# Patient Record
Sex: FEMALE | Race: WHITE | Employment: FULL TIME | URBAN - METROPOLITAN AREA
[De-identification: names, ages, dates, MRNs, and addresses within clinical notes are randomized per-mention and may not be internally consistent; named-entity substitution may affect disease eponyms.]

---

## 2017-01-17 ENCOUNTER — PATIENT MESSAGE (OUTPATIENT)
Dept: OTOLARYNGOLOGY | Facility: CLINIC | Age: 42
End: 2017-01-17

## 2017-01-17 RX ORDER — AZITHROMYCIN 250 MG/1
TABLET, FILM COATED ORAL
Qty: 1 PACKAGE | Refills: 0 | Status: SHIPPED | OUTPATIENT
Start: 2017-01-17 | End: 2017-02-22 | Stop reason: ALTCHOICE

## 2017-01-18 ENCOUNTER — TELEPHONE (OUTPATIENT)
Dept: ADMINISTRATIVE | Age: 42
End: 2017-01-18

## 2017-01-18 NOTE — TELEPHONE ENCOUNTER
Good morning,  [facility] certification recvd in [facility]. May I cover her intermittently for recurrent sinusitis? 1 to 3 days per month?   Please advise, thanks Tracie

## 2017-01-18 NOTE — TELEPHONE ENCOUNTER
From: Rosetta Segal  To: Riya Jacques MD  Sent: 1/17/2017 2:32 PM CST  Subject: Non-Urgent Medical Question    Hi Dr. Kinjal Sanches:    I think I have another sinus infection.  Are you able to prescribe me something or do I need to schedule an appointment t

## 2017-01-21 ENCOUNTER — OFFICE VISIT (OUTPATIENT)
Dept: OBGYN CLINIC | Facility: CLINIC | Age: 42
End: 2017-01-21

## 2017-01-21 VITALS
HEART RATE: 79 BPM | BODY MASS INDEX: 26.14 KG/M2 | DIASTOLIC BLOOD PRESSURE: 83 MMHG | SYSTOLIC BLOOD PRESSURE: 124 MMHG | HEIGHT: 64.5 IN | WEIGHT: 155 LBS

## 2017-01-21 DIAGNOSIS — Z01.419 ENCOUNTER FOR GYNECOLOGICAL EXAMINATION: Primary | ICD-10-CM

## 2017-01-21 PROCEDURE — 99396 PREV VISIT EST AGE 40-64: CPT | Performed by: OBSTETRICS & GYNECOLOGY

## 2017-01-21 RX ORDER — FLUCONAZOLE 150 MG/1
150 TABLET ORAL ONCE
Qty: 1 TABLET | Refills: 0 | Status: SHIPPED | OUTPATIENT
Start: 2017-01-21 | End: 2017-01-21

## 2017-01-21 RX ORDER — ACETAMINOPHEN AND CODEINE PHOSPHATE 120; 12 MG/5ML; MG/5ML
0.35 SOLUTION ORAL DAILY
Qty: 3 PACKAGE | Refills: 3 | Status: SHIPPED | OUTPATIENT
Start: 2017-01-21 | End: 2017-12-21

## 2017-01-21 NOTE — PROGRESS NOTES
Elizabeth Whatley is a 35year old female [facility] Patient's last menstrual period was 11/08/2016 (within weeks). here for annual exam.       Last seen 12/10/15. Last pap 4/2015 normal with neg HPV. Doing well with generic Seasonale.    Was diagnosed wi fluconazole (DIFLUCAN) 150 MG Oral Tab Take 1 tablet (150 mg total) by mouth once. Disp: 1 tablet Rfl: 0   azithromycin (ZITHROMAX Z-CORTNEY) 250 MG Oral Tab Take 1 by oral route every day for 5 days. 2 tablets today.  Disp: 1 Package Rfl: 0   MELOXICAM 7.5 M denies depression or anxiety. Endocrine:   denies excessive thirst or urination. Heme/Lymph:  easy bruising or bleeding.     PHYSICAL EXAM:   /83 mmHg  Pulse 79  Ht 5' 4.5\" (1.638 m)  Wt 155 lb (70.308 kg)  BMI 26.20 kg/m2  LMP 11/08/2016 (Within W Sig: Take 1 tablet (150 mg total) by mouth once.              Prudence Brown MD  1/21/2017  9:11 AM

## 2017-01-24 RX ORDER — MELOXICAM 7.5 MG/1
TABLET ORAL
Qty: 30 TABLET | Refills: 3 | Status: SHIPPED | OUTPATIENT
Start: 2017-01-24 | End: 2020-09-09

## 2017-02-22 ENCOUNTER — OFFICE VISIT (OUTPATIENT)
Dept: INTERNAL MEDICINE CLINIC | Facility: CLINIC | Age: 42
End: 2017-02-22

## 2017-02-22 VITALS
TEMPERATURE: 99 F | BODY MASS INDEX: 27.64 KG/M2 | SYSTOLIC BLOOD PRESSURE: 114 MMHG | HEART RATE: 74 BPM | DIASTOLIC BLOOD PRESSURE: 75 MMHG | RESPIRATION RATE: 18 BRPM | HEIGHT: 63 IN | WEIGHT: 156 LBS

## 2017-02-22 DIAGNOSIS — I10 ESSENTIAL HYPERTENSION: Primary | ICD-10-CM

## 2017-02-22 DIAGNOSIS — E78.5 HYPERLIPIDEMIA, UNSPECIFIED HYPERLIPIDEMIA TYPE: ICD-10-CM

## 2017-02-22 PROCEDURE — 99212 OFFICE O/P EST SF 10 MIN: CPT | Performed by: INTERNAL MEDICINE

## 2017-02-22 PROCEDURE — 99214 OFFICE O/P EST MOD 30 MIN: CPT | Performed by: INTERNAL MEDICINE

## 2017-02-22 RX ORDER — LOSARTAN POTASSIUM 50 MG/1
50 TABLET ORAL DAILY
Qty: 90 TABLET | Refills: 3 | Status: SHIPPED | OUTPATIENT
Start: 2017-02-22 | End: 2018-04-09

## 2017-02-22 NOTE — PROGRESS NOTES
HPI:    Patient ID: You Alvarado is a 35year old female.     HPI    HTN  Long standing history of hypertension  More than a year ariana  Status::::: controlled:::::::::::::::::::::::::::::y   sympotms  :        Headache no  dizziness        no fever, chills, activity change and fatigue. HENT: Negative for ear discharge, nosebleeds, postnasal drip, rhinorrhea, sinus pressure and sore throat. Eyes: Negative for pain, discharge and redness.    Respiratory: Negative for cough, chest tightness, s rub in gently and completely Disp:  Rfl:      Allergies:No Known Allergies    HISTORY:  Past Medical History   Diagnosis Date   • Psoriasis    • Conjunctival cyst 8/1/13     At 8 o'clock, OD   • Corneal ulcer- right eye 2/17/2016   • Hx of tonsillectomy 06 She is not diaphoretic. No erythema. Nursing note and vitals reviewed.            ASSESSMENT/PLAN:   Essential hypertension  (primary encounter diagnosis)  Hyperlipidemia, unspecified hyperlipidemia type  (I10) Essential hypertension  (primary encounter d

## 2017-03-13 ENCOUNTER — PATIENT MESSAGE (OUTPATIENT)
Dept: OBGYN CLINIC | Facility: CLINIC | Age: 42
End: 2017-03-13

## 2017-03-13 NOTE — TELEPHONE ENCOUNTER
From: Janet Delatorre  To: Puneet Leos MD  Sent: 3/13/2017 9:57 AM CDT  Subject: Prescription Question    Hi Dr. Jose Alberto Hodge:    Is it possible to go back to the other birth control pill I was taking?  With the mini pill I am getting my period every two weeks an

## 2017-03-18 ENCOUNTER — APPOINTMENT (OUTPATIENT)
Dept: LAB | Age: 42
End: 2017-03-18
Attending: INTERNAL MEDICINE
Payer: COMMERCIAL

## 2017-03-18 DIAGNOSIS — E78.5 HYPERLIPIDEMIA, UNSPECIFIED HYPERLIPIDEMIA TYPE: ICD-10-CM

## 2017-03-18 DIAGNOSIS — M25.50 POLYARTHRALGIA: ICD-10-CM

## 2017-03-18 DIAGNOSIS — I10 ESSENTIAL HYPERTENSION: ICD-10-CM

## 2017-03-18 DIAGNOSIS — R76.8 POSITIVE ANA (ANTINUCLEAR ANTIBODY): ICD-10-CM

## 2017-03-18 LAB
ALT SERPL-CCNC: 13 U/L (ref 14–54)
ANION GAP SERPL CALC-SCNC: 5 MMOL/L (ref 0–18)
AST SERPL-CCNC: 17 U/L (ref 15–41)
BUN SERPL-MCNC: 12 MG/DL (ref 8–20)
BUN/CREAT SERPL: 14.1 (ref 10–20)
CALCIUM SERPL-MCNC: 9.1 MG/DL (ref 8.5–10.5)
CHLORIDE SERPL-SCNC: 109 MMOL/L (ref 95–110)
CHOLEST SERPL-MCNC: 174 MG/DL (ref 110–200)
CO2 SERPL-SCNC: 25 MMOL/L (ref 22–32)
CREAT SERPL-MCNC: 0.85 MG/DL (ref 0.5–1.5)
CRP SERPL-MCNC: <0.5 MG/DL (ref 0–0.9)
ERYTHROCYTE [SEDIMENTATION RATE] IN BLOOD: 9 MM/HR (ref 0–20)
GLUCOSE SERPL-MCNC: 98 MG/DL (ref 70–99)
HDLC SERPL-MCNC: 52 MG/DL
LDLC SERPL CALC-MCNC: 114 MG/DL (ref 0–99)
NONHDLC SERPL-MCNC: 122 MG/DL
OSMOLALITY UR CALC.SUM OF ELEC: 288 MOSM/KG (ref 275–295)
POTASSIUM SERPL-SCNC: 4.7 MMOL/L (ref 3.3–5.1)
SODIUM SERPL-SCNC: 139 MMOL/L (ref 136–144)
TRIGL SERPL-MCNC: 40 MG/DL (ref 1–149)

## 2017-03-18 PROCEDURE — 85652 RBC SED RATE AUTOMATED: CPT

## 2017-03-18 PROCEDURE — 86235 NUCLEAR ANTIGEN ANTIBODY: CPT

## 2017-03-18 PROCEDURE — 84450 TRANSFERASE (AST) (SGOT): CPT

## 2017-03-18 PROCEDURE — 80061 LIPID PANEL: CPT

## 2017-03-18 PROCEDURE — 84460 ALANINE AMINO (ALT) (SGPT): CPT

## 2017-03-18 PROCEDURE — 80048 BASIC METABOLIC PNL TOTAL CA: CPT

## 2017-03-18 PROCEDURE — 86140 C-REACTIVE PROTEIN: CPT

## 2017-03-18 PROCEDURE — 36415 COLL VENOUS BLD VENIPUNCTURE: CPT

## 2017-03-22 LAB
ENA SS-A AB SER QL IA: NEGATIVE
ENA SS-B AB SER QL IA: NEGATIVE

## 2017-06-13 ENCOUNTER — OFFICE VISIT (OUTPATIENT)
Dept: OTOLARYNGOLOGY | Facility: CLINIC | Age: 42
End: 2017-06-13

## 2017-06-13 VITALS
WEIGHT: 150 LBS | BODY MASS INDEX: 26.58 KG/M2 | SYSTOLIC BLOOD PRESSURE: 112 MMHG | DIASTOLIC BLOOD PRESSURE: 80 MMHG | TEMPERATURE: 98 F | HEIGHT: 63 IN

## 2017-06-13 DIAGNOSIS — G47.33 OBSTRUCTIVE SLEEP APNEA SYNDROME: ICD-10-CM

## 2017-06-13 DIAGNOSIS — J30.9 ALLERGIC RHINITIS, UNSPECIFIED ALLERGIC RHINITIS TRIGGER, UNSPECIFIED RHINITIS SEASONALITY: Primary | ICD-10-CM

## 2017-06-13 PROCEDURE — 99212 OFFICE O/P EST SF 10 MIN: CPT | Performed by: OTOLARYNGOLOGY

## 2017-06-13 PROCEDURE — 99213 OFFICE O/P EST LOW 20 MIN: CPT | Performed by: OTOLARYNGOLOGY

## 2017-06-13 RX ORDER — LEVOCETIRIZINE DIHYDROCHLORIDE 5 MG/1
5 TABLET, FILM COATED ORAL EVERY EVENING
Qty: 30 TABLET | Refills: 5 | Status: SHIPPED | OUTPATIENT
Start: 2017-06-13 | End: 2018-02-01

## 2017-06-13 RX ORDER — TRIAMCINOLONE ACETONIDE 55 UG/1
2 SPRAY, METERED NASAL DAILY
Qty: 1 INHALER | Refills: 5 | Status: SHIPPED | OUTPATIENT
Start: 2017-06-13 | End: 2018-04-06

## 2017-06-13 RX ORDER — NORETHINDRONE 0.35 MG/1
TABLET ORAL
Refills: 3 | COMMUNITY
Start: 2017-04-13 | End: 2018-02-01

## 2017-06-14 ENCOUNTER — TELEPHONE (OUTPATIENT)
Dept: OTOLARYNGOLOGY | Facility: CLINIC | Age: 42
End: 2017-06-14

## 2017-06-14 NOTE — TELEPHONE ENCOUNTER
Called pt's [phone], spoke with Morris Dillon, a prior authorization is not required for sleep study, reference number [id number]. Spoke with pt and informed that she can schedule sleep study at this time.  It is the patient's responsibility to verify bene

## 2017-06-14 NOTE — PROGRESS NOTES
Melania Mcelroy is a 35year old female. Patient presents with:  Throat Problem: post nasal drip for 2 weeks, c/o dry throat    HPI:   PND and light cough with throat irritation. Snoring a lot and sleeps very poorly.  Tired all the time    Current Outpa 0.0 oz/week       0 Standard drinks or equivalent per week       Comment: rarely       REVIEW OF SYSTEMS:   GENERAL HEALTH: feels well otherwise  GENERAL : denies fever, chills, sweats, weight loss, weight gain  SKIN: denies any unusual skin lesions o

## 2017-07-17 ENCOUNTER — OFFICE VISIT (OUTPATIENT)
Dept: DERMATOLOGY CLINIC | Facility: CLINIC | Age: 42
End: 2017-07-17

## 2017-07-17 ENCOUNTER — APPOINTMENT (OUTPATIENT)
Dept: LAB | Facility: HOSPITAL | Age: 42
End: 2017-07-17
Attending: DERMATOLOGY
Payer: COMMERCIAL

## 2017-07-17 DIAGNOSIS — L40.9 PSORIASIS: ICD-10-CM

## 2017-07-17 DIAGNOSIS — D23.9 BENIGN NEOPLASM OF SKIN, UNSPECIFIED LOCATION: ICD-10-CM

## 2017-07-17 DIAGNOSIS — L40.8 OTHER PSORIASIS: ICD-10-CM

## 2017-07-17 DIAGNOSIS — D48.5 NEOPLASM OF UNCERTAIN BEHAVIOR OF SKIN: Primary | ICD-10-CM

## 2017-07-17 DIAGNOSIS — D22.9 MULTIPLE NEVI: ICD-10-CM

## 2017-07-17 PROCEDURE — 99213 OFFICE O/P EST LOW 20 MIN: CPT | Performed by: DERMATOLOGY

## 2017-07-17 PROCEDURE — 11100 BIOPSY OF SKIN LESION: CPT | Performed by: DERMATOLOGY

## 2017-07-17 PROCEDURE — 99212 OFFICE O/P EST SF 10 MIN: CPT | Performed by: DERMATOLOGY

## 2017-07-17 PROCEDURE — 88305 TISSUE EXAM BY PATHOLOGIST: CPT

## 2017-07-20 NOTE — PROGRESS NOTES
The pathology report from last visit showed   right upper abdomen  Compound nevus . Please log in test results, send biopsy results letter. Pt to rtc 1 year or prn.

## 2017-07-21 NOTE — PROGRESS NOTES
Bx letter sent via [address] [place] [address] [address]. Result logged & checked off in path book.

## 2017-07-31 ENCOUNTER — TELEPHONE (OUTPATIENT)
Dept: DERMATOLOGY CLINIC | Facility: CLINIC | Age: 42
End: 2017-07-31

## 2017-07-31 NOTE — PROGRESS NOTES
Burgess Henley is a 35year old female. HPI:     CC:  Patient presents with:  Psoriasis: LOV: 10/24/16 Pt presents for f/u on psoriasis. States feels it is getting worse. Allergies:  Review of patient's allergies indicates no known allergies. every day a thin film to the affected skin areas Disp: 80 g Rfl: 3   Fluticasone Propionate 0.05 % External Cream Apply 1 Application topically 2 (two) times daily.  Use as directed 2 times daily for psoriasis on face Disp: 30 g Rfl: 2   Azelastine HCl (AST visit.    HPI:    Patient presents with:  Psoriasis: LOV: 10/24/16 Pt presents for f/u on psoriasis. States feels it is getting worse. Patient presents with concerns above. Patient has been in their usual state of health.   History, medications, all further plans pending pathology      Benign-appearing nevus on back observe. Psoriasis. Plaque-type.  >10% body surface involvement. Including scalp. Elbows knees more persistent scattered lesions on trunk and extremities. Has been using topicals.

## 2017-08-01 NOTE — TELEPHONE ENCOUNTER
Gurpreet Parks form in [address] box--please fax -please attach additional information if needed

## 2017-08-02 NOTE — TELEPHONE ENCOUNTER
Pt is not eligible for [place] [employer] free offer program due to cannot verify insurance coverage.  PLaced fax in rn inbox

## 2017-08-04 NOTE — TELEPHONE ENCOUNTER
Otezla not covered.  Please call [phone] ref # [id number] mon-Friday 9am-530pm EST -- will fax over form

## 2017-08-07 ENCOUNTER — TELEPHONE (OUTPATIENT)
Dept: DERMATOLOGY CLINIC | Facility: CLINIC | Age: 42
End: 2017-08-07

## 2017-08-10 ENCOUNTER — TELEPHONE (OUTPATIENT)
Dept: DERMATOLOGY CLINIC | Facility: CLINIC | Age: 42
End: 2017-08-10

## 2017-08-10 NOTE — TELEPHONE ENCOUNTER
pls call [phone] [facility] has some questions regarding the otezla pls advise reference # [id number]

## 2017-08-10 NOTE — TELEPHONE ENCOUNTER
[facility] pharmacist called, states since PA has not gone through for HÜTTEN, asked if HÜTTEN Rx should be cancelled. Pharmacist informed to cancel Rx for HÜTTEN, has been sent to a mail order pharmacy.   Pharmacist notes that HÜTTEN Rx is cancelled at [facility]

## 2017-08-14 NOTE — TELEPHONE ENCOUNTER
Spoke with [facility] pharmacy this a.m. PA for HÜTTEN completed over the telephone. PA for HÜTTEN approved though 12/2017. [facility] will send pt starter pack and Rx as prescribed by [employer].

## 2017-08-15 ENCOUNTER — TELEPHONE (OUTPATIENT)
Dept: DERMATOLOGY CLINIC | Facility: CLINIC | Age: 42
End: 2017-08-15

## 2017-08-16 NOTE — TELEPHONE ENCOUNTER
[facility] contacted regarding step therapy informed to please disregard. [facility] states that PA for Otezla starter pack and maintenance dose was approved for $15 - Fax conformation received, approved from 7/15/2017 through 12/12/2017.  [employer]

## 2017-08-31 ENCOUNTER — TELEPHONE (OUTPATIENT)
Dept: DERMATOLOGY CLINIC | Facility: CLINIC | Age: 42
End: 2017-08-31

## 2017-09-01 NOTE — TELEPHONE ENCOUNTER
[facility] [employer] faxed Adverse reaction form. Pt was sent the  Otezla maintenance dose before the starter pack.  Pt states that she contacted [employer] and reported that she was having tingling sensation to both forearms and slight di

## 2017-09-01 NOTE — TELEPHONE ENCOUNTER
Information received from [address] [place] [address] regarding adverse reaction to [employer]. Patient c/o diarrhea, nausea, and  Joint pain.  Paperwork in [employer] box partially filled out

## 2017-09-01 NOTE — TELEPHONE ENCOUNTER
Noted.  Glad symptoms resolved. Will let our rep know about the confusion with the starter and maintenance.    Await response

## 2017-09-12 NOTE — TELEPHONE ENCOUNTER
Ange has received information from [employer] regarding adverse events.  Placed fax in rn inbox

## 2017-12-07 ENCOUNTER — TELEPHONE (OUTPATIENT)
Dept: OTOLARYNGOLOGY | Facility: CLINIC | Age: 42
End: 2017-12-07

## 2017-12-07 NOTE — TELEPHONE ENCOUNTER
Called pt's insurance [phone], spoke with Sandra Salazar, a prior authorization is not needed for sleep study, reference number [id number]. Left message for pt to call back to inform that she can schedule sleep study at this time.  It is the patient's responsibilit

## 2017-12-22 ENCOUNTER — PATIENT MESSAGE (OUTPATIENT)
Dept: OBGYN CLINIC | Facility: CLINIC | Age: 42
End: 2017-12-22

## 2017-12-22 DIAGNOSIS — Z12.39 SCREENING FOR MALIGNANT NEOPLASM OF BREAST: Primary | ICD-10-CM

## 2017-12-22 RX ORDER — NORETHINDRONE 0.35 MG/1
TABLET ORAL
Qty: 84 TABLET | Refills: 0 | Status: SHIPPED | OUTPATIENT
Start: 2017-12-22 | End: 2018-02-01

## 2017-12-22 NOTE — TELEPHONE ENCOUNTER
Pt looking for a mammogram order before her appt on 2/1/18  with RADHA. Last mammogram 12/17/16 and normal.  Sent to [facility] Ramon [facility] for recs.

## 2017-12-22 NOTE — TELEPHONE ENCOUNTER
Rx sent fr Elda Bloom thru [facility]. Sent for 84 tablets. Pt next appt with RADHA is 2/1/18. Last Annual with RADHA 1-21-17.

## 2018-01-13 ENCOUNTER — HOSPITAL ENCOUNTER (OUTPATIENT)
Dept: MAMMOGRAPHY | Age: 43
Discharge: HOME | End: 2018-01-13
Attending: OBSTETRICS & GYNECOLOGY
Payer: COMMERCIAL

## 2018-01-13 DIAGNOSIS — Z12.39 SCREENING FOR MALIGNANT NEOPLASM OF BREAST: ICD-10-CM

## 2018-01-13 PROCEDURE — 77067 SCR MAMMO BI INCL CAD: CPT | Performed by: OBSTETRICS & GYNECOLOGY

## 2018-02-01 ENCOUNTER — OFFICE VISIT (OUTPATIENT)
Dept: OBGYN CLINIC | Facility: CLINIC | Age: 43
End: 2018-02-01

## 2018-02-01 VITALS
HEART RATE: 64 BPM | BODY MASS INDEX: 26.58 KG/M2 | WEIGHT: 150 LBS | DIASTOLIC BLOOD PRESSURE: 73 MMHG | HEIGHT: 63 IN | SYSTOLIC BLOOD PRESSURE: 111 MMHG

## 2018-02-01 DIAGNOSIS — Z01.419 ENCOUNTER FOR GYNECOLOGICAL EXAMINATION: Primary | ICD-10-CM

## 2018-02-01 DIAGNOSIS — Z12.4 SCREENING FOR MALIGNANT NEOPLASM OF CERVIX: ICD-10-CM

## 2018-02-01 PROCEDURE — 99396 PREV VISIT EST AGE 40-64: CPT | Performed by: OBSTETRICS & GYNECOLOGY

## 2018-02-01 RX ORDER — APREMILAST 30 MG/1
TABLET, FILM COATED ORAL
COMMUNITY
Start: 2018-01-29 | End: 2018-07-23

## 2018-02-01 RX ORDER — NORETHINDRONE 0.35 MG/1
TABLET ORAL
Qty: 3 PACKAGE | Refills: 3 | Status: SHIPPED | OUTPATIENT
Start: 2018-02-01 | End: 2018-04-20

## 2018-02-01 NOTE — PROGRESS NOTES
Tamiko Wells is a 38year old female [facility] Patient's last menstrual period was 01/09/2018. here for annual exam.       Last seen 1/21/17. Last pap 4/2015 normal with neg HPV. Menses q month.   Since being on Nor QD, flow lasting 7 days and heavy route daily.  Disp: 1 Inhaler Rfl: 5   Clobetasol Propionate 0.05 % External Foam Use bid Disp: 100 g Rfl: 6   Fluocinolone Acetonide (DERMOTIC) 0.01 % Otic Oil Use qhs to ears for psoriasis Disp: 20 mL Rfl: 6   triamcinolone acetonide 0.1 % External Cream nipple discharge, nipple retraction or skin changes  Abdomen:  soft, nontender, nondistended, no masses  Psychiatric:  Oriented to time, place, person and situation.  Appropriate mood and affect    Pelvic Exam:  External Genitalia: normal appearance, hair d

## 2018-02-02 LAB — HPV I/H RISK 1 DNA SPEC QL NAA+PROBE: NEGATIVE

## 2018-02-06 ENCOUNTER — TELEPHONE (OUTPATIENT)
Dept: OBGYN CLINIC | Facility: CLINIC | Age: 43
End: 2018-02-06

## 2018-02-06 ENCOUNTER — NURSE ONLY (OUTPATIENT)
Dept: OBGYN CLINIC | Facility: CLINIC | Age: 43
End: 2018-02-06

## 2018-02-06 VITALS
HEART RATE: 66 BPM | BODY MASS INDEX: 27 KG/M2 | DIASTOLIC BLOOD PRESSURE: 84 MMHG | SYSTOLIC BLOOD PRESSURE: 130 MMHG | WEIGHT: 150 LBS

## 2018-02-06 DIAGNOSIS — Z30.42 ENCOUNTER FOR SURVEILLANCE OF INJECTABLE CONTRACEPTIVE: Primary | ICD-10-CM

## 2018-02-06 PROCEDURE — 96372 THER/PROPH/DIAG INJ SC/IM: CPT | Performed by: OBSTETRICS & GYNECOLOGY

## 2018-02-06 RX ORDER — MEDROXYPROGESTERONE ACETATE 150 MG/ML
150 INJECTION, SUSPENSION INTRAMUSCULAR
Status: COMPLETED | OUTPATIENT
Start: 2018-02-06 | End: 2018-07-18

## 2018-02-06 RX ADMIN — MEDROXYPROGESTERONE ACETATE 150 MG: 150 INJECTION, SUSPENSION INTRAMUSCULAR at 15:26:00

## 2018-02-06 NOTE — TELEPHONE ENCOUNTER
Message to Zhou Ramon [phone] to please advise message below--OK to schedule depo injection for this week since pt is currently on menses?

## 2018-02-06 NOTE — TELEPHONE ENCOUNTER
Pt states at her last appt she spoke with JOHANNE about switching BC to the depo. Pt also states yesterday was the first day of her menses and wondering what the next steps would be.  Please advise

## 2018-02-06 NOTE — PROGRESS NOTES
Patient here today for 1st Depo Provera injection per JLK. Patient's last Annual was February 1, 2018 Injection given on the Right, upper outer Gluteus side patient tolerated well.  Patient given written return instruction for April 24 - May 8 2018  for ne

## 2018-03-05 ENCOUNTER — OFFICE VISIT (OUTPATIENT)
Dept: SLEEP CENTER | Age: 43
End: 2018-03-05
Attending: OTOLARYNGOLOGY
Payer: COMMERCIAL

## 2018-03-05 DIAGNOSIS — Z76.89 SLEEP CONCERN: Primary | ICD-10-CM

## 2018-03-05 PROCEDURE — 95810 POLYSOM 6/> YRS 4/> PARAM: CPT

## 2018-03-07 NOTE — PROCEDURES
[address] [facility]  Accredited by the [employer] of [employer] (AASM)    PATIENT'S NAME: Lex Black   ATTENDING PHYSICIAN: Shubham Arora MD   REFERRING PHYSICIAN: Magalys Plasencia.  Bernabe Arora MD   PATIENT ACCOUNT #: [id number] LOCATION: Sle events per hour.   There were 24 periodic limb movements for a periodic limb movement index of 6.4 events per hour, of which 0.5 per hour were associated with arousal.  Lowest desaturation was to 94%, the average heart rate was 35 beats per minute, and ther

## 2018-03-08 ENCOUNTER — TELEPHONE (OUTPATIENT)
Dept: OTHER | Age: 43
End: 2018-03-08

## 2018-03-08 ENCOUNTER — TELEPHONE (OUTPATIENT)
Dept: OTOLARYNGOLOGY | Facility: CLINIC | Age: 43
End: 2018-03-08

## 2018-03-08 NOTE — TELEPHONE ENCOUNTER
----- Message from Uma Talavera RN sent at 3/8/2018  7:43 AM CST -----      ----- Message -----  From: Britta Nissen, MD  Sent: 3/7/2018  11:59 PM  To: Teresita Gomez Lpgautam/Dangelo    Send letter and copy of test result.   Sleep study is normal        Letter and Co

## 2018-03-12 NOTE — TELEPHONE ENCOUNTER
Notes Recorded by Christine Loza RN on 3/12/2018 at 3:36 PM CDT  Pt informed of results per .  Pt verbalized understanding.

## 2018-03-17 RX ORDER — ACETAMINOPHEN AND CODEINE PHOSPHATE 120; 12 MG/5ML; MG/5ML
SOLUTION ORAL
Qty: 84 TABLET | Refills: 0 | OUTPATIENT
Start: 2018-03-17

## 2018-04-10 RX ORDER — TRIAMCINOLONE ACETONIDE 55 UG/1
2 SPRAY, METERED NASAL DAILY
Qty: 1 INHALER | Refills: 5 | Status: SHIPPED
Start: 2018-04-10 | End: 2018-12-01

## 2018-04-13 RX ORDER — LOSARTAN POTASSIUM 50 MG/1
TABLET ORAL
Qty: 30 TABLET | Refills: 0 | Status: SHIPPED | OUTPATIENT
Start: 2018-04-13 | End: 2018-11-27

## 2018-04-15 ENCOUNTER — LAB ENCOUNTER (OUTPATIENT)
Dept: LAB | Facility: HOSPITAL | Age: 43
End: 2018-04-15
Attending: INTERNAL MEDICINE
Payer: COMMERCIAL

## 2018-04-15 DIAGNOSIS — I10 ESSENTIAL HYPERTENSION: ICD-10-CM

## 2018-04-15 DIAGNOSIS — Z00.00 ROUTINE GENERAL MEDICAL EXAMINATION AT A HEALTH CARE FACILITY: ICD-10-CM

## 2018-04-15 PROCEDURE — 87086 URINE CULTURE/COLONY COUNT: CPT

## 2018-04-15 PROCEDURE — 85027 COMPLETE CBC AUTOMATED: CPT

## 2018-04-15 PROCEDURE — 81015 MICROSCOPIC EXAM OF URINE: CPT

## 2018-04-15 PROCEDURE — 36415 COLL VENOUS BLD VENIPUNCTURE: CPT

## 2018-04-15 PROCEDURE — 87147 CULTURE TYPE IMMUNOLOGIC: CPT

## 2018-04-15 PROCEDURE — 80061 LIPID PANEL: CPT

## 2018-04-15 PROCEDURE — 84439 ASSAY OF FREE THYROXINE: CPT

## 2018-04-15 PROCEDURE — 84443 ASSAY THYROID STIM HORMONE: CPT

## 2018-04-15 PROCEDURE — 80053 COMPREHEN METABOLIC PANEL: CPT

## 2018-04-18 ENCOUNTER — TELEPHONE (OUTPATIENT)
Dept: INTERNAL MEDICINE CLINIC | Facility: CLINIC | Age: 43
End: 2018-04-18

## 2018-04-18 NOTE — TELEPHONE ENCOUNTER
Pt returned call, reviewed lab results with pt. Pt denies Elda Kumar, but having left lower abdominal pain since Jan. 2018. Pain is dull,intermittent and nonradiating with pain scale ranging 2 to 3. Stated \"I have high pain tolerance. \"  Pt mentioned pain to

## 2018-04-18 NOTE — TELEPHONE ENCOUNTER
How is she      Markedly abnormal uinalysis    Blood and WBC noted  Advise follow up with urology  Culture strep no need to treat    Any UTI symptoms?     Other albs are within normal  Component      Latest Ref Rng & Units 4/15/2018   Glucose      70 - 99 m

## 2018-04-20 ENCOUNTER — APPOINTMENT (OUTPATIENT)
Dept: LAB | Age: 43
End: 2018-04-20
Attending: INTERNAL MEDICINE
Payer: COMMERCIAL

## 2018-04-20 ENCOUNTER — OFFICE VISIT (OUTPATIENT)
Dept: INTERNAL MEDICINE CLINIC | Facility: CLINIC | Age: 43
End: 2018-04-20

## 2018-04-20 ENCOUNTER — HOSPITAL ENCOUNTER (OUTPATIENT)
Dept: ULTRASOUND IMAGING | Facility: HOSPITAL | Age: 43
Discharge: HOME | End: 2018-04-20
Attending: INTERNAL MEDICINE
Payer: COMMERCIAL

## 2018-04-20 VITALS
HEIGHT: 63 IN | WEIGHT: 148 LBS | DIASTOLIC BLOOD PRESSURE: 72 MMHG | TEMPERATURE: 99 F | HEART RATE: 87 BPM | BODY MASS INDEX: 26.22 KG/M2 | SYSTOLIC BLOOD PRESSURE: 104 MMHG

## 2018-04-20 DIAGNOSIS — R31.9 HEMATURIA, UNSPECIFIED TYPE: ICD-10-CM

## 2018-04-20 DIAGNOSIS — R10.32 LLQ PAIN: ICD-10-CM

## 2018-04-20 DIAGNOSIS — Z80.0 FAMILY HISTORY OF COLON CANCER REQUIRING SCREENING COLONOSCOPY: ICD-10-CM

## 2018-04-20 DIAGNOSIS — Z00.00 ROUTINE GENERAL MEDICAL EXAMINATION AT A HEALTH CARE FACILITY: Primary | ICD-10-CM

## 2018-04-20 PROCEDURE — 99212 OFFICE O/P EST SF 10 MIN: CPT | Performed by: INTERNAL MEDICINE

## 2018-04-20 PROCEDURE — 81001 URINALYSIS AUTO W/SCOPE: CPT

## 2018-04-20 PROCEDURE — 87147 CULTURE TYPE IMMUNOLOGIC: CPT

## 2018-04-20 PROCEDURE — 87086 URINE CULTURE/COLONY COUNT: CPT

## 2018-04-20 PROCEDURE — 99396 PREV VISIT EST AGE 40-64: CPT | Performed by: INTERNAL MEDICINE

## 2018-04-20 PROCEDURE — 76770 US EXAM ABDO BACK WALL COMP: CPT | Performed by: INTERNAL MEDICINE

## 2018-04-20 PROCEDURE — 99213 OFFICE O/P EST LOW 20 MIN: CPT | Performed by: INTERNAL MEDICINE

## 2018-04-20 RX ORDER — LOSARTAN POTASSIUM 50 MG/1
50 TABLET ORAL DAILY
Qty: 90 TABLET | Refills: 1 | Status: SHIPPED | OUTPATIENT
Start: 2018-04-20 | End: 2018-08-14

## 2018-04-20 NOTE — PROGRESS NOTES
HPI:    Patient ID: Zack Rojas is a 38year old female.     HPI    sched for physical   Next Wed  Would like phsyical exam and address acute problesm    Abnormal urinalysis with morscopic hematuria    Complaint of  Random pain once in a while left l light-headedness and headaches. Hematological: Negative for adenopathy. Does not bruise/bleed easily. Psychiatric/Behavioral: Negative for agitation and behavioral problems.            Current Outpatient Prescriptions:  Losartan Potassium 50 MG Oral Tab death   • Hypertension Mother    • Diabetes Maternal Grandmother    • Glaucoma Neg    • Macular degeneration Neg       Social History: Smoking status: Never Smoker                                                              Smokeless tobacco: Never Used 12 (L)   ALKALINE PHOSPHATASE      32 - 100 U/L 50   Total Bilirubin      0.3 - 1.2 mg/dL 0.6   TOTAL PROTEIN      5.9 - 8.4 g/dL 7.1   Albumin      3.5 - 4.8 g/dL 3.8   GLOBULIN, TOTAL      2.5 - 3.7 g/dL 3.3   A/G RATIO      1.0 - 2.0 1.2   ANION GAP HTN  /72 (BP Location: Right arm, Patient Position: Sitting, Cuff Size: adult)   Pulse 87   Temp 98.6 °F (37 °C) (Oral)   Ht 5' 3\" (1.6 m)   Wt 148 lb (67.1 kg)   LMP 04/06/2018 (Approximate)   BMI 26.22 kg/m²   Controlled   Refill med    [facility]

## 2018-04-21 NOTE — TELEPHONE ENCOUNTER
Office Visit  Open      4/20/2018  Valeri Chawla MD   Internal Medicine   Hematuria, unspecified type +3 more   Dx   Lab Results    Reason for Visit

## 2018-04-23 ENCOUNTER — TELEPHONE (OUTPATIENT)
Dept: OBGYN CLINIC | Facility: CLINIC | Age: 43
End: 2018-04-23

## 2018-04-23 NOTE — TELEPHONE ENCOUNTER
Last annual: 2/1/2018   Last depo injection: 2/6/18 with recs to return April 24 - May 8 2018  for next injection. Pt accepted appt for next injection on 4/28.

## 2018-04-28 ENCOUNTER — NURSE ONLY (OUTPATIENT)
Dept: OBGYN CLINIC | Facility: CLINIC | Age: 43
End: 2018-04-28

## 2018-04-28 VITALS
WEIGHT: 148 LBS | BODY MASS INDEX: 26 KG/M2 | HEART RATE: 76 BPM | SYSTOLIC BLOOD PRESSURE: 102 MMHG | DIASTOLIC BLOOD PRESSURE: 71 MMHG

## 2018-04-28 DIAGNOSIS — Z30.42 ENCOUNTER FOR SURVEILLANCE OF INJECTABLE CONTRACEPTIVE: Primary | ICD-10-CM

## 2018-04-28 PROCEDURE — 96372 THER/PROPH/DIAG INJ SC/IM: CPT | Performed by: OBSTETRICS & GYNECOLOGY

## 2018-04-28 RX ADMIN — MEDROXYPROGESTERONE ACETATE 150 MG: 150 INJECTION, SUSPENSION INTRAMUSCULAR at 08:46:00

## 2018-04-28 NOTE — PROGRESS NOTES
Patient here today for Depo Provera injection. Patient's last Annual was February 1, 2018 Injection given on the Left, upper outer Gluteus side patient tolerated well.  Patient given written return instruction for July 14- July 28, 2018  for next injection

## 2018-07-10 ENCOUNTER — TELEPHONE (OUTPATIENT)
Dept: PEDIATRICS CLINIC | Facility: CLINIC | Age: 43
End: 2018-07-10

## 2018-07-18 ENCOUNTER — NURSE ONLY (OUTPATIENT)
Dept: OBGYN CLINIC | Facility: CLINIC | Age: 43
End: 2018-07-18
Payer: COMMERCIAL

## 2018-07-18 VITALS
DIASTOLIC BLOOD PRESSURE: 79 MMHG | BODY MASS INDEX: 26 KG/M2 | SYSTOLIC BLOOD PRESSURE: 120 MMHG | WEIGHT: 148 LBS | HEART RATE: 59 BPM

## 2018-07-18 DIAGNOSIS — Z30.42 ENCOUNTER FOR SURVEILLANCE OF INJECTABLE CONTRACEPTIVE: Primary | ICD-10-CM

## 2018-07-18 PROCEDURE — 96372 THER/PROPH/DIAG INJ SC/IM: CPT | Performed by: OBSTETRICS & GYNECOLOGY

## 2018-07-18 RX ADMIN — MEDROXYPROGESTERONE ACETATE 150 MG: 150 INJECTION, SUSPENSION INTRAMUSCULAR at 16:03:00

## 2018-07-18 NOTE — PROGRESS NOTES
PT WAS IN OFFICE FOR DEPO INJECTION. PT'S LAST ANNUAL WAS 2-1-18. INJECTION WAS ON THE RIGHT UPPER OUTER GLUTEUS. WRITTEN INSTRUCTIONS WERE GIVEN FOR NEXT INJECTION OCT 3-17, 2018. PT VERBALIZED UNDERSTANDING.

## 2018-07-24 RX ORDER — APREMILAST 30 MG/1
TABLET, FILM COATED ORAL
Qty: 60 TABLET | Refills: 2 | Status: SHIPPED | OUTPATIENT
Start: 2018-07-24 | End: 2018-10-19

## 2018-08-14 ENCOUNTER — TELEPHONE (OUTPATIENT)
Dept: GASTROENTEROLOGY | Facility: CLINIC | Age: 43
End: 2018-08-14

## 2018-08-14 ENCOUNTER — OFFICE VISIT (OUTPATIENT)
Dept: GASTROENTEROLOGY | Facility: CLINIC | Age: 43
End: 2018-08-14
Payer: COMMERCIAL

## 2018-08-14 ENCOUNTER — OFFICE VISIT (OUTPATIENT)
Dept: ALLERGY | Facility: CLINIC | Age: 43
End: 2018-08-14
Payer: COMMERCIAL

## 2018-08-14 ENCOUNTER — NURSE ONLY (OUTPATIENT)
Dept: ALLERGY | Facility: CLINIC | Age: 43
End: 2018-08-14
Payer: COMMERCIAL

## 2018-08-14 VITALS
TEMPERATURE: 99 F | WEIGHT: 149.38 LBS | OXYGEN SATURATION: 98 % | HEIGHT: 63.5 IN | BODY MASS INDEX: 26.14 KG/M2 | DIASTOLIC BLOOD PRESSURE: 85 MMHG | RESPIRATION RATE: 16 BRPM | SYSTOLIC BLOOD PRESSURE: 128 MMHG | HEART RATE: 71 BPM

## 2018-08-14 VITALS
SYSTOLIC BLOOD PRESSURE: 110 MMHG | BODY MASS INDEX: 26.4 KG/M2 | HEART RATE: 81 BPM | HEIGHT: 63 IN | WEIGHT: 149 LBS | DIASTOLIC BLOOD PRESSURE: 73 MMHG

## 2018-08-14 DIAGNOSIS — Z80.0 FAMILY HISTORY OF COLON CANCER: Primary | ICD-10-CM

## 2018-08-14 DIAGNOSIS — J30.9 ALLERGIC RHINITIS, UNSPECIFIED SEASONALITY, UNSPECIFIED TRIGGER: ICD-10-CM

## 2018-08-14 DIAGNOSIS — Z01.818 PREOPERATIVE CLEARANCE: ICD-10-CM

## 2018-08-14 DIAGNOSIS — J30.89 ENVIRONMENTAL AND SEASONAL ALLERGIES: ICD-10-CM

## 2018-08-14 DIAGNOSIS — K62.5 ANAL BLEEDING: ICD-10-CM

## 2018-08-14 DIAGNOSIS — J32.9 CHRONIC SINUSITIS, UNSPECIFIED LOCATION: Primary | ICD-10-CM

## 2018-08-14 PROCEDURE — 99243 OFF/OP CNSLTJ NEW/EST LOW 30: CPT | Performed by: INTERNAL MEDICINE

## 2018-08-14 PROCEDURE — 99212 OFFICE O/P EST SF 10 MIN: CPT | Performed by: ALLERGY & IMMUNOLOGY

## 2018-08-14 PROCEDURE — 99244 OFF/OP CNSLTJ NEW/EST MOD 40: CPT | Performed by: ALLERGY & IMMUNOLOGY

## 2018-08-14 PROCEDURE — 95024 IQ TESTS W/ALLERGENIC XTRCS: CPT | Performed by: ALLERGY & IMMUNOLOGY

## 2018-08-14 PROCEDURE — 95004 PERQ TESTS W/ALRGNC XTRCS: CPT | Performed by: ALLERGY & IMMUNOLOGY

## 2018-08-14 RX ORDER — MONTELUKAST SODIUM 10 MG/1
10 TABLET ORAL NIGHTLY
Qty: 30 TABLET | Refills: 0 | Status: SHIPPED | OUTPATIENT
Start: 2018-08-14 | End: 2018-09-13

## 2018-08-14 NOTE — H&P
History of present illness: This is a 43-year-old female patient of Dr. Shen Son referred for possible colonoscopy.   The patient has a family history of colon cancer in her father who was diagnosed with stage IV colon cancer in his mid 61s and later  limited to the risks of bleeding, infection, pain, death, as well as the risks of anesthesia and perforation all leading to prolonged hospitalization, surgical intervention, or even death.  I also specifically mentioned the miss rate of colonoscopy of 5-10%

## 2018-08-14 NOTE — PATIENT INSTRUCTIONS
1. Schedule colonoscopy with split dose miralax preparation and MAC at [facility] or IV sedation at [address] [place] [address]

## 2018-08-14 NOTE — PROGRESS NOTES
Melania Mcelroy is a 38year old female. HPI:   Patient presents with: Allergies: New pt. Pt presents with concern of seasonal/environmental allergies, c/o nasal congestion and ear congestion. Pt c/o sinus pressure and pain when symptoms arrise.   P Smoking status: Never Smoker                                                              Smokeless tobacco: Never Used                      Alcohol use:  No               Comment: rarely       Medications (Active prior to today's visit):    Current Outpati Negative for pruritus and rash  Musculoskeletal:  Negative for joint symptoms  Neurological:  Negative for dizziness, seizures  Psychiatric:  Negative for inappropriate interaction and psychiatric symptoms  Respiratory:  Negative for cough, dyspnea and whe daily basis 2 sprays per nostril once a day rather than as needed  Consider adding Singulair, montelukast if refractory to above recommendations  Sinus rinses         Orders This Visit:  No orders of the defined types were placed in this encounter.       Me

## 2018-08-14 NOTE — TELEPHONE ENCOUNTER
Scheduled for:  Colon  Provider Name: PHU  Date:  9-11-18  Location:  [facility]  Sedation:  MAC  Time:  2:30pm, arrival 1:30pm  Prep: Miralax/Gatorade  Meds/Allergies Reconciled?:  yes  Diagnosis with codes:  [address] [facility] Z80.0  Was patient informed to call insurance w

## 2018-08-14 NOTE — PROGRESS NOTES
HPI:    Patient ID: Jesus Ro is a 38year old female. HPI    Review of Systems   Constitutional: Negative for activity change, appetite change, chills, diaphoresis, fatigue, fever and unexpected weight change.    HENT: Negative for congestion, Rfl: 6   triamcinolone acetonide 0.1 % External Cream Apply topically 2 (two) times daily.  apply by TOPICAL route 2 times every day a thin film to the affected skin areas Disp: 80 g Rfl: 3   Azelastine HCl (ASTELIN) 0.1 % Nasal Solution  Disp:  Rfl: 6   Em No erythema. No pallor. Psychiatric: She has a normal mood and affect. Her behavior is normal. Judgment and thought content normal.   Nursing note and vitals reviewed.              ASSESSMENT/PLAN:   Family history of colon cancer  (primary encounter diag

## 2018-08-14 NOTE — PATIENT INSTRUCTIONS
Recs: Handouts on allergic rhinitis and nonallergic rhinitis provided and reviewed  Handouts on chronic sinusitis provided and reviewed  Continue with Claritin  Advised to use Nasacort on a daily basis 2 sprays per nostril once a day rather than as neede

## 2018-09-11 ENCOUNTER — ANESTHESIA EVENT (OUTPATIENT)
Dept: ENDOSCOPY | Age: 43
End: 2018-09-11
Payer: COMMERCIAL

## 2018-09-11 ENCOUNTER — HOSPITAL ENCOUNTER (OUTPATIENT)
Age: 43
Setting detail: HOSPITAL OUTPATIENT SURGERY
Discharge: HOME | End: 2018-09-11
Attending: INTERNAL MEDICINE | Admitting: INTERNAL MEDICINE
Payer: COMMERCIAL

## 2018-09-11 ENCOUNTER — ANESTHESIA (OUTPATIENT)
Dept: ENDOSCOPY | Age: 43
End: 2018-09-11
Payer: COMMERCIAL

## 2018-09-11 VITALS
WEIGHT: 148 LBS | HEIGHT: 63 IN | DIASTOLIC BLOOD PRESSURE: 86 MMHG | BODY MASS INDEX: 26.22 KG/M2 | SYSTOLIC BLOOD PRESSURE: 127 MMHG | TEMPERATURE: 98 F | RESPIRATION RATE: 16 BRPM | OXYGEN SATURATION: 100 % | HEART RATE: 57 BPM

## 2018-09-11 DIAGNOSIS — Z80.0 FAMILY HISTORY OF COLON CANCER: ICD-10-CM

## 2018-09-11 PROBLEM — K64.8 INTERNAL HEMORRHOIDS WITHOUT COMPLICATION: Status: ACTIVE | Noted: 2018-09-11

## 2018-09-11 PROCEDURE — 45378 DIAGNOSTIC COLONOSCOPY: CPT | Performed by: INTERNAL MEDICINE

## 2018-09-11 RX ORDER — SODIUM CHLORIDE, SODIUM LACTATE, POTASSIUM CHLORIDE, CALCIUM CHLORIDE 600; 310; 30; 20 MG/100ML; MG/100ML; MG/100ML; MG/100ML
INJECTION, SOLUTION INTRAVENOUS CONTINUOUS
Status: DISCONTINUED | OUTPATIENT
Start: 2018-09-11 | End: 2018-09-11

## 2018-09-11 RX ORDER — MIDAZOLAM HYDROCHLORIDE 1 MG/ML
INJECTION INTRAMUSCULAR; INTRAVENOUS AS NEEDED
Status: DISCONTINUED | OUTPATIENT
Start: 2018-09-11 | End: 2018-09-11 | Stop reason: SURG

## 2018-09-11 RX ADMIN — SODIUM CHLORIDE, SODIUM LACTATE, POTASSIUM CHLORIDE, CALCIUM CHLORIDE: 600; 310; 30; 20 INJECTION, SOLUTION INTRAVENOUS at 14:42:00

## 2018-09-11 RX ADMIN — MIDAZOLAM HYDROCHLORIDE 2 MG: 1 INJECTION INTRAMUSCULAR; INTRAVENOUS at 14:57:00

## 2018-09-11 NOTE — H&P
History & Physical Examination    Patient Name: Lord Zurita  MRN: [id number]  CSN: [id number]  YOB: 1975    Diagnosis: Family history of colon cancer      Medications Prior to Admission:  Montelukast Sodium (SINGULAIR) 10 MG Oral Tab surgery/metal implant  6/23/2016: REMOVAL OF TONSILS,12+ Y/O  6/23/2016: REPAIR OF NASAL SEPTUM  Family History   Problem Relation Age of Onset   • Cancer Father    • Colon Cancer Father         Cause of death   • Stroke Mother         Cause of death   • H

## 2018-09-11 NOTE — ANESTHESIA POSTPROCEDURE EVALUATION
Patient: Dora Lemon    Procedure Summary     Date:  09/11/18 Room / Location:  [facility] ENDOSCOPY 01 / [facility] ENDO    Anesthesia Start:  8596 Anesthesia Stop:      Procedure:  COLONOSCOPY (N/A ) Diagnosis:       Family history of colon cancer      (noo

## 2018-09-11 NOTE — BRIEF OP NOTE
Pre-Operative Diagnosis: Family history of colon cancer      Post-Operative Diagnosis: Normal colonoscopy, internal hemorrhoids     Procedure Performed:   Procedure(s):  COLONOSCOPY    Surgeon(s) and Role:     * Chuyita Renteria MD - Primary

## 2018-09-11 NOTE — ANESTHESIA PREPROCEDURE EVALUATION
Anesthesia PreOp Note    HPI:     Mamadou Agustin is a 38year old female who presents for preoperative consultation requested by: Tod Mccarthy MD    Date of Surgery: 9/11/2018    Procedure(s):  COLONOSCOPY  Indication: Family history of times every day a thin film to the affected skin areas Disp: 80 g Rfl: 3 Taking   Azelastine HCl (ASTELIN) 0.1 % Nasal Solution  Disp:  Rfl: 6 Taking   Emollient (ELETONE) Apply Externally Cream Use bid Disp: 200 g Rfl: 6 Taking   calcipotriene (DOVONEX) 0 Not Asked        Hobby Hazards: Not Asked        Sleep Concern: Not Asked        Stress Concern: Not Asked        Weight Concern: Not Asked        Special Diet: Not Asked        Back Care: Not Asked        Exercise: Not Asked        Bike Helmet: Not Asked including possible dental damage if relevant, major complications, and any alternative forms of anesthetic management. All of the patient's questions were answered to the best of my ability. The patient desires the anesthetic management as planned.   ADRIANA

## 2018-09-12 NOTE — OPERATIVE REPORT
[facility]    PATIENT'S NAME: Sarah Dumont   ATTENDING PHYSICIAN: Fletcher Narvaez MD   OPERATING PHYSICIAN: Fletcher Narvaez MD   PATIENT ACCOUNT#:   [id number]    LOCATION:  [address],[facility] 1 ENDO POOL ROOMS 5 [facility] [address] [facility]  [id number]  EMS/

## 2018-09-13 RX ORDER — MONTELUKAST SODIUM 10 MG/1
TABLET ORAL
Qty: 30 TABLET | Refills: 0 | Status: SHIPPED | OUTPATIENT
Start: 2018-09-13 | End: 2018-10-12

## 2018-09-13 NOTE — TELEPHONE ENCOUNTER
Refill requested for    Name from pharmacy: MONTELUKAST 10MG TABLETS         Will file in chart as: MONTELUKAST SODIUM 10 MG Oral Tab    Sig: TAKE 1 TABLET(10 MG) BY MOUTH EVERY NIGHT    Disp:  30 tablet    Refills:  0    Start: 9/13/2018     Class: Normal

## 2018-09-13 NOTE — TELEPHONE ENCOUNTER
Singular refill ×1 month.   Please call to schedule a follow-up appointment within the next month for further refills

## 2018-09-16 ENCOUNTER — TELEPHONE (OUTPATIENT)
Dept: GASTROENTEROLOGY | Facility: CLINIC | Age: 43
End: 2018-09-16

## 2018-10-04 ENCOUNTER — PATIENT MESSAGE (OUTPATIENT)
Dept: INTERNAL MEDICINE CLINIC | Facility: CLINIC | Age: 43
End: 2018-10-04

## 2018-10-05 NOTE — TELEPHONE ENCOUNTER
From: Melissa Lazo  To: Britta Nissen, MD  Sent: 10/4/2018 3:41 PM CDT  Subject: Non-Urgent Medical Question    Is it possible to get a letter to be exempt from the fitness part this assessment they give at work?  I have done it the past two (2) leigh

## 2018-10-10 ENCOUNTER — NURSE ONLY (OUTPATIENT)
Dept: OBGYN CLINIC | Facility: CLINIC | Age: 43
End: 2018-10-10
Payer: COMMERCIAL

## 2018-10-10 ENCOUNTER — OFFICE VISIT (OUTPATIENT)
Dept: DERMATOLOGY CLINIC | Facility: CLINIC | Age: 43
End: 2018-10-10
Payer: COMMERCIAL

## 2018-10-10 VITALS
WEIGHT: 149 LBS | HEART RATE: 61 BPM | BODY MASS INDEX: 26 KG/M2 | DIASTOLIC BLOOD PRESSURE: 84 MMHG | SYSTOLIC BLOOD PRESSURE: 122 MMHG

## 2018-10-10 VITALS — HEIGHT: 63 IN | WEIGHT: 149 LBS | BODY MASS INDEX: 26.4 KG/M2

## 2018-10-10 DIAGNOSIS — Z51.81 MEDICATION MONITORING ENCOUNTER: ICD-10-CM

## 2018-10-10 DIAGNOSIS — D23.30 BENIGN NEOPLASM OF SKIN OF FACE: ICD-10-CM

## 2018-10-10 DIAGNOSIS — D23.4 BENIGN NEOPLASM OF SCALP AND SKIN OF NECK: ICD-10-CM

## 2018-10-10 DIAGNOSIS — D23.5 BENIGN NEOPLASM OF SKIN OF TRUNK, EXCEPT SCROTUM: ICD-10-CM

## 2018-10-10 DIAGNOSIS — L40.9 PSORIASIS: Primary | ICD-10-CM

## 2018-10-10 DIAGNOSIS — Z30.42 DEPO-PROVERA CONTRACEPTIVE STATUS: Primary | ICD-10-CM

## 2018-10-10 DIAGNOSIS — L40.8 OTHER PSORIASIS: ICD-10-CM

## 2018-10-10 DIAGNOSIS — D23.60 BENIGN NEOPLASM OF SKIN OF UPPER LIMB, INCLUDING SHOULDER, UNSPECIFIED LATERALITY: ICD-10-CM

## 2018-10-10 PROCEDURE — 99212 OFFICE O/P EST SF 10 MIN: CPT | Performed by: DERMATOLOGY

## 2018-10-10 PROCEDURE — 99214 OFFICE O/P EST MOD 30 MIN: CPT | Performed by: DERMATOLOGY

## 2018-10-11 PROCEDURE — 96372 THER/PROPH/DIAG INJ SC/IM: CPT | Performed by: OBSTETRICS & GYNECOLOGY

## 2018-10-11 RX ORDER — MEDROXYPROGESTERONE ACETATE 150 MG/ML
150 INJECTION, SUSPENSION INTRAMUSCULAR
Status: SHIPPED | OUTPATIENT
Start: 2018-10-11 | End: 2020-01-04

## 2018-10-11 RX ADMIN — MEDROXYPROGESTERONE ACETATE 150 MG: 150 INJECTION, SUSPENSION INTRAMUSCULAR at 14:53:00

## 2018-10-13 NOTE — TELEPHONE ENCOUNTER
Dr. Sharda Barillas, please advise. To: HORACE RN TRIAGE      From: Zack Rojas      Created: 10/13/2018 6:35 AM        *-*-*This message has not been handled. *-*-*    Hi Dr. Sharda Barillas:     It is only when I take the fitness test at work once a year and I was

## 2018-10-15 RX ORDER — MONTELUKAST SODIUM 10 MG/1
TABLET ORAL
Qty: 30 TABLET | Refills: 0 | Status: SHIPPED | OUTPATIENT
Start: 2018-10-15 | End: 2018-11-22

## 2018-10-15 NOTE — TELEPHONE ENCOUNTER
Left message informing patient she is due for a follow up visit. No additional refills until seen in office.

## 2018-10-15 NOTE — TELEPHONE ENCOUNTER
Pt last seen in Allergy 8/14/2018 for  . . .    Chronic sinusitis, unspecified location  (primary encounter diagnosis)  Allergic rhinitis, unspecified seasonality, unspecified trigger     Refill request received for . . .     MONTELUKAST SODIUM 10 MG Oral T

## 2018-10-15 NOTE — TELEPHONE ENCOUNTER
Singular refill times 1 month. Please call patient to schedule a follow-up appointment to assess response to treatment.   Patient last seen in August 2018 as a new patient

## 2018-10-18 ENCOUNTER — PATIENT MESSAGE (OUTPATIENT)
Dept: INTERNAL MEDICINE CLINIC | Facility: CLINIC | Age: 43
End: 2018-10-18

## 2018-10-18 NOTE — TELEPHONE ENCOUNTER
Dimple Thompson to write a letter excusing her form   exercise portion of her fitness eval  According to patient  She has difficulty of breathing during the exam

## 2018-10-19 RX ORDER — APREMILAST 30 MG/1
TABLET, FILM COATED ORAL
Qty: 60 TABLET | Refills: 12 | Status: SHIPPED | OUTPATIENT
Start: 2018-10-19 | End: 2019-09-18

## 2018-10-19 NOTE — TELEPHONE ENCOUNTER
From: Marcelle Campuzano  To: Dylan Clement MD  Sent: 10/18/2018 7:34 PM CDT  Subject: Non-Urgent Medical Question    I’m sorry, but I do not see the letter under the letters tab.     Thanks,  [employer]

## 2018-10-21 NOTE — PROGRESS NOTES
Austin Larson is a 37year old female. HPI:     CC:  Patient presents with:  Psoriasis: follow up ,medication refill         Allergies:  Patient has no known allergies.     HISTORY:    Past Medical History:   Diagnosis Date   • Conjunctival cyst 8/1/1 Emollient (ELETONE) Apply Externally Cream Use bid Disp: 200 g Rfl: 6   calcipotriene (DOVONEX) 0.005 % Apply Externally Cream Apply  topically.  apply by TOPICAL route 2 times every day a thin film to the affected skin areasand rub in gently and complete Smoking status: Never Smoker      Smokeless tobacco: Never Used    Substance and Sexual Activity      Alcohol use: No        Alcohol/week: 0.0 oz        Comment: rarely      Drug use: No        Comment: none      Sexual activity: Yes        Partners: Male complaints. No new or changeing lesions other than noted above. No fevers, chills, night sweats, unusual sun sensitivity. No other skin complaints. History, medications, allergies reviewed as noted.        Physical Examination:     Well-developed w involvement. Including scalp. Elbows knees more persistent scattered lesions on trunk and extremities. Stable doing well nearly clear on Otezla    Arthritis okay nails improved will treat with medications and grid.   Overall skincare, liberal use of emol

## 2018-10-23 NOTE — TELEPHONE ENCOUNTER
LOV:11-30  Last Filled:12-27, #30 with 0 refill  Labs:10-26, ALT 9 and AST 13  No future appointments.     Please Advise 61

## 2018-11-23 RX ORDER — MONTELUKAST SODIUM 10 MG/1
TABLET ORAL
Qty: 30 TABLET | Refills: 0 | Status: SHIPPED | OUTPATIENT
Start: 2018-11-23 | End: 2018-12-01

## 2018-11-23 NOTE — TELEPHONE ENCOUNTER
Patient is requesting a refill of Singulair 10 MG- 1 tablet by mouth daily. Patient was last seen for an office visit for allergies 8-14-18. Refill reuqest was denied on 10-22-18 due to patient needed an appointment.   Appointment scheduled for Dec.1,18 a

## 2018-11-27 RX ORDER — LOSARTAN POTASSIUM 50 MG/1
TABLET ORAL
Qty: 90 TABLET | Refills: 3 | Status: SHIPPED | OUTPATIENT
Start: 2018-11-27 | End: 2019-11-19

## 2018-12-01 ENCOUNTER — OFFICE VISIT (OUTPATIENT)
Dept: ALLERGY | Facility: CLINIC | Age: 43
End: 2018-12-01
Payer: COMMERCIAL

## 2018-12-01 VITALS
DIASTOLIC BLOOD PRESSURE: 72 MMHG | OXYGEN SATURATION: 97 % | RESPIRATION RATE: 17 BRPM | SYSTOLIC BLOOD PRESSURE: 142 MMHG | HEART RATE: 70 BPM | TEMPERATURE: 97 F

## 2018-12-01 DIAGNOSIS — J32.9 CHRONIC SINUSITIS, UNSPECIFIED LOCATION: Primary | ICD-10-CM

## 2018-12-01 DIAGNOSIS — J30.9 ALLERGIC RHINITIS, UNSPECIFIED SEASONALITY, UNSPECIFIED TRIGGER: ICD-10-CM

## 2018-12-01 PROCEDURE — 99214 OFFICE O/P EST MOD 30 MIN: CPT | Performed by: ALLERGY & IMMUNOLOGY

## 2018-12-01 PROCEDURE — 99212 OFFICE O/P EST SF 10 MIN: CPT | Performed by: ALLERGY & IMMUNOLOGY

## 2018-12-01 RX ORDER — MOMETASONE 50 UG/1
2 SPRAY, METERED NASAL DAILY
Qty: 1 INHALER | Refills: 5 | Status: SHIPPED | OUTPATIENT
Start: 2018-12-01 | End: 2020-04-17

## 2018-12-01 RX ORDER — MONTELUKAST SODIUM 10 MG/1
10 TABLET ORAL NIGHTLY
Qty: 90 TABLET | Refills: 1 | Status: SHIPPED | OUTPATIENT
Start: 2018-12-01 | End: 2019-06-25

## 2018-12-01 RX ORDER — AZELASTINE 1 MG/ML
2 SPRAY, METERED NASAL 2 TIMES DAILY
Qty: 1 BOTTLE | Refills: 5 | Status: SHIPPED | OUTPATIENT
Start: 2018-12-01 | End: 2020-04-17

## 2018-12-01 NOTE — PATIENT INSTRUCTIONS
Continue with current regimen including Singulair, montelukast 10 mg once a day  Astelin 1-2 sprays per nostril twice a day as an intranasal antihistamine  Add Nasonex/mometasone 2 sprays per nostril once a day as a nasal steroid if refractory  Sinus rinse

## 2018-12-01 NOTE — PROGRESS NOTES
Liza Tiwari is a 37year old female. HPI:   Patient presents with: Allergies: PT reports her symptoms were better, but over the last three weeks stuffy nose, and congestion is back. Some PND, inside nose is dry and bloody.  No humidifier usage at History   Problem Relation Age of Onset   • Cancer Father    • Colon Cancer Father         Cause of death   • Stroke Mother         Cause of death   • Hypertension Mother    • Diabetes Maternal Grandmother    • Glaucoma Neg    • Macular degeneration Neg Negative night sweats,weight loss, irritability and lethargy  ENMT:  Negative for ear drainage, hearing loss and nasal drainage  Eyes:  Negative for eye discharge and vision loss  Gastrointestinal:  Negative for abdominal pain, diarrhea and vomiting  Integ total) by mouth nightly. • Azelastine HCl 0.1 % Nasal Solution 1 Bottle 5     Si sprays by Nasal route 2 (two) times daily. • Mometasone Furoate (NASONEX) 50 MCG/ACT Nasal Suspension 1 Inhaler 5     Si sprays by Nasal route daily.        Imaging

## 2018-12-05 ENCOUNTER — LAB ENCOUNTER (OUTPATIENT)
Dept: LAB | Age: 43
End: 2018-12-05
Attending: INTERNAL MEDICINE
Payer: COMMERCIAL

## 2018-12-05 DIAGNOSIS — R82.90 ABNORMAL URINALYSIS: ICD-10-CM

## 2018-12-05 PROCEDURE — 87086 URINE CULTURE/COLONY COUNT: CPT

## 2018-12-05 PROCEDURE — 81001 URINALYSIS AUTO W/SCOPE: CPT

## 2018-12-05 PROCEDURE — 87147 CULTURE TYPE IMMUNOLOGIC: CPT

## 2018-12-12 ENCOUNTER — OFFICE VISIT (OUTPATIENT)
Dept: OTOLARYNGOLOGY | Facility: CLINIC | Age: 43
End: 2018-12-12
Payer: COMMERCIAL

## 2018-12-12 VITALS — WEIGHT: 145 LBS | HEIGHT: 63 IN | BODY MASS INDEX: 25.69 KG/M2

## 2018-12-12 DIAGNOSIS — J01.91 ACUTE RECURRENT SINUSITIS, UNSPECIFIED LOCATION: Primary | ICD-10-CM

## 2018-12-12 PROCEDURE — 99212 OFFICE O/P EST SF 10 MIN: CPT | Performed by: OTOLARYNGOLOGY

## 2018-12-12 PROCEDURE — 99213 OFFICE O/P EST LOW 20 MIN: CPT | Performed by: OTOLARYNGOLOGY

## 2018-12-12 RX ORDER — AMOXICILLIN AND CLAVULANATE POTASSIUM 875; 125 MG/1; MG/1
1 TABLET, FILM COATED ORAL 2 TIMES DAILY
Qty: 14 TABLET | Refills: 1 | Status: SHIPPED | OUTPATIENT
Start: 2018-12-12 | End: 2018-12-19

## 2018-12-12 NOTE — PROGRESS NOTES
Kirsty Diallo is a 37year old female.   Patient presents with:  Nose Problem: post nasal drip, sinus pressure, body aches, teeth pain for 3 days  Throat Problem: throat pain,  was postive for strep throat       HISTORY OF PRESENT ILLNESS  12/12 Corneal ulcer- right eye 2/17/2016   • High blood pressure    • Hx of tonsillectomy 06/23/16   • Hypertension    • Internal hemorrhoids without complication 5/33/5489   • Psoriasis        Past Surgical History:   Procedure Laterality Date   • COLONOSCOPY N Normal. TM - Right: Normal, Left: Normal. Hearing is susie   Skin Normal Inspection - Normal.        Lymph Detail Normal Submental. Submandibular. Anterior cervical. Posterior cervical. Supraclavicular.               Nose/Mouth/Throat abNormal Nares - Right contribute to these episodes I reccomend starting the allergy medication prior to the season. Patient was instructed to call if symptoms do not resolve.         Reuben Davis MD

## 2018-12-17 ENCOUNTER — TELEPHONE (OUTPATIENT)
Dept: OBGYN CLINIC | Facility: CLINIC | Age: 43
End: 2018-12-17

## 2018-12-17 NOTE — TELEPHONE ENCOUNTER
Pt calling to schedule Depo-Provera injection. Pts last depo injection was 10/10/18. Pt depo-provera window is 12/26/18-1/9/19. Assisted pt with scheduling appt for depo-provera on 12/26/18 at 10am. Pt verbalized understanding.

## 2018-12-18 ENCOUNTER — PATIENT MESSAGE (OUTPATIENT)
Dept: OTOLARYNGOLOGY | Facility: CLINIC | Age: 43
End: 2018-12-18

## 2018-12-18 NOTE — TELEPHONE ENCOUNTER
From: Austin Larson  To: Chriss Thakkar MD  Sent: 12/18/2018 9:32 AM CST  Subject: Prescription Question    Dear Dr. Brendan Snyder:    Is it possible to get a different type of antibiotic as Amox-Clav is making me sick to my stomach.  I think I might have had

## 2018-12-20 RX ORDER — DOXYCYCLINE HYCLATE 100 MG
100 TABLET ORAL 2 TIMES DAILY
Qty: 20 TABLET | Refills: 0 | Status: SHIPPED | OUTPATIENT
Start: 2018-12-20 | End: 2018-12-30

## 2018-12-24 RX ORDER — MONTELUKAST SODIUM 10 MG/1
TABLET ORAL
Qty: 30 TABLET | Refills: 0 | OUTPATIENT
Start: 2018-12-24

## 2018-12-26 ENCOUNTER — NURSE ONLY (OUTPATIENT)
Dept: OBGYN CLINIC | Facility: CLINIC | Age: 43
End: 2018-12-26
Payer: COMMERCIAL

## 2018-12-26 VITALS
BODY MASS INDEX: 26 KG/M2 | WEIGHT: 148 LBS | DIASTOLIC BLOOD PRESSURE: 75 MMHG | HEART RATE: 66 BPM | SYSTOLIC BLOOD PRESSURE: 115 MMHG

## 2018-12-26 DIAGNOSIS — Z30.42 DEPO-PROVERA CONTRACEPTIVE STATUS: Primary | ICD-10-CM

## 2018-12-26 PROCEDURE — 96372 THER/PROPH/DIAG INJ SC/IM: CPT | Performed by: OBSTETRICS & GYNECOLOGY

## 2018-12-26 RX ADMIN — MEDROXYPROGESTERONE ACETATE 150 MG: 150 INJECTION, SUSPENSION INTRAMUSCULAR at 09:57:00

## 2018-12-26 NOTE — PROGRESS NOTES
Pt here today for Depo Provera injection, no complaints, pt tolerated the injection well, next one will be due between 03/13/19 and 03/27/19,pt will schedule Depo Provera and Annual Exam at the same time.

## 2019-01-22 ENCOUNTER — OFFICE VISIT (OUTPATIENT)
Dept: INTERNAL MEDICINE CLINIC | Facility: CLINIC | Age: 44
End: 2019-01-22
Payer: COMMERCIAL

## 2019-01-22 ENCOUNTER — LAB ENCOUNTER (OUTPATIENT)
Dept: LAB | Facility: HOSPITAL | Age: 44
End: 2019-01-22
Attending: INTERNAL MEDICINE
Payer: COMMERCIAL

## 2019-01-22 VITALS
HEIGHT: 63 IN | HEART RATE: 98 BPM | DIASTOLIC BLOOD PRESSURE: 74 MMHG | BODY MASS INDEX: 25.8 KG/M2 | SYSTOLIC BLOOD PRESSURE: 107 MMHG | TEMPERATURE: 98 F | WEIGHT: 145.63 LBS

## 2019-01-22 DIAGNOSIS — R19.7 DIARRHEA, UNSPECIFIED TYPE: Primary | ICD-10-CM

## 2019-01-22 DIAGNOSIS — R19.7 DIARRHEA, UNSPECIFIED TYPE: ICD-10-CM

## 2019-01-22 PROBLEM — K64.8 INTERNAL HEMORRHOIDS WITHOUT COMPLICATION: Status: RESOLVED | Noted: 2018-09-11 | Resolved: 2019-01-22

## 2019-01-22 LAB
ALBUMIN SERPL BCP-MCNC: 3.9 G/DL (ref 3.5–4.8)
ALP SERPL-CCNC: 54 U/L (ref 32–100)
ALT SERPL-CCNC: 9 U/L (ref 14–54)
ANION GAP SERPL CALC-SCNC: 12 MMOL/L (ref 0–18)
AST SERPL-CCNC: 18 U/L (ref 15–41)
BASOPHILS # BLD: 0 K/UL (ref 0–0.2)
BASOPHILS NFR BLD: 0 %
BILIRUB DIRECT SERPL-MCNC: 0.1 MG/DL (ref 0–0.2)
BILIRUB SERPL-MCNC: 0.7 MG/DL (ref 0.3–1.2)
BILIRUB UR QL: NEGATIVE
BUN SERPL-MCNC: 14 MG/DL (ref 8–20)
BUN/CREAT SERPL: 15.6 (ref 10–20)
CALCIUM SERPL-MCNC: 9 MG/DL (ref 8.5–10.5)
CHLORIDE SERPL-SCNC: 102 MMOL/L (ref 95–110)
CO2 SERPL-SCNC: 20 MMOL/L (ref 22–32)
COLOR UR: YELLOW
CREAT SERPL-MCNC: 0.9 MG/DL (ref 0.5–1.5)
EOSINOPHIL # BLD: 0 K/UL (ref 0–0.7)
EOSINOPHIL NFR BLD: 0 %
ERYTHROCYTE [DISTWIDTH] IN BLOOD BY AUTOMATED COUNT: 14.6 % (ref 11–15)
GLUCOSE SERPL-MCNC: 96 MG/DL (ref 70–99)
GLUCOSE UR-MCNC: NEGATIVE MG/DL
HCT VFR BLD AUTO: 39.6 % (ref 35–48)
HGB BLD-MCNC: 13.2 G/DL (ref 12–16)
LYMPHOCYTES # BLD: 1.8 K/UL (ref 1–4)
LYMPHOCYTES NFR BLD: 21 %
MCH RBC QN AUTO: 29.2 PG (ref 27–32)
MCHC RBC AUTO-ENTMCNC: 33.2 G/DL (ref 32–37)
MCV RBC AUTO: 87.9 FL (ref 80–100)
MONOCYTES # BLD: 0.7 K/UL (ref 0–1)
MONOCYTES NFR BLD: 8 %
NEUTROPHILS # BLD AUTO: 6.2 K/UL (ref 1.8–7.7)
NEUTROPHILS NFR BLD: 70 %
NITRITE UR QL STRIP.AUTO: NEGATIVE
OSMOLALITY UR CALC.SUM OF ELEC: 278 MOSM/KG (ref 275–295)
PH UR: 5 [PH] (ref 5–8)
PLATELET # BLD AUTO: 340 K/UL (ref 140–400)
PMV BLD AUTO: 7.8 FL (ref 7.4–10.3)
POTASSIUM SERPL-SCNC: 3.8 MMOL/L (ref 3.3–5.1)
PROT SERPL-MCNC: 7.6 G/DL (ref 5.9–8.4)
PROT UR-MCNC: 30 MG/DL
RBC # BLD AUTO: 4.5 M/UL (ref 3.7–5.4)
RBC #/AREA URNS AUTO: 16 /HPF
SODIUM SERPL-SCNC: 134 MMOL/L (ref 136–144)
SP GR UR STRIP: 1.03 (ref 1–1.03)
UROBILINOGEN UR STRIP-ACNC: <2
VIT C UR-MCNC: NEGATIVE MG/DL
WBC # BLD AUTO: 8.8 K/UL (ref 4–11)
WBC #/AREA URNS AUTO: 143 /HPF

## 2019-01-22 PROCEDURE — 99212 OFFICE O/P EST SF 10 MIN: CPT | Performed by: INTERNAL MEDICINE

## 2019-01-22 PROCEDURE — 36415 COLL VENOUS BLD VENIPUNCTURE: CPT

## 2019-01-22 PROCEDURE — 80076 HEPATIC FUNCTION PANEL: CPT

## 2019-01-22 PROCEDURE — 81001 URINALYSIS AUTO W/SCOPE: CPT

## 2019-01-22 PROCEDURE — 87077 CULTURE AEROBIC IDENTIFY: CPT

## 2019-01-22 PROCEDURE — 85025 COMPLETE CBC W/AUTO DIFF WBC: CPT

## 2019-01-22 PROCEDURE — 87086 URINE CULTURE/COLONY COUNT: CPT

## 2019-01-22 PROCEDURE — 99214 OFFICE O/P EST MOD 30 MIN: CPT | Performed by: INTERNAL MEDICINE

## 2019-01-22 PROCEDURE — 80048 BASIC METABOLIC PNL TOTAL CA: CPT

## 2019-01-23 NOTE — PROGRESS NOTES
HPI:    Patient ID: Kenan Mata is a 37year old female.     HPI  Nausea vomiting diarrhea started yesterday several watery stools during the day sometimes it looks like tomato soup it looks like the tomato soup she had eaten she has no fevers had so Oral Tab Take 1 tablet (10 mg total) by mouth nightly. Disp: 90 tablet Rfl: 1   Azelastine HCl 0.1 % Nasal Solution 2 sprays by Nasal route 2 (two) times daily.  Disp: 1 Bottle Rfl: 5   Mometasone Furoate (NASONEX) 50 MCG/ACT Nasal Suspension 2 sprays by Na Cause of death   • Stroke Mother         Cause of death   • Hypertension Mother    • Diabetes Maternal Grandmother    • Glaucoma Neg    • Macular degeneration Neg       Social History: Social History    Tobacco Use      Smoking status: Never Smoker      Sm studies advised oral hydration 64 ounces per day or until her urine is crystal clear if unable to oral hydrate patient is advised to go to the emergency room for evaluation soft bland diet rest given a note to be off work return this Friday follow-up if sh

## 2019-02-09 ENCOUNTER — OFFICE VISIT (OUTPATIENT)
Dept: OTOLARYNGOLOGY | Facility: CLINIC | Age: 44
End: 2019-02-09
Payer: COMMERCIAL

## 2019-02-09 VITALS
TEMPERATURE: 98 F | DIASTOLIC BLOOD PRESSURE: 87 MMHG | SYSTOLIC BLOOD PRESSURE: 122 MMHG | RESPIRATION RATE: 18 BRPM | HEIGHT: 63 IN | HEART RATE: 61 BPM | BODY MASS INDEX: 25.69 KG/M2 | WEIGHT: 145 LBS

## 2019-02-09 DIAGNOSIS — J01.91 ACUTE RECURRENT SINUSITIS, UNSPECIFIED LOCATION: Primary | ICD-10-CM

## 2019-02-09 PROCEDURE — 99213 OFFICE O/P EST LOW 20 MIN: CPT | Performed by: OTOLARYNGOLOGY

## 2019-02-09 PROCEDURE — 99212 OFFICE O/P EST SF 10 MIN: CPT | Performed by: OTOLARYNGOLOGY

## 2019-02-09 RX ORDER — DOXYCYCLINE 100 MG/1
100 TABLET ORAL DAILY
Qty: 10 TABLET | Refills: 0 | Status: SHIPPED | OUTPATIENT
Start: 2019-02-09 | End: 2019-02-19

## 2019-02-09 NOTE — PROGRESS NOTES
You Alvarado is a 37year old female. Patient presents with:  Sinus Problem: c/o sinus pressure, post nasal drip, fatigue, mouth pain when yawning x 2 weeks. denies fevers/chills.     HPI:   She is been experiencing facial pressure and congestion with 9/11/2018   • Psoriasis       Social History:  Social History    Tobacco Use      Smoking status: Never Smoker      Smokeless tobacco: Never Used    Alcohol use: No      Alcohol/week: 0.0 oz      Comment: rarely    Drug use: No      Comment: none       REV patient indicates understanding of these issues and agrees to the plan. Shubham Brito MD  2/9/2019  2:40 PM

## 2019-02-11 ENCOUNTER — PATIENT MESSAGE (OUTPATIENT)
Dept: OTOLARYNGOLOGY | Facility: CLINIC | Age: 44
End: 2019-02-11

## 2019-02-11 NOTE — TELEPHONE ENCOUNTER
From: [place] Bulls  To: Noa Cho MD  Sent: 2/11/2019 9:50 AM CST  Subject: Visit Armin Brito:    I forgot to ask you on Saturday to fill out my FMLA form for my employer.  I have only been out of the office on December 12

## 2019-02-13 ENCOUNTER — TELEPHONE (OUTPATIENT)
Dept: OTOLARYNGOLOGY | Facility: CLINIC | Age: 44
End: 2019-02-13

## 2019-03-15 ENCOUNTER — OFFICE VISIT (OUTPATIENT)
Dept: OBGYN CLINIC | Facility: CLINIC | Age: 44
End: 2019-03-15
Payer: COMMERCIAL

## 2019-03-15 VITALS
SYSTOLIC BLOOD PRESSURE: 119 MMHG | HEART RATE: 71 BPM | WEIGHT: 146 LBS | BODY MASS INDEX: 25.55 KG/M2 | DIASTOLIC BLOOD PRESSURE: 79 MMHG | HEIGHT: 63.5 IN

## 2019-03-15 DIAGNOSIS — Z12.31 ENCOUNTER FOR SCREENING MAMMOGRAM FOR BREAST CANCER: ICD-10-CM

## 2019-03-15 DIAGNOSIS — Z01.419 ENCOUNTER FOR GYNECOLOGICAL EXAMINATION: Primary | ICD-10-CM

## 2019-03-15 PROCEDURE — 99396 PREV VISIT EST AGE 40-64: CPT | Performed by: OBSTETRICS & GYNECOLOGY

## 2019-03-15 PROCEDURE — 96372 THER/PROPH/DIAG INJ SC/IM: CPT | Performed by: OBSTETRICS & GYNECOLOGY

## 2019-03-15 RX ORDER — MEDROXYPROGESTERONE ACETATE 150 MG/ML
150 INJECTION, SUSPENSION INTRAMUSCULAR
Status: COMPLETED | OUTPATIENT
Start: 2019-03-15 | End: 2019-11-16

## 2019-03-15 RX ADMIN — MEDROXYPROGESTERONE ACETATE 150 MG: 150 INJECTION, SUSPENSION INTRAMUSCULAR at 09:11:00

## 2019-03-15 NOTE — PROGRESS NOTES
Depo provera injection given to pt's right upper outer gluteus. Pt tolerated well. Pt instructed to return for her next injection between 5/31/19 and 6/14/19.

## 2019-03-15 NOTE — PROGRESS NOTES
Maco Hennessy is a 37year old female  No LMP recorded. Patient has had an injection. here for annual exam.       Last seen 18. Last pap  normal with neg HPV. Had to stop ocp due to HTN. Doing well with Depo provera.   No period si Mometasone Furoate (NASONEX) 50 MCG/ACT Nasal Suspension 2 sprays by Nasal route daily.  Disp: 1 Inhaler Rfl: 5   LOSARTAN POTASSIUM 50 MG Oral Tab TAKE 1 TABLET(50 MG) BY MOUTH DAILY Disp: 90 tablet Rfl: 3   OTEZLA 30 MG Oral Tab TAKE 1 TABLET TWICE A DA 25.46 kg/m².     Constitutional: well developed, well nourished  Neck/Thyroid: thyroid symmetric, no nodules  Heart:  Regular rate and rhythm  Lungs:  Clear to asculation  Breast: normal without palpable masses, tenderness, asymmetry, nipple discharge, nipp

## 2019-03-30 ENCOUNTER — HOSPITAL ENCOUNTER (OUTPATIENT)
Dept: MAMMOGRAPHY | Age: 44
Discharge: HOME | End: 2019-03-30
Attending: OBSTETRICS & GYNECOLOGY
Payer: COMMERCIAL

## 2019-03-30 DIAGNOSIS — Z12.31 ENCOUNTER FOR SCREENING MAMMOGRAM FOR BREAST CANCER: ICD-10-CM

## 2019-03-30 PROCEDURE — 77067 SCR MAMMO BI INCL CAD: CPT | Performed by: OBSTETRICS & GYNECOLOGY

## 2019-03-30 PROCEDURE — 77063 BREAST TOMOSYNTHESIS BI: CPT | Performed by: OBSTETRICS & GYNECOLOGY

## 2019-05-14 NOTE — TELEPHONE ENCOUNTER
LM for pt for details and to see if form is still needed 5-6-19    LM for pt to detail and see if form is needed.  5-14-19

## 2019-06-01 ENCOUNTER — NURSE ONLY (OUTPATIENT)
Dept: OBGYN CLINIC | Facility: CLINIC | Age: 44
End: 2019-06-01
Payer: COMMERCIAL

## 2019-06-01 VITALS
HEART RATE: 59 BPM | BODY MASS INDEX: 26 KG/M2 | WEIGHT: 149 LBS | SYSTOLIC BLOOD PRESSURE: 115 MMHG | DIASTOLIC BLOOD PRESSURE: 78 MMHG

## 2019-06-01 DIAGNOSIS — Z30.42 ENCOUNTER FOR SURVEILLANCE OF INJECTABLE CONTRACEPTIVE: Primary | ICD-10-CM

## 2019-06-01 PROCEDURE — 96372 THER/PROPH/DIAG INJ SC/IM: CPT | Performed by: OBSTETRICS & GYNECOLOGY

## 2019-06-01 RX ADMIN — MEDROXYPROGESTERONE ACETATE 150 MG: 150 INJECTION, SUSPENSION INTRAMUSCULAR at 08:49:00

## 2019-06-01 NOTE — PROGRESS NOTES
Pt was in office today for her depo injection. Pt's last annual was 3-15-19 JLK. Injection was given on the LT upper outer gluteus, pt tolerated well. Written instructions were given Aug 17- Aug 31, 2019 for next injection. Pt verbalized understanding.

## 2019-06-25 RX ORDER — MONTELUKAST SODIUM 10 MG/1
TABLET ORAL
Qty: 90 TABLET | Refills: 0 | Status: SHIPPED | OUTPATIENT
Start: 2019-06-25 | End: 2019-09-23

## 2019-06-25 NOTE — TELEPHONE ENCOUNTER
Pt last seen in Allergy 12/1/2018 for . . .    Chronic sinusitis, unspecified location  (primary encounter diagnosis)  Allergic rhinitis, unspecified seasonality, unspecified trigger     Refill request received for .  . .    Montelukast Sodium 10 MG Oral Ta

## 2019-07-17 ENCOUNTER — OFFICE VISIT (OUTPATIENT)
Dept: ALLERGY | Facility: CLINIC | Age: 44
End: 2019-07-17
Payer: COMMERCIAL

## 2019-07-17 VITALS
DIASTOLIC BLOOD PRESSURE: 84 MMHG | HEART RATE: 77 BPM | SYSTOLIC BLOOD PRESSURE: 121 MMHG | HEIGHT: 63 IN | TEMPERATURE: 99 F | BODY MASS INDEX: 25.69 KG/M2 | OXYGEN SATURATION: 99 % | RESPIRATION RATE: 16 BRPM | WEIGHT: 145 LBS

## 2019-07-17 DIAGNOSIS — J32.9 CHRONIC SINUSITIS, UNSPECIFIED LOCATION: Primary | ICD-10-CM

## 2019-07-17 DIAGNOSIS — J30.9 ALLERGIC RHINITIS, UNSPECIFIED SEASONALITY, UNSPECIFIED TRIGGER: ICD-10-CM

## 2019-07-17 PROCEDURE — 99214 OFFICE O/P EST MOD 30 MIN: CPT | Performed by: ALLERGY & IMMUNOLOGY

## 2019-07-17 RX ORDER — LEVOCETIRIZINE DIHYDROCHLORIDE 5 MG/1
5 TABLET, FILM COATED ORAL NIGHTLY
Qty: 30 TABLET | Refills: 0 | Status: SHIPPED | OUTPATIENT
Start: 2019-07-17 | End: 2019-08-13

## 2019-07-17 RX ORDER — METHYLPREDNISOLONE 4 MG/1
TABLET ORAL
Qty: 21 TABLET | Refills: 0 | Status: SHIPPED | OUTPATIENT
Start: 2019-07-17 | End: 2019-12-11 | Stop reason: ALTCHOICE

## 2019-07-17 NOTE — PATIENT INSTRUCTIONS
Recs: Continue with Singulair, Claritin, Sudafed, Azalastine and Nasonex  Start Xyzal, levocetirizine 5 mg once night at bedtime as an antihistamine  Add Medrol Dosepak in 3 to 5 days if not improving  Reviewed allergy avoidance measures as well as potenti

## 2019-07-17 NOTE — PROGRESS NOTES
You Alvarado is a 37year old female. HPI:   Patient presents with: Allergies: LOV 12/2018. Chronic sinusitis and AR. Currently taking Singulair, Claritin, Sudafed, Azalastine and Nasonex.   Patient does have some concerns regarding Singulair, d SEPTUM  6/23/2016      Family History   Problem Relation Age of Onset   • Cancer Father    • Colon Cancer Father         Cause of death   • Stroke Mother         Cause of death   • Hypertension Mother    • Diabetes Maternal Grandmother    • Glaucoma Neg hpi  Cardiovascular:  Negative for irregular heartbeat/palpitations, chest pain, edema  Constitutional:  Negative night sweats,weight loss, irritability and lethargy  ENMT:  Negative for ear drainage, hearing loss and nasal drainage  Eyes:  Negative for ey the defined types were placed in this encounter.       Meds This Visit:  Requested Prescriptions      No prescriptions requested or ordered in this encounter       Imaging & Referrals:  None     7/17/2019  Kevin Hernandez MD    If medication samples were

## 2019-08-13 RX ORDER — LEVOCETIRIZINE DIHYDROCHLORIDE 5 MG/1
TABLET, FILM COATED ORAL
Qty: 90 TABLET | Refills: 1 | Status: SHIPPED | OUTPATIENT
Start: 2019-08-13 | End: 2020-02-19

## 2019-08-17 ENCOUNTER — NURSE ONLY (OUTPATIENT)
Dept: OBGYN CLINIC | Facility: CLINIC | Age: 44
End: 2019-08-17
Payer: COMMERCIAL

## 2019-08-17 VITALS
DIASTOLIC BLOOD PRESSURE: 83 MMHG | SYSTOLIC BLOOD PRESSURE: 126 MMHG | HEART RATE: 61 BPM | BODY MASS INDEX: 26 KG/M2 | WEIGHT: 149.38 LBS

## 2019-08-17 DIAGNOSIS — Z30.42 ENCOUNTER FOR SURVEILLANCE OF INJECTABLE CONTRACEPTIVE: Primary | ICD-10-CM

## 2019-08-17 PROCEDURE — 96372 THER/PROPH/DIAG INJ SC/IM: CPT | Performed by: OBSTETRICS & GYNECOLOGY

## 2019-08-17 RX ADMIN — MEDROXYPROGESTERONE ACETATE 150 MG: 150 INJECTION, SUSPENSION INTRAMUSCULAR at 08:39:00

## 2019-08-17 NOTE — PROGRESS NOTES
Pt was in office today for her depo injection. Pt's last annual was 3-15-19 JLK. Injection was given on the RT upper outer gluteus, pt tolerated well. Written instructions were given Nov 2 - Nov 16 for next injection. Pt verbalized understanding.

## 2019-09-18 NOTE — TELEPHONE ENCOUNTER
LOV 10/10/2018. Pt does not have any f/u appointments currently scheduled. Last refilled on 10/19/2018 with 12 refills. Please advise on refill request. Thank you kindly.

## 2019-09-19 RX ORDER — APREMILAST 30 MG/1
TABLET, FILM COATED ORAL
Qty: 60 TABLET | Refills: 2 | Status: SHIPPED | OUTPATIENT
Start: 2019-09-19 | End: 2019-12-19

## 2019-09-19 NOTE — TELEPHONE ENCOUNTER
[id number] Juliet naidu Please have patient schedule appointment for further refills.  Due for f/u ~end of year

## 2019-09-23 RX ORDER — MONTELUKAST SODIUM 10 MG/1
TABLET ORAL
Qty: 90 TABLET | Refills: 1 | Status: SHIPPED | OUTPATIENT
Start: 2019-09-23 | End: 2020-03-17

## 2019-09-23 NOTE — TELEPHONE ENCOUNTER
Pt last seen in Allergy 7/17/2019 for . . .    Chronic sinusitis, unspecified location  (primary encounter diagnosis)  Allergic rhinitis, unspecified seasonality, unspecified trigger     Refill request received for  . . .     MONTELUKAST SODIUM 10 MG Oral T

## 2019-11-16 ENCOUNTER — NURSE ONLY (OUTPATIENT)
Dept: OBGYN CLINIC | Facility: CLINIC | Age: 44
End: 2019-11-16
Payer: COMMERCIAL

## 2019-11-16 DIAGNOSIS — Z30.42 ENCOUNTER FOR SURVEILLANCE OF INJECTABLE CONTRACEPTIVE: ICD-10-CM

## 2019-11-16 PROCEDURE — 96372 THER/PROPH/DIAG INJ SC/IM: CPT | Performed by: OBSTETRICS & GYNECOLOGY

## 2019-11-16 RX ADMIN — MEDROXYPROGESTERONE ACETATE 150 MG: 150 INJECTION, SUSPENSION INTRAMUSCULAR at 08:46:00

## 2019-11-16 NOTE — PROGRESS NOTES
Patient here today for Depo Provera injection. Patient's last Annual was 3/15/19 with JLK Injection given on the Left, upper outer Gluteus side patient tolerated well. Patient given written return instruction for Feb 1 - 15, 2020  for next injection.  Tressa

## 2019-11-19 RX ORDER — LOSARTAN POTASSIUM 50 MG/1
TABLET ORAL
Qty: 90 TABLET | Refills: 0 | Status: SHIPPED | OUTPATIENT
Start: 2019-11-19 | End: 2020-02-19

## 2019-11-22 ENCOUNTER — TELEPHONE (OUTPATIENT)
Dept: INTERNAL MEDICINE CLINIC | Facility: CLINIC | Age: 44
End: 2019-11-22

## 2019-11-22 NOTE — TELEPHONE ENCOUNTER
Patient scheduled through [facility]    Appointment For: Liza Tiwari ([id number])   Visit Type: [address] [place], [place] [address]y 6 ([id number])      12/23/2019   4:00 PM  20 mins. Von Akhtar MD       [facility]      Patient Comments:   High blood pressure and g

## 2019-11-22 NOTE — TELEPHONE ENCOUNTER
Spoke with patient. She states reason for appointment is a refill on her medication to control her blood pressure. Appointment kept on schedule.

## 2019-12-11 ENCOUNTER — LAB ENCOUNTER (OUTPATIENT)
Dept: LAB | Age: 44
End: 2019-12-11
Attending: INTERNAL MEDICINE
Payer: COMMERCIAL

## 2019-12-11 ENCOUNTER — OFFICE VISIT (OUTPATIENT)
Dept: INTERNAL MEDICINE CLINIC | Facility: CLINIC | Age: 44
End: 2019-12-11
Payer: COMMERCIAL

## 2019-12-11 VITALS
WEIGHT: 152 LBS | TEMPERATURE: 99 F | HEART RATE: 80 BPM | BODY MASS INDEX: 26.93 KG/M2 | DIASTOLIC BLOOD PRESSURE: 74 MMHG | SYSTOLIC BLOOD PRESSURE: 110 MMHG | HEIGHT: 63 IN

## 2019-12-11 DIAGNOSIS — Z00.00 ROUTINE GENERAL MEDICAL EXAMINATION AT A HEALTH CARE FACILITY: Primary | ICD-10-CM

## 2019-12-11 DIAGNOSIS — Z00.00 ROUTINE GENERAL MEDICAL EXAMINATION AT A HEALTH CARE FACILITY: ICD-10-CM

## 2019-12-11 PROCEDURE — 84443 ASSAY THYROID STIM HORMONE: CPT

## 2019-12-11 PROCEDURE — 99396 PREV VISIT EST AGE 40-64: CPT | Performed by: INTERNAL MEDICINE

## 2019-12-11 PROCEDURE — 80061 LIPID PANEL: CPT

## 2019-12-11 PROCEDURE — 87086 URINE CULTURE/COLONY COUNT: CPT

## 2019-12-11 PROCEDURE — 80053 COMPREHEN METABOLIC PANEL: CPT

## 2019-12-11 PROCEDURE — 36415 COLL VENOUS BLD VENIPUNCTURE: CPT

## 2019-12-11 PROCEDURE — 85027 COMPLETE CBC AUTOMATED: CPT

## 2019-12-11 PROCEDURE — 84439 ASSAY OF FREE THYROXINE: CPT

## 2019-12-11 PROCEDURE — 83036 HEMOGLOBIN GLYCOSYLATED A1C: CPT

## 2019-12-11 PROCEDURE — 81015 MICROSCOPIC EXAM OF URINE: CPT

## 2019-12-12 NOTE — PROGRESS NOTES
HPI:    Patient ID: Lord Zurita is a 53year old female.     HPI  Physical examination  Generally healthy  /74 (BP Location: Right arm, Patient Position: Sitting, Cuff Size: adult)   Pulse 80   Temp 98.6 °F (37 °C) (Oral)   Ht 5' 3\" (1.6 m) DIHYDROCHLORIDE 5 MG Oral Tab TAKE 1 TABLET(5 MG) BY MOUTH EVERY NIGHT 90 tablet 1   • Azelastine HCl 0.1 % Nasal Solution 2 sprays by Nasal route 2 (two) times daily.  1 Bottle 5   • Mometasone Furoate (NASONEX) 50 MCG/ACT Nasal Suspension 2 sprays by Chandu Lawson Never Smoker      Smokeless tobacco: Never Used      Tobacco comment: Grew up with a smoker, no present household smokers.      Alcohol use: No      Alcohol/week: 0.0 standard drinks      Comment: rarely    Drug use: No      Comment: none       PHYSICAL EXA advised      Orders Placed This Encounter      Assay, Thyroid Stim Hormone      Free T4, (Free Thyroxine)      Lipid Panel      CBC, Platelet;  No Differential      Comp Metabolic Panel (14)      Hemoglobin A1C      Urine Microscopic w Reflex CULTURE      M

## 2019-12-20 NOTE — TELEPHONE ENCOUNTER
May be best to rf x1 due to holiday. Will discuss Further plans at f/u.   Please fax form and send to scan

## 2019-12-20 NOTE — TELEPHONE ENCOUNTER
Request was just for a refill, not a PA, their electronic system was down. [address] please advise if refill approved and will fax form when signed. Last office visit 10/10/18.   Follow up scheduled 12/26

## 2019-12-24 RX ORDER — APREMILAST 30 MG/1
TABLET, FILM COATED ORAL
Qty: 60 TABLET | Refills: 0 | Status: SHIPPED | OUTPATIENT
Start: 2019-12-24 | End: 2020-03-10

## 2019-12-24 NOTE — TELEPHONE ENCOUNTER
Refill request for otezla, last RX approved by [facility] 12/19/19 did not reach pharmacy - RX sent x1 now

## 2019-12-26 ENCOUNTER — OFFICE VISIT (OUTPATIENT)
Dept: DERMATOLOGY CLINIC | Facility: CLINIC | Age: 44
End: 2019-12-26
Payer: COMMERCIAL

## 2019-12-26 DIAGNOSIS — D23.4 BENIGN NEOPLASM OF SCALP AND SKIN OF NECK: ICD-10-CM

## 2019-12-26 DIAGNOSIS — Z51.81 MEDICATION MONITORING ENCOUNTER: ICD-10-CM

## 2019-12-26 DIAGNOSIS — D48.5 NEOPLASM OF UNCERTAIN BEHAVIOR OF SKIN: Primary | ICD-10-CM

## 2019-12-26 DIAGNOSIS — L40.8 OTHER PSORIASIS: ICD-10-CM

## 2019-12-26 DIAGNOSIS — D23.30 BENIGN NEOPLASM OF SKIN OF FACE: ICD-10-CM

## 2019-12-26 DIAGNOSIS — L40.9 PSORIASIS: ICD-10-CM

## 2019-12-26 DIAGNOSIS — D23.5 BENIGN NEOPLASM OF SKIN OF TRUNK, EXCEPT SCROTUM: ICD-10-CM

## 2019-12-26 DIAGNOSIS — D23.60 BENIGN NEOPLASM OF SKIN OF UPPER LIMB, INCLUDING SHOULDER, UNSPECIFIED LATERALITY: ICD-10-CM

## 2019-12-26 PROCEDURE — 11102 TANGNTL BX SKIN SINGLE LES: CPT | Performed by: DERMATOLOGY

## 2019-12-26 PROCEDURE — 99213 OFFICE O/P EST LOW 20 MIN: CPT | Performed by: DERMATOLOGY

## 2019-12-26 PROCEDURE — 88305 TISSUE EXAM BY PATHOLOGIST: CPT | Performed by: DERMATOLOGY

## 2019-12-26 NOTE — PROGRESS NOTES
Path          Operative Report                     Shave/  Tangential biopsy     Clinical diagnosis:    Size of lesion:    Location:Diagnosis/Clinical History: pt with changing lesion on left neck  Spec 1 Description >>>>>: left posterior neck  Spec 1 Comm

## 2019-12-31 NOTE — PROGRESS NOTES
The pathology report from last visit showed  left posterior neck-Compound nevus  . Please log in test results, send biopsy results letter. Pt to rtc 1 year or prn.

## 2020-02-03 ENCOUNTER — OFFICE VISIT (OUTPATIENT)
Dept: INTERNAL MEDICINE CLINIC | Facility: CLINIC | Age: 45
End: 2020-02-03
Payer: COMMERCIAL

## 2020-02-03 VITALS
SYSTOLIC BLOOD PRESSURE: 114 MMHG | HEIGHT: 63 IN | WEIGHT: 153 LBS | HEART RATE: 100 BPM | OXYGEN SATURATION: 98 % | DIASTOLIC BLOOD PRESSURE: 76 MMHG | BODY MASS INDEX: 27.11 KG/M2 | TEMPERATURE: 101 F

## 2020-02-03 DIAGNOSIS — J11.1 FLU SYNDROME: Primary | ICD-10-CM

## 2020-02-03 LAB
FLUAV + FLUBV RNA SPEC NAA+PROBE: NEGATIVE
FLUAV + FLUBV RNA SPEC NAA+PROBE: NEGATIVE
FLUAV + FLUBV RNA SPEC NAA+PROBE: POSITIVE

## 2020-02-03 PROCEDURE — 99213 OFFICE O/P EST LOW 20 MIN: CPT | Performed by: INTERNAL MEDICINE

## 2020-02-03 RX ORDER — OSELTAMIVIR PHOSPHATE 75 MG/1
75 CAPSULE ORAL 2 TIMES DAILY
Qty: 10 CAPSULE | Refills: 0 | Status: SHIPPED | OUTPATIENT
Start: 2020-02-03 | End: 2020-05-02

## 2020-02-03 NOTE — PROGRESS NOTES
HPI:    Patient ID: Kenan Mata is a 53year old female.     HPI    Flu syndrome acute 2 days ago  Body aches fever chills   Cough clear phlegm    /76 (BP Location: Right arm, Patient Position: Sitting, Cuff Size: adult)   Pulse 100   Temp (!) Mometasone Furoate (NASONEX) 50 MCG/ACT Nasal Suspension 2 sprays by Nasal route daily.  1 Inhaler 5   • MELOXICAM 7.5 MG Oral Tab TAKE 1 TABLET(7.5 MG) BY MOUTH DAILY 30 tablet 3   • Clobetasol Propionate 0.05 % External Foam Use bid 100 g 6   • Fluocinolo Comment: rarely    Drug use: No      Comment: none       PHYSICAL EXAM:    Physical Exam   Constitutional: She appears well-developed. No distress. HENT:   Head: Normocephalic and atraumatic.    Right Ear: External ear normal.   Left Ear: External ear nor

## 2020-02-04 NOTE — PROGRESS NOTES
Spoke with patient (identified name and ), results reviewed and agrees with plan. Patient started tamiflu last night, advised to call back if symptoms become worse, such as SOB, dizziness, wheezing, chest pains.  Rest, push fluids, take otc pain relieve

## 2020-02-08 ENCOUNTER — NURSE ONLY (OUTPATIENT)
Dept: OBGYN CLINIC | Facility: CLINIC | Age: 45
End: 2020-02-08
Payer: COMMERCIAL

## 2020-02-08 VITALS
BODY MASS INDEX: 27 KG/M2 | WEIGHT: 150.81 LBS | SYSTOLIC BLOOD PRESSURE: 116 MMHG | DIASTOLIC BLOOD PRESSURE: 81 MMHG | HEART RATE: 69 BPM

## 2020-02-08 DIAGNOSIS — Z30.42 ENCOUNTER FOR DEPO-PROVERA CONTRACEPTION: Primary | ICD-10-CM

## 2020-02-08 DIAGNOSIS — Z30.42 ENCOUNTER FOR SURVEILLANCE OF INJECTABLE CONTRACEPTIVE: ICD-10-CM

## 2020-02-08 PROCEDURE — 96372 THER/PROPH/DIAG INJ SC/IM: CPT | Performed by: OBSTETRICS & GYNECOLOGY

## 2020-02-08 RX ORDER — MEDROXYPROGESTERONE ACETATE 150 MG/ML
150 INJECTION, SUSPENSION INTRAMUSCULAR ONCE
Status: COMPLETED | OUTPATIENT
Start: 2020-02-08 | End: 2020-02-08

## 2020-02-08 RX ADMIN — MEDROXYPROGESTERONE ACETATE 150 MG: 150 INJECTION, SUSPENSION INTRAMUSCULAR at 08:37:00

## 2020-02-19 RX ORDER — LOSARTAN POTASSIUM 50 MG/1
TABLET ORAL
Qty: 90 TABLET | Refills: 1 | Status: SHIPPED | OUTPATIENT
Start: 2020-02-19 | End: 2020-09-16

## 2020-02-19 RX ORDER — LEVOCETIRIZINE DIHYDROCHLORIDE 5 MG/1
TABLET, FILM COATED ORAL
Qty: 90 TABLET | Refills: 0 | Status: SHIPPED | OUTPATIENT
Start: 2020-02-19 | End: 2020-05-19

## 2020-02-19 NOTE — TELEPHONE ENCOUNTER
Refill requested for    Name from pharmacy: LEVOCETIRIZINE 5MG TABLETS         Will file in chart as: LEVOCETIRIZINE DIHYDROCHLORIDE 5 MG Oral Tab         Sig: TAKE 1 TABLET(5 MG) BY MOUTH EVERY NIGHT    Disp:  90 tablet    Refills:  1 (Pharmacy requested:

## 2020-02-19 NOTE — TELEPHONE ENCOUNTER
Hypertensive Medications. REFILLED PER PROTOCOL.     Protocol Criteria:  · Appointment scheduled in the past 6 months or in the next 3 months  · BMP or CMP in the past 12 months  · Creatinine result < 2  Recent Outpatient Visits            1 week ago Encoun

## 2020-03-10 RX ORDER — APREMILAST 30 MG/1
TABLET, FILM COATED ORAL
Qty: 60 TABLET | Refills: 9 | Status: SHIPPED | OUTPATIENT
Start: 2020-03-10 | End: 2021-01-15

## 2020-03-17 RX ORDER — MONTELUKAST SODIUM 10 MG/1
TABLET ORAL
Qty: 90 TABLET | Refills: 0 | Status: SHIPPED | OUTPATIENT
Start: 2020-03-17 | End: 2020-06-10

## 2020-03-17 NOTE — TELEPHONE ENCOUNTER
[employer] message sent to patient to notify her of RX refill and need for follow up appointment. RN provided call back number to schedule.

## 2020-03-17 NOTE — TELEPHONE ENCOUNTER
Pt last seen in Allergy 7/17/2019 for . . .    Chronic sinusitis, unspecified location  (primary encounter diagnosis)  Allergic rhinitis, unspecified seasonality, unspecified trigger     Refill request received for . . .     MONTELUKAST SODIUM 10 MG Oral Ta

## 2020-04-15 ENCOUNTER — TELEPHONE (OUTPATIENT)
Dept: INTERNAL MEDICINE CLINIC | Facility: CLINIC | Age: 45
End: 2020-04-15

## 2020-04-15 ENCOUNTER — NURSE TRIAGE (OUTPATIENT)
Dept: OTHER | Age: 45
End: 2020-04-15

## 2020-04-15 DIAGNOSIS — S01.25XA DOG BITE OF ALA NASI, INITIAL ENCOUNTER: Primary | ICD-10-CM
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
## 2020-04-15 RX ORDER — AMOXICILLIN AND CLAVULANATE POTASSIUM 875; 125 MG/1; MG/1
1 TABLET, FILM COATED ORAL 2 TIMES DAILY
Qty: 20 TABLET | Refills: 0 | Status: SHIPPED | OUTPATIENT
Start: 2020-04-15 | End: 2020-04-25

## 2020-04-15 NOTE — TELEPHONE ENCOUNTER
Action Requested: Summary for Provider     []  Critical Lab, Recommendations Needed  [x] Need Additional Advice  []   FYI    []   Need Orders  [] Need Medications Sent to Pharmacy  []  Other     SUMMARY: patient sent a my chart message (see below).  Her sis Blu Mora:     I got bit by a small dog on the hand and it benji blood. It is a small puncture wound that has stopped bleeding.   The dog up to date on shots, but do I need to do anything else?     Thanks,  stylemarks

## 2020-04-15 NOTE — TELEPHONE ENCOUNTER
----- Message from [place] Baldev Whitley sent at 4/15/2020  3:20 PM CDT -----  Regarding: Non-Urgent Medical Question  Contact: [phone]  Hi Dr. Tobi Long:    I got bit by a small dog on the hand and it benji blood.   It is a small puncture wound that has st

## 2020-04-16 NOTE — TELEPHONE ENCOUNTER
Virtual Telephone Check-In    Tanda Signs verbally consents to a Virtual/Telephone Check-In visit on 04/15/20. Patient understands and accepts financial responsibility for any deductible, co-insurance and/or co-pays associated with this service.

## 2020-04-20 RX ORDER — MOMETASONE 50 UG/1
2 SPRAY, METERED NASAL DAILY
Qty: 1 INHALER | Refills: 0 | Status: SHIPPED | OUTPATIENT
Start: 2020-04-20 | End: 2020-04-28

## 2020-04-20 RX ORDER — AZELASTINE 1 MG/ML
2 SPRAY, METERED NASAL 2 TIMES DAILY
Qty: 1 BOTTLE | Refills: 0 | Status: SHIPPED | OUTPATIENT
Start: 2020-04-20 | End: 2020-06-10

## 2020-04-20 NOTE — TELEPHONE ENCOUNTER
Refill requested for   Azelastine HCl 0.1 % Nasal Solution 1 Bottle 5 2018    Si sprays by Nasal route 2 (two) times daily.      Route:   Nasal       Mometasone Furoate (NASONEX) 50 MCG/ACT Nasal Suspension 1 Inhaler 5 2018    Si spray

## 2020-04-20 NOTE — TELEPHONE ENCOUNTER
Meds refilled x1 month.   Please call to schedule follow-up appointment within the next 1 to 2 weeks

## 2020-04-20 NOTE — TELEPHONE ENCOUNTER
RN called patient to notify her of RX refills and need for follow up appointment. Patient reports that she is interested in virtual visit but will call back once she knows her work schedule better.

## 2020-04-22 ENCOUNTER — TELEPHONE (OUTPATIENT)
Dept: OBGYN CLINIC | Facility: CLINIC | Age: 45
End: 2020-04-22

## 2020-04-22 NOTE — TELEPHONE ENCOUNTER
Called pt to r/s her Depo next week. Pt states she is on Depo for [facility] but it has controlled her horrible periods and without it she's had clots and very heavy bleeding.  She is willing to come off it but had HTN in the past and does not think OCPs are an opt

## 2020-04-28 ENCOUNTER — TELEPHONE (OUTPATIENT)
Dept: ALLERGY | Facility: CLINIC | Age: 45
End: 2020-04-28

## 2020-04-28 NOTE — TELEPHONE ENCOUNTER
Message reviewed and noted. Prescription for Qnasl 2 sprays per nostril once a day sent to patient's pharmacy x1 month. Patient last seen in early  July 2019 and due for follow-up within 1-2 months .   Per message Yuliet Rosales is considered step 1 therapy

## 2020-04-28 NOTE — TELEPHONE ENCOUNTER
Fax received from [facility] [address] \"You recently prescribed Mometasone Furoate for Vicki Cabrera that wasn't covered by her health insurance. This medication is part of a program called step therapy.  Step therapy requires a trial of step one medica

## 2020-04-28 NOTE — TELEPHONE ENCOUNTER
Spoke with patient, notified of change in nasal spray medication. She picked up the Flonase, was unable to get the Nasonex. Clarified with Dr. Rebeca Montelongo, and is to use the QNASL in place of the Azelastine/Nasonex combination.  She was able to  the Azel

## 2020-05-02 ENCOUNTER — NURSE ONLY (OUTPATIENT)
Dept: OBGYN CLINIC | Facility: CLINIC | Age: 45
End: 2020-05-02
Payer: COMMERCIAL

## 2020-05-02 VITALS
BODY MASS INDEX: 27 KG/M2 | SYSTOLIC BLOOD PRESSURE: 122 MMHG | HEART RATE: 63 BPM | WEIGHT: 153.81 LBS | DIASTOLIC BLOOD PRESSURE: 82 MMHG

## 2020-05-02 DIAGNOSIS — Z30.42 ENCOUNTER FOR SURVEILLANCE OF INJECTABLE CONTRACEPTIVE: Primary | ICD-10-CM

## 2020-05-02 PROCEDURE — 96372 THER/PROPH/DIAG INJ SC/IM: CPT | Performed by: OBSTETRICS & GYNECOLOGY

## 2020-05-02 RX ORDER — MEDROXYPROGESTERONE ACETATE 150 MG/ML
150 INJECTION, SUSPENSION INTRAMUSCULAR
Status: DISCONTINUED | OUTPATIENT
Start: 2020-05-02 | End: 2020-09-09 | Stop reason: ALTCHOICE

## 2020-05-02 RX ADMIN — MEDROXYPROGESTERONE ACETATE 150 MG: 150 INJECTION, SUSPENSION INTRAMUSCULAR at 08:34:00

## 2020-05-02 NOTE — PROGRESS NOTES
Patient here today for Depo Provera injection. Patient's last Annual was 3/15/19 with Zhou Ramon [id number]. Pt is aware she needs to schedule Annual but due to COVID-19, unable to schedule. Injection given on the Left, upper outer Gluteus side patient tolerated well.  Tressa

## 2020-05-19 RX ORDER — LEVOCETIRIZINE DIHYDROCHLORIDE 5 MG/1
TABLET, FILM COATED ORAL
Qty: 90 TABLET | Refills: 0 | Status: SHIPPED | OUTPATIENT
Start: 2020-05-19 | End: 2020-06-10

## 2020-06-01 ENCOUNTER — TELEPHONE (OUTPATIENT)
Dept: DERMATOLOGY CLINIC | Facility: CLINIC | Age: 45
End: 2020-06-01

## 2020-06-01 NOTE — TELEPHONE ENCOUNTER
Fax received requesting refill through cover my meds. Use key - [id number].     Current Outpatient Medications   Medication Sig Dispense Refill   • OTEZLA 30 MG Oral Tab TAKE 1 TABLET TWICE A DAY 60 tablet 9

## 2020-06-10 ENCOUNTER — OFFICE VISIT (OUTPATIENT)
Dept: ALLERGY | Facility: CLINIC | Age: 45
End: 2020-06-10
Payer: COMMERCIAL

## 2020-06-10 VITALS
OXYGEN SATURATION: 99 % | RESPIRATION RATE: 16 BRPM | TEMPERATURE: 98 F | DIASTOLIC BLOOD PRESSURE: 79 MMHG | HEART RATE: 73 BPM | SYSTOLIC BLOOD PRESSURE: 110 MMHG

## 2020-06-10 DIAGNOSIS — J30.9 ALLERGIC RHINITIS, UNSPECIFIED SEASONALITY, UNSPECIFIED TRIGGER: ICD-10-CM

## 2020-06-10 DIAGNOSIS — J32.9 CHRONIC SINUSITIS, UNSPECIFIED LOCATION: Primary | ICD-10-CM

## 2020-06-10 DIAGNOSIS — J30.89 ENVIRONMENTAL AND SEASONAL ALLERGIES: ICD-10-CM

## 2020-06-10 PROCEDURE — 99214 OFFICE O/P EST MOD 30 MIN: CPT | Performed by: ALLERGY & IMMUNOLOGY

## 2020-06-10 RX ORDER — MONTELUKAST SODIUM 10 MG/1
10 TABLET ORAL NIGHTLY
Qty: 90 TABLET | Refills: 1 | Status: SHIPPED | OUTPATIENT
Start: 2020-06-10 | End: 2020-09-16

## 2020-06-10 RX ORDER — AZELASTINE 1 MG/ML
2 SPRAY, METERED NASAL 2 TIMES DAILY
Qty: 3 BOTTLE | Refills: 1 | Status: SHIPPED | OUTPATIENT
Start: 2020-06-10 | End: 2020-09-16

## 2020-06-10 NOTE — PATIENT INSTRUCTIONS
1. Ar  Some recent nasal congestion runny nose and postnasal drip over the preceding 2 weeks.   No fevers or mucopurulence  Patient defers considering immunotherapy at this time  Continue with Qnasl 2 sprays per nostril once a day, Claritin 1-2 times per [employer]

## 2020-06-10 NOTE — PROGRESS NOTES
Vilma Mac is a 53year old female. HPI:   Patient presents with:  Sinus Problem: six weeks ago pt had a bad flare. Pt reports her allergies are bothering her. PND, throat irritation, itchy eyes, occasional sneezing, some ear congestion.      Eva White TURBINATE,SUBMUCOUS  6/23/2016   • MOUTH SURGERY PROC UNLISTED  12/5/2011    Jaw surgery/metal implant   • REMOVAL OF TONSILS,12+ Y/O  6/23/2016   • REPAIR OF NASAL SEPTUM  6/23/2016      Family History   Problem Relation Age of Onset   • Cancer Father Apply  topically.  apply by TOPICAL route 2 times every day a thin film to the affected skin areasand rub in gently and completely         Allergies:  No Known Allergies      ROS:   Allergic/Immuno:  See hpi  Cardiovascular:  Negative for irregular heartbea 1 to 2 sprays per nostril twice a day either as needed or on a regular basis if nasal congestion is prominent  Start trial of sinus rinses  Reviewed allergy avoidance measures    2. CS  Denies recurrent antibiotics or prednisone in the interim.   Continue w

## 2020-06-15 ENCOUNTER — TELEPHONE (OUTPATIENT)
Dept: ALLERGY | Facility: CLINIC | Age: 45
End: 2020-06-15

## 2020-06-15 RX ORDER — MONTELUKAST SODIUM 10 MG/1
TABLET ORAL
Qty: 90 TABLET | Refills: 1 | OUTPATIENT
Start: 2020-06-15

## 2020-06-15 NOTE — TELEPHONE ENCOUNTER
Qnasl rx faxed to [facility] at [phone] with verification received of successful transmission. Qnasl 2 sprays each nostril once daily  Dispense of #3 inhalers with 1 refill.

## 2020-06-15 NOTE — TELEPHONE ENCOUNTER
Dr. Sparkle Lilly, please Deny Rx as rx was sent to [facility] on 6/10/2020    Pt last seen in Allergy 6/10/2020 for .  . .    Chronic sinusitis, unspecified location  (primary encounter diagnosis)  Allergic rhinitis, unspecified seasonality, unspecified irina

## 2020-06-15 NOTE — TELEPHONE ENCOUNTER
•  Beclomethasone Dipropionate (QNASL) 80 MCG/ACT Nasal Aero Soln, 2 Squirts by Nasal route daily. , Disp: 1 Inhaler, Rfl: 0

## 2020-06-18 ENCOUNTER — MED REC SCAN ONLY (OUTPATIENT)
Dept: DERMATOLOGY CLINIC | Facility: CLINIC | Age: 45
End: 2020-06-18

## 2020-07-25 ENCOUNTER — OFFICE VISIT (OUTPATIENT)
Dept: OBGYN CLINIC | Facility: CLINIC | Age: 45
End: 2020-07-25
Payer: COMMERCIAL

## 2020-07-25 VITALS
HEART RATE: 58 BPM | WEIGHT: 151 LBS | BODY MASS INDEX: 27 KG/M2 | SYSTOLIC BLOOD PRESSURE: 133 MMHG | DIASTOLIC BLOOD PRESSURE: 84 MMHG

## 2020-07-25 DIAGNOSIS — Z12.31 ENCOUNTER FOR SCREENING MAMMOGRAM FOR BREAST CANCER: ICD-10-CM

## 2020-07-25 DIAGNOSIS — Z30.42 ENCOUNTER FOR MANAGEMENT AND INJECTION OF DEPO-PROVERA: ICD-10-CM

## 2020-07-25 DIAGNOSIS — Z01.419 ENCOUNTER FOR GYNECOLOGICAL EXAMINATION: Primary | ICD-10-CM

## 2020-07-25 PROCEDURE — 3075F SYST BP GE 130 - 139MM HG: CPT | Performed by: OBSTETRICS & GYNECOLOGY

## 2020-07-25 PROCEDURE — 96372 THER/PROPH/DIAG INJ SC/IM: CPT | Performed by: OBSTETRICS & GYNECOLOGY

## 2020-07-25 PROCEDURE — 99396 PREV VISIT EST AGE 40-64: CPT | Performed by: OBSTETRICS & GYNECOLOGY

## 2020-07-25 PROCEDURE — 3079F DIAST BP 80-89 MM HG: CPT | Performed by: OBSTETRICS & GYNECOLOGY

## 2020-07-25 RX ORDER — MEDROXYPROGESTERONE ACETATE 150 MG/ML
150 INJECTION, SUSPENSION INTRAMUSCULAR
Status: SHIPPED | OUTPATIENT
Start: 2020-07-25 | End: 2021-07-20

## 2020-07-25 RX ADMIN — MEDROXYPROGESTERONE ACETATE 150 MG: 150 INJECTION, SUSPENSION INTRAMUSCULAR at 10:57:00

## 2020-07-25 NOTE — PROGRESS NOTES
Patient here today for annual with Zahra Lee. Depo Provera injection. Patient's last Annual was 3/15/19 with JLK Injection given on the Right, upper outer Gluteus side patient tolerated well.  Patient given written return instruction for October 10-24, 2020  for

## 2020-07-25 NOTE — PROGRESS NOTES
Austin Larson is a 53year old female  No LMP recorded. Patient has had an injection. here for annual exam.       Last seen 3/15/19. Last pap 2018 normal with neg HPV. Had to stop ocp due to HTN. Doing well with Depo provera.   No period by Nasal route daily. 3 Inhaler 3   • Montelukast Sodium 10 MG Oral Tab Take 1 tablet (10 mg total) by mouth nightly. 90 tablet 1   • Azelastine HCl 0.1 % Nasal Solution 2 sprays by Nasal route 2 (two) times daily.  3 Bottle 1   • OTEZLA 30 MG Oral Tab TAKE Body mass index is 26.75 kg/m².     Constitutional: well developed, well nourished  Neck/Thyroid: thyroid symmetric, no nodules  Heart:  Regular rate and rhythm  Lungs:  Clear to asculation  Breast: normal without palpable masses, tenderness, asymmetry, nip

## 2020-08-08 ENCOUNTER — HOSPITAL ENCOUNTER (OUTPATIENT)
Dept: MAMMOGRAPHY | Age: 45
Discharge: HOME | End: 2020-08-08
Attending: OBSTETRICS & GYNECOLOGY
Payer: COMMERCIAL

## 2020-08-08 DIAGNOSIS — Z12.31 ENCOUNTER FOR SCREENING MAMMOGRAM FOR BREAST CANCER: ICD-10-CM

## 2020-08-08 PROCEDURE — 77063 BREAST TOMOSYNTHESIS BI: CPT | Performed by: OBSTETRICS & GYNECOLOGY

## 2020-08-08 PROCEDURE — 77067 SCR MAMMO BI INCL CAD: CPT | Performed by: OBSTETRICS & GYNECOLOGY

## 2020-08-12 ENCOUNTER — TELEPHONE (OUTPATIENT)
Dept: OBGYN CLINIC | Facility: CLINIC | Age: 45
End: 2020-08-12

## 2020-08-12 ENCOUNTER — HOSPITAL ENCOUNTER (OUTPATIENT)
Dept: ULTRASOUND IMAGING | Facility: HOSPITAL | Age: 45
Discharge: HOME | End: 2020-08-12
Attending: OBSTETRICS & GYNECOLOGY
Payer: COMMERCIAL

## 2020-08-12 ENCOUNTER — HOSPITAL ENCOUNTER (OUTPATIENT)
Dept: MAMMOGRAPHY | Facility: HOSPITAL | Age: 45
Discharge: HOME | End: 2020-08-12
Attending: OBSTETRICS & GYNECOLOGY
Payer: COMMERCIAL

## 2020-08-12 DIAGNOSIS — R92.8 ABNORMAL MAMMOGRAM: ICD-10-CM

## 2020-08-12 PROCEDURE — 77061 BREAST TOMOSYNTHESIS UNI: CPT | Performed by: OBSTETRICS & GYNECOLOGY

## 2020-08-12 PROCEDURE — 76642 ULTRASOUND BREAST LIMITED: CPT | Performed by: OBSTETRICS & GYNECOLOGY

## 2020-08-12 PROCEDURE — 77065 DX MAMMO INCL CAD UNI: CPT | Performed by: OBSTETRICS & GYNECOLOGY

## 2020-08-12 NOTE — IMAGING NOTE
This nurse introduced self and role of breast coordinator. Discussed recommended breast biopsy with patient. Pt was recommended by dr Zhane Cochran  to have a left  breast ultrasound guided biopsy. She is  has no children . She works as a .  Opal Paz nodes, demonstrating fatty hilum and normal cortical thickness. CONCLUSION:  Ultrasound-guided biopsy is recommended for the hypoechoic mass at 12:00 o'clock, 5 centimeters from the nipple, measuring 0.4 x 0.6 x 0.6 centimeters.      BI-RADS CATEG medications, as well as over-the-counter vitamin or herbal supplements, as follows:   -All herbal supplements, Vitamin E, Fish Oil  green tea ,all nsaids like   ibuprofen, Motrin, Advil, Aleve, or other anti-inflammatory medication)   and aspirin 81 mg to soreness  may occur after biopsy. Discussed use of a supportive bra and ice packs after procedure, to decrease soreness. Tylenol only for discomfort unless they have an allergy to tylenol .   Discussed with patient no swimming, bathing,  hot tubs or subme

## 2020-08-12 NOTE — TELEPHONE ENCOUNTER
[facility]OULD [facility]END ORDERS FOR US TO COMPLETE FOR A BREAST BIOPSY. THIS IS A SUSPICIOUS FINDING. ORDER RECEIVED, COMPLETED AND FAXED BACK.

## 2020-08-14 ENCOUNTER — HOSPITAL ENCOUNTER (OUTPATIENT)
Dept: MAMMOGRAPHY | Facility: HOSPITAL | Age: 45
Discharge: HOME | End: 2020-08-14
Attending: OBSTETRICS & GYNECOLOGY
Payer: COMMERCIAL

## 2020-08-14 ENCOUNTER — HOSPITAL ENCOUNTER (OUTPATIENT)
Dept: ULTRASOUND IMAGING | Facility: HOSPITAL | Age: 45
Discharge: HOME | End: 2020-08-14
Attending: OBSTETRICS & GYNECOLOGY
Payer: COMMERCIAL

## 2020-08-14 VITALS — DIASTOLIC BLOOD PRESSURE: 71 MMHG | SYSTOLIC BLOOD PRESSURE: 122 MMHG | HEART RATE: 75 BPM

## 2020-08-14 DIAGNOSIS — C50.912 DUCTAL CARCINOMA OF LEFT BREAST (HCC): Primary | ICD-10-CM

## 2020-08-14 DIAGNOSIS — N63.20 BREAST MASS, LEFT: ICD-10-CM

## 2020-08-14 PROCEDURE — 88305 TISSUE EXAM BY PATHOLOGIST: CPT | Performed by: OBSTETRICS & GYNECOLOGY

## 2020-08-14 PROCEDURE — 88360 TUMOR IMMUNOHISTOCHEM/MANUAL: CPT | Performed by: OBSTETRICS & GYNECOLOGY

## 2020-08-14 PROCEDURE — 77065 DX MAMMO INCL CAD UNI: CPT | Performed by: OBSTETRICS & GYNECOLOGY

## 2020-08-14 PROCEDURE — 88377 M/PHMTRC ALYS ISHQUANT/SEMIQ: CPT | Performed by: PATHOLOGY

## 2020-08-14 PROCEDURE — 19083 BX BREAST 1ST LESION US IMAG: CPT | Performed by: OBSTETRICS & GYNECOLOGY

## 2020-08-14 PROCEDURE — 36415 COLL VENOUS BLD VENIPUNCTURE: CPT | Performed by: PATHOLOGY

## 2020-08-14 PROCEDURE — 88363 XM ARCHIVE TISSUE MOLEC ANAL: CPT | Performed by: OBSTETRICS & GYNECOLOGY

## 2020-08-14 NOTE — PROCEDURES
[facility]  Procedure Note    Lidia Dustyin Patient Status:  Outpatient    10/14/1975 MRN [id number]   Location [address] Attending Nohemy Ayala MD   [facility] Day # 0 PCP Valentino Duckworth MD     Proc

## 2020-08-17 ENCOUNTER — TELEPHONE (OUTPATIENT)
Dept: HEMATOLOGY/ONCOLOGY | Facility: HOSPITAL | Age: 45
End: 2020-08-17

## 2020-08-17 NOTE — TELEPHONE ENCOUNTER
Patient is s/p left breast biopsy, performed 8/14/20, at [facility]. She has been referred to the Breast Biopsy Result Clinic for results reporting with Radiology and RN Navigator. Phoned patient and phone conference scheduled for 1pm today.   Instructed meron

## 2020-08-17 NOTE — TELEPHONE ENCOUNTER
Phoned patient for scheduled phone consultation with Dr. Ric Christian. Patient notified of the malignant pathology results from her left breast biopsy. Questions answered by Dr. Ric Christian. Introduced myself to patient as Breast RN Navigator.   Shared

## 2020-08-20 ENCOUNTER — OFFICE VISIT (OUTPATIENT)
Dept: SURGERY | Facility: CLINIC | Age: 45
End: 2020-08-20
Payer: COMMERCIAL

## 2020-08-20 ENCOUNTER — LAB ENCOUNTER (OUTPATIENT)
Dept: LAB | Facility: HOSPITAL | Age: 45
End: 2020-08-20
Attending: SURGERY
Payer: COMMERCIAL

## 2020-08-20 ENCOUNTER — TELEPHONE (OUTPATIENT)
Dept: SURGERY | Facility: CLINIC | Age: 45
End: 2020-08-20

## 2020-08-20 VITALS — BODY MASS INDEX: 26.75 KG/M2 | HEIGHT: 63 IN | WEIGHT: 151 LBS

## 2020-08-20 DIAGNOSIS — C50.912 MALIGNANT NEOPLASM OF LEFT FEMALE BREAST, UNSPECIFIED ESTROGEN RECEPTOR STATUS, UNSPECIFIED SITE OF BREAST (HCC): ICD-10-CM

## 2020-08-20 DIAGNOSIS — C50.912 MALIGNANT NEOPLASM OF LEFT FEMALE BREAST, UNSPECIFIED ESTROGEN RECEPTOR STATUS, UNSPECIFIED SITE OF BREAST (HCC): Primary | ICD-10-CM

## 2020-08-20 LAB
ALBUMIN SERPL-MCNC: 3.9 G/DL (ref 3.4–5)
ALBUMIN/GLOB SERPL: 1 {RATIO} (ref 1–2)
ALP LIVER SERPL-CCNC: 55 U/L (ref 39–100)
ALT SERPL-CCNC: 12 U/L (ref 13–56)
ANION GAP SERPL CALC-SCNC: 6 MMOL/L (ref 0–18)
AST SERPL-CCNC: 9 U/L (ref 15–37)
BASOPHILS # BLD AUTO: 0.04 X10(3) UL (ref 0–0.2)
BASOPHILS NFR BLD AUTO: 0.6 %
BILIRUB SERPL-MCNC: 0.5 MG/DL (ref 0.1–2)
BUN BLD-MCNC: 7 MG/DL (ref 7–18)
BUN/CREAT SERPL: 8.3 (ref 10–20)
CALCIUM BLD-MCNC: 9.5 MG/DL (ref 8.5–10.1)
CHLORIDE SERPL-SCNC: 110 MMOL/L (ref 98–112)
CO2 SERPL-SCNC: 25 MMOL/L (ref 21–32)
CREAT BLD-MCNC: 0.84 MG/DL (ref 0.55–1.02)
DEPRECATED RDW RBC AUTO: 45.9 FL (ref 35.1–46.3)
EOSINOPHIL # BLD AUTO: 0.09 X10(3) UL (ref 0–0.7)
EOSINOPHIL NFR BLD AUTO: 1.3 %
ERYTHROCYTE [DISTWIDTH] IN BLOOD BY AUTOMATED COUNT: 14 % (ref 11–15)
GLOBULIN PLAS-MCNC: 3.9 G/DL (ref 2.8–4.4)
GLUCOSE BLD-MCNC: 79 MG/DL (ref 70–99)
HCT VFR BLD AUTO: 39.5 % (ref 35–48)
HGB BLD-MCNC: 12.9 G/DL (ref 12–16)
IMM GRANULOCYTES # BLD AUTO: 0.03 X10(3) UL (ref 0–1)
IMM GRANULOCYTES NFR BLD: 0.4 %
LYMPHOCYTES # BLD AUTO: 2.42 X10(3) UL (ref 1–4)
LYMPHOCYTES NFR BLD AUTO: 34.8 %
M PROTEIN MFR SERPL ELPH: 7.8 G/DL (ref 6.4–8.2)
MCH RBC QN AUTO: 29.3 PG (ref 26–34)
MCHC RBC AUTO-ENTMCNC: 32.7 G/DL (ref 31–37)
MCV RBC AUTO: 89.8 FL (ref 80–100)
MONOCYTES # BLD AUTO: 0.53 X10(3) UL (ref 0.1–1)
MONOCYTES NFR BLD AUTO: 7.6 %
NEUTROPHILS # BLD AUTO: 3.84 X10 (3) UL (ref 1.5–7.7)
NEUTROPHILS # BLD AUTO: 3.84 X10(3) UL (ref 1.5–7.7)
NEUTROPHILS NFR BLD AUTO: 55.3 %
OSMOLALITY SERPL CALC.SUM OF ELEC: 289 MOSM/KG (ref 275–295)
PATIENT FASTING Y/N/NP: NO
PLATELET # BLD AUTO: 314 10(3)UL (ref 150–450)
POTASSIUM SERPL-SCNC: 4.1 MMOL/L (ref 3.5–5.1)
RBC # BLD AUTO: 4.4 X10(6)UL (ref 3.8–5.3)
SODIUM SERPL-SCNC: 141 MMOL/L (ref 136–145)
WBC # BLD AUTO: 7 X10(3) UL (ref 4–11)

## 2020-08-20 PROCEDURE — 80053 COMPREHEN METABOLIC PANEL: CPT

## 2020-08-20 PROCEDURE — 3008F BODY MASS INDEX DOCD: CPT | Performed by: SURGERY

## 2020-08-20 PROCEDURE — 85025 COMPLETE CBC W/AUTO DIFF WBC: CPT

## 2020-08-20 PROCEDURE — 82652 VIT D 1 25-DIHYDROXY: CPT

## 2020-08-20 PROCEDURE — 36415 COLL VENOUS BLD VENIPUNCTURE: CPT

## 2020-08-20 PROCEDURE — 99205 OFFICE O/P NEW HI 60 MIN: CPT | Performed by: SURGERY

## 2020-08-20 NOTE — H&P
Chief complaint: Patient presents with:  Breast Cancer: Referred by breast navigator, Anabella Echevarria for Left breast cancer. HPI: Elroy Ennis presents for consultation related to newly dxd breast ca.      No mass palp, finding on routine mammogram only  0.6 cm findi triamcinolone acetonide 0.1 % External Cream Apply topically 2 (two) times daily.  apply by TOPICAL route 2 times every day a thin film to the affected skin areas 80 g 3   • Emollient (ELETONE) Apply Externally Cream Use bid 200 g 6   • calcipotriene (Maurene Coyer complaints  NEURO: no persistent, recurrent  headaches  PSYCHE:no depression or anxiety  HEMATOLOGIC: no hx of blood dyscrasia  ENDOCRINE: no new endocrine problems  ALL/ASTHMA: no new hx of severe allergy or asthma  BACK: normal, no spinal deformity, no C the risks and benefits of surgery and discussed alternatives as well as the expected recovery. Today I have ordered preoperative testing.     Will order MRI, blood work incl Vit D level  RTC after MRI     Gina Jacob MD  8/20/2020  2:52 PM

## 2020-08-20 NOTE — TELEPHONE ENCOUNTER
Pt dropped off FMLA papers to be completed and signed.  YENI was filled ou $25 was paid  Sent to Forms and placd in YENI @ [facility]

## 2020-08-21 ENCOUNTER — TELEPHONE (OUTPATIENT)
Dept: HEMATOLOGY/ONCOLOGY | Facility: HOSPITAL | Age: 45
End: 2020-08-21

## 2020-08-21 ENCOUNTER — TELEPHONE (OUTPATIENT)
Dept: SURGERY | Facility: CLINIC | Age: 45
End: 2020-08-21

## 2020-08-21 NOTE — TELEPHONE ENCOUNTER
Phoned patient for continued care coordination. Patient had consultation with Dr. Otilia Barfield and has been referred for breast MRI. Educated patient as to this exam and what to expect.   Exam scheduled for Tuesday 8/25/20 at 6pm.  All appointment information pr

## 2020-08-21 NOTE — TELEPHONE ENCOUNTER
Per [facility] [id number] approved from 8/21/2020-2/17/2020, ref # [id number], approval # [id number]

## 2020-08-23 LAB — 1,25-DIHYDROXYVITAMIN D: 37.8 PG/ML

## 2020-08-24 NOTE — TELEPHONE ENCOUNTER
Patient will need intermittent and continuous FMLA. Date she already missed due to appts. and testing are listed on HIPAA (8/12/20, 8/14/20, 8/20/20) She is waiting to get MRI, then will find out surgery date. Hold off on form until after her appt.  on [facility]

## 2020-08-25 ENCOUNTER — HOSPITAL ENCOUNTER (OUTPATIENT)
Dept: MRI IMAGING | Facility: HOSPITAL | Age: 45
Discharge: HOME | End: 2020-08-25
Attending: SURGERY
Payer: COMMERCIAL

## 2020-08-25 DIAGNOSIS — C50.912 MALIGNANT NEOPLASM OF LEFT FEMALE BREAST, UNSPECIFIED ESTROGEN RECEPTOR STATUS, UNSPECIFIED SITE OF BREAST (HCC): ICD-10-CM

## 2020-08-25 PROCEDURE — 77049 MRI BREAST C-+ W/CAD BI: CPT | Performed by: SURGERY

## 2020-08-25 PROCEDURE — A9575 INJ GADOTERATE MEGLUMI 0.1ML: HCPCS | Performed by: SURGERY

## 2020-08-28 ENCOUNTER — OFFICE VISIT (OUTPATIENT)
Dept: SURGERY | Facility: CLINIC | Age: 45
End: 2020-08-28
Payer: COMMERCIAL

## 2020-08-28 VITALS — BODY MASS INDEX: 26.75 KG/M2 | WEIGHT: 151 LBS | HEIGHT: 63 IN

## 2020-08-28 DIAGNOSIS — C50.912 MALIGNANT NEOPLASM OF LEFT FEMALE BREAST, UNSPECIFIED ESTROGEN RECEPTOR STATUS, UNSPECIFIED SITE OF BREAST (HCC): Primary | ICD-10-CM

## 2020-08-28 PROCEDURE — 99214 OFFICE O/P EST MOD 30 MIN: CPT | Performed by: SURGERY

## 2020-08-28 PROCEDURE — 3008F BODY MASS INDEX DOCD: CPT | Performed by: SURGERY

## 2020-08-28 NOTE — H&P
Chief complaint: Patient presents with:  Breast Cancer: Follow up MRI for L BR CA      HPI: Eliezer Jensen presents for consultation related to newly dxd breast ca. Today we reviewed her MRI and mammograms, reports and images.     MRI found no other suspicious a on 8/20/2020) 30 tablet 3   • Clobetasol Propionate 0.05 % External Foam Use bid 100 g 6   • Fluocinolone Acetonide (DERMOTIC) 0.01 % Otic Oil Use qhs to ears for psoriasis 20 mL 6   • triamcinolone acetonide 0.1 % External Cream Apply topically 2 (two) ti urinating  MUSCULOSKELETAL: no new musculoskeletal complaints  NEURO: no persistent, recurrent  headaches  PSYCHE:no depression or anxiety  HEMATOLOGIC: no hx of blood dyscrasia  ENDOCRINE: no new endocrine problems  ALL/ASTHMA: no new hx of severe allergy sentinel node, adjuvant radiation options, chemotherapy possibilities, genetic evaluation, endocrine treatment. We discussed the risks and benefits of surgery and discussed alternatives as well as the expected recovery.   Today I have ordered preoperative

## 2020-08-28 NOTE — H&P (VIEW-ONLY)
Chief complaint: Patient presents with:  Breast Cancer: Follow up MRI for L BR CA      HPI: Rebecca Bradford presents for consultation related to newly dxd breast ca. Today we reviewed her MRI and mammograms, reports and images.     MRI found no other suspicious a on 8/20/2020) 30 tablet 3   • Clobetasol Propionate 0.05 % External Foam Use bid 100 g 6   • Fluocinolone Acetonide (DERMOTIC) 0.01 % Otic Oil Use qhs to ears for psoriasis 20 mL 6   • triamcinolone acetonide 0.1 % External Cream Apply topically 2 (two) ti urinating  MUSCULOSKELETAL: no new musculoskeletal complaints  NEURO: no persistent, recurrent  headaches  PSYCHE:no depression or anxiety  HEMATOLOGIC: no hx of blood dyscrasia  ENDOCRINE: no new endocrine problems  ALL/ASTHMA: no new hx of severe allergy sentinel node, adjuvant radiation options, chemotherapy possibilities, genetic evaluation, endocrine treatment. We discussed the risks and benefits of surgery and discussed alternatives as well as the expected recovery.   Today I have ordered preoperative

## 2020-09-01 NOTE — TELEPHONE ENCOUNTER
Dr. Rigoberto Leventhal,     Please sign off on form: [facility] 9/11/2020-1-2wks post op  -Highlight the patient and hit \"Chart\" button.   -In Chart Review, w/in the Encounter tab - click 1 time on the Telephone call encounter for 8/20/2020 Scroll down the telephone encount

## 2020-09-02 DIAGNOSIS — C50.912 INFILTRATING DUCTAL CARCINOMA OF LEFT BREAST (HCC): Primary | ICD-10-CM

## 2020-09-08 ENCOUNTER — APPOINTMENT (OUTPATIENT)
Dept: LAB | Facility: HOSPITAL | Age: 45
End: 2020-09-08
Attending: SURGERY
Payer: COMMERCIAL

## 2020-09-08 DIAGNOSIS — Z01.818 PREOP TESTING: ICD-10-CM

## 2020-09-10 LAB — SARS-COV-2 RNA RESP QL NAA+PROBE: NOT DETECTED

## 2020-09-11 ENCOUNTER — APPOINTMENT (OUTPATIENT)
Dept: MAMMOGRAPHY | Facility: HOSPITAL | Age: 45
End: 2020-09-11
Attending: SURGERY
Payer: COMMERCIAL

## 2020-09-11 ENCOUNTER — HOSPITAL ENCOUNTER (OUTPATIENT)
Dept: NUCLEAR MEDICINE | Facility: HOSPITAL | Age: 45
End: 2020-09-11
Attending: SURGERY
Payer: COMMERCIAL

## 2020-09-12 ENCOUNTER — APPOINTMENT (OUTPATIENT)
Dept: LAB | Facility: HOSPITAL | Age: 45
End: 2020-09-12
Attending: SURGERY
Payer: COMMERCIAL

## 2020-09-12 DIAGNOSIS — Z01.818 PREOP TESTING: ICD-10-CM

## 2020-09-12 LAB — SARS-COV-2 RNA RESP QL NAA+PROBE: NOT DETECTED

## 2020-09-14 ENCOUNTER — ANESTHESIA EVENT (OUTPATIENT)
Dept: SURGERY | Facility: HOSPITAL | Age: 45
End: 2020-09-14
Payer: COMMERCIAL

## 2020-09-14 ENCOUNTER — HOSPITAL ENCOUNTER (OUTPATIENT)
Dept: ULTRASOUND IMAGING | Facility: HOSPITAL | Age: 45
Discharge: HOME | End: 2020-09-14
Attending: SURGERY
Payer: COMMERCIAL

## 2020-09-14 ENCOUNTER — HOSPITAL ENCOUNTER (OUTPATIENT)
Dept: MAMMOGRAPHY | Facility: HOSPITAL | Age: 45
End: 2020-09-14
Attending: SURGERY
Payer: COMMERCIAL

## 2020-09-14 ENCOUNTER — ANESTHESIA (OUTPATIENT)
Dept: SURGERY | Facility: HOSPITAL | Age: 45
End: 2020-09-14
Payer: COMMERCIAL

## 2020-09-14 ENCOUNTER — HOSPITAL ENCOUNTER (OUTPATIENT)
Dept: NUCLEAR MEDICINE | Facility: HOSPITAL | Age: 45
Discharge: HOME | End: 2020-09-14
Attending: SURGERY
Payer: COMMERCIAL

## 2020-09-14 ENCOUNTER — HOSPITAL ENCOUNTER (OUTPATIENT)
Facility: HOSPITAL | Age: 45
Setting detail: HOSPITAL OUTPATIENT SURGERY
Discharge: HOME | End: 2020-09-14
Attending: SURGERY | Admitting: SURGERY
Payer: COMMERCIAL

## 2020-09-14 VITALS
HEART RATE: 80 BPM | SYSTOLIC BLOOD PRESSURE: 135 MMHG | RESPIRATION RATE: 18 BRPM | TEMPERATURE: 98 F | OXYGEN SATURATION: 97 % | WEIGHT: 151 LBS | DIASTOLIC BLOOD PRESSURE: 86 MMHG | BODY MASS INDEX: 27 KG/M2

## 2020-09-14 VITALS — HEART RATE: 56 BPM | OXYGEN SATURATION: 99 % | DIASTOLIC BLOOD PRESSURE: 90 MMHG | SYSTOLIC BLOOD PRESSURE: 150 MMHG

## 2020-09-14 DIAGNOSIS — C50.919 BREAST CA (HCC): ICD-10-CM

## 2020-09-14 DIAGNOSIS — C50.919 CANCER OF BREAST (HCC): ICD-10-CM

## 2020-09-14 DIAGNOSIS — Z01.818 PREOP TESTING: Primary | ICD-10-CM

## 2020-09-14 DIAGNOSIS — C50.912 MALIGNANT NEOPLASM OF LEFT FEMALE BREAST, UNSPECIFIED ESTROGEN RECEPTOR STATUS, UNSPECIFIED SITE OF BREAST (HCC): ICD-10-CM

## 2020-09-14 LAB — B-HCG UR QL: NEGATIVE

## 2020-09-14 PROCEDURE — 07B60ZX EXCISION OF LEFT AXILLARY LYMPHATIC, OPEN APPROACH, DIAGNOSTIC: ICD-10-PCS | Performed by: SURGERY

## 2020-09-14 PROCEDURE — 76098 X-RAY EXAM SURGICAL SPECIMEN: CPT | Performed by: SURGERY

## 2020-09-14 PROCEDURE — 38525 BIOPSY/REMOVAL LYMPH NODES: CPT | Performed by: SURGERY

## 2020-09-14 PROCEDURE — 0HBU0ZZ EXCISION OF LEFT BREAST, OPEN APPROACH: ICD-10-PCS | Performed by: SURGERY

## 2020-09-14 PROCEDURE — 38792 RA TRACER ID OF SENTINL NODE: CPT | Performed by: SURGERY

## 2020-09-14 PROCEDURE — C71L1ZZ PLANAR NUCLEAR MEDICINE IMAGING OF UPPER CHEST LYMPHATICS USING TECHNETIUM 99M (TC-99M): ICD-10-PCS | Performed by: SURGERY

## 2020-09-14 PROCEDURE — 77065 DX MAMMO INCL CAD UNI: CPT | Performed by: SURGERY

## 2020-09-14 PROCEDURE — 19301 PARTIAL MASTECTOMY: CPT | Performed by: SURGERY

## 2020-09-14 PROCEDURE — 19285 PERQ DEV BREAST 1ST US IMAG: CPT | Performed by: SURGERY

## 2020-09-14 PROCEDURE — 3E0W3KZ INTRODUCTION OF OTHER DIAGNOSTIC SUBSTANCE INTO LYMPHATICS, PERCUTANEOUS APPROACH: ICD-10-PCS | Performed by: SURGERY

## 2020-09-14 PROCEDURE — 78195 LYMPH SYSTEM IMAGING: CPT | Performed by: SURGERY

## 2020-09-14 RX ORDER — PROCHLORPERAZINE EDISYLATE 5 MG/ML
5 INJECTION INTRAMUSCULAR; INTRAVENOUS ONCE AS NEEDED
Status: DISCONTINUED | OUTPATIENT
Start: 2020-09-14 | End: 2020-09-14

## 2020-09-14 RX ORDER — MORPHINE SULFATE 4 MG/ML
4 INJECTION, SOLUTION INTRAMUSCULAR; INTRAVENOUS EVERY 2 HOUR PRN
Status: CANCELLED | OUTPATIENT
Start: 2020-09-14

## 2020-09-14 RX ORDER — ACETAMINOPHEN 500 MG
1000 TABLET ORAL ONCE
Status: COMPLETED | OUTPATIENT
Start: 2020-09-14 | End: 2020-09-14

## 2020-09-14 RX ORDER — GLYCOPYRROLATE 0.2 MG/ML
INJECTION, SOLUTION INTRAMUSCULAR; INTRAVENOUS AS NEEDED
Status: DISCONTINUED | OUTPATIENT
Start: 2020-09-14 | End: 2020-09-14 | Stop reason: SURG

## 2020-09-14 RX ORDER — HYDROMORPHONE HYDROCHLORIDE 1 MG/ML
0.2 INJECTION, SOLUTION INTRAMUSCULAR; INTRAVENOUS; SUBCUTANEOUS EVERY 5 MIN PRN
Status: DISCONTINUED | OUTPATIENT
Start: 2020-09-14 | End: 2020-09-14

## 2020-09-14 RX ORDER — ACETAMINOPHEN 325 MG/1
650 TABLET ORAL EVERY 4 HOURS PRN
Status: CANCELLED | OUTPATIENT
Start: 2020-09-14

## 2020-09-14 RX ORDER — METOCLOPRAMIDE 10 MG/1
10 TABLET ORAL ONCE
Status: DISCONTINUED | OUTPATIENT
Start: 2020-09-14 | End: 2020-09-14 | Stop reason: HOSPADM

## 2020-09-14 RX ORDER — HYDROCODONE BITARTRATE AND ACETAMINOPHEN 5; 325 MG/1; MG/1
2 TABLET ORAL AS NEEDED
Status: DISCONTINUED | OUTPATIENT
Start: 2020-09-14 | End: 2020-09-14

## 2020-09-14 RX ORDER — HALOPERIDOL 5 MG/ML
0.25 INJECTION INTRAMUSCULAR ONCE AS NEEDED
Status: DISCONTINUED | OUTPATIENT
Start: 2020-09-14 | End: 2020-09-14

## 2020-09-14 RX ORDER — MORPHINE SULFATE 4 MG/ML
2 INJECTION, SOLUTION INTRAMUSCULAR; INTRAVENOUS EVERY 2 HOUR PRN
Status: CANCELLED | OUTPATIENT
Start: 2020-09-14

## 2020-09-14 RX ORDER — HYDROCODONE BITARTRATE AND ACETAMINOPHEN 5; 325 MG/1; MG/1
2 TABLET ORAL EVERY 4 HOURS PRN
Status: CANCELLED | OUTPATIENT
Start: 2020-09-14

## 2020-09-14 RX ORDER — SODIUM CHLORIDE, SODIUM LACTATE, POTASSIUM CHLORIDE, CALCIUM CHLORIDE 600; 310; 30; 20 MG/100ML; MG/100ML; MG/100ML; MG/100ML
INJECTION, SOLUTION INTRAVENOUS CONTINUOUS
Status: DISCONTINUED | OUTPATIENT
Start: 2020-09-14 | End: 2020-09-14

## 2020-09-14 RX ORDER — HYDROCODONE BITARTRATE AND ACETAMINOPHEN 5; 325 MG/1; MG/1
1 TABLET ORAL EVERY 4 HOURS PRN
Status: CANCELLED | OUTPATIENT
Start: 2020-09-14

## 2020-09-14 RX ORDER — PHENYLEPHRINE HCL 10 MG/ML
VIAL (ML) INJECTION AS NEEDED
Status: DISCONTINUED | OUTPATIENT
Start: 2020-09-14 | End: 2020-09-14 | Stop reason: SURG

## 2020-09-14 RX ORDER — ONDANSETRON 2 MG/ML
INJECTION INTRAMUSCULAR; INTRAVENOUS AS NEEDED
Status: DISCONTINUED | OUTPATIENT
Start: 2020-09-14 | End: 2020-09-14 | Stop reason: SURG

## 2020-09-14 RX ORDER — LIDOCAINE HYDROCHLORIDE 10 MG/ML
INJECTION, SOLUTION EPIDURAL; INFILTRATION; INTRACAUDAL; PERINEURAL AS NEEDED
Status: DISCONTINUED | OUTPATIENT
Start: 2020-09-14 | End: 2020-09-14 | Stop reason: SURG

## 2020-09-14 RX ORDER — DEXAMETHASONE SODIUM PHOSPHATE 4 MG/ML
VIAL (ML) INJECTION AS NEEDED
Status: DISCONTINUED | OUTPATIENT
Start: 2020-09-14 | End: 2020-09-14 | Stop reason: SURG

## 2020-09-14 RX ORDER — MORPHINE SULFATE 10 MG/ML
6 INJECTION, SOLUTION INTRAMUSCULAR; INTRAVENOUS EVERY 10 MIN PRN
Status: DISCONTINUED | OUTPATIENT
Start: 2020-09-14 | End: 2020-09-14

## 2020-09-14 RX ORDER — FAMOTIDINE 20 MG/1
20 TABLET ORAL ONCE
Status: DISCONTINUED | OUTPATIENT
Start: 2020-09-14 | End: 2020-09-14 | Stop reason: HOSPADM

## 2020-09-14 RX ORDER — HYDROCODONE BITARTRATE AND ACETAMINOPHEN 5; 325 MG/1; MG/1
1 TABLET ORAL AS NEEDED
Status: DISCONTINUED | OUTPATIENT
Start: 2020-09-14 | End: 2020-09-14

## 2020-09-14 RX ORDER — NALOXONE HYDROCHLORIDE 0.4 MG/ML
80 INJECTION, SOLUTION INTRAMUSCULAR; INTRAVENOUS; SUBCUTANEOUS AS NEEDED
Status: DISCONTINUED | OUTPATIENT
Start: 2020-09-14 | End: 2020-09-14

## 2020-09-14 RX ORDER — MORPHINE SULFATE 4 MG/ML
2 INJECTION, SOLUTION INTRAMUSCULAR; INTRAVENOUS EVERY 10 MIN PRN
Status: DISCONTINUED | OUTPATIENT
Start: 2020-09-14 | End: 2020-09-14

## 2020-09-14 RX ORDER — HYDROMORPHONE HYDROCHLORIDE 1 MG/ML
0.6 INJECTION, SOLUTION INTRAMUSCULAR; INTRAVENOUS; SUBCUTANEOUS EVERY 5 MIN PRN
Status: DISCONTINUED | OUTPATIENT
Start: 2020-09-14 | End: 2020-09-14

## 2020-09-14 RX ORDER — MORPHINE SULFATE 4 MG/ML
6 INJECTION, SOLUTION INTRAMUSCULAR; INTRAVENOUS EVERY 2 HOUR PRN
Status: CANCELLED | OUTPATIENT
Start: 2020-09-14

## 2020-09-14 RX ORDER — MORPHINE SULFATE 4 MG/ML
4 INJECTION, SOLUTION INTRAMUSCULAR; INTRAVENOUS EVERY 10 MIN PRN
Status: DISCONTINUED | OUTPATIENT
Start: 2020-09-14 | End: 2020-09-14

## 2020-09-14 RX ORDER — CEFAZOLIN SODIUM/WATER 2 G/20 ML
2 SYRINGE (ML) INTRAVENOUS ONCE
Status: DISCONTINUED | OUTPATIENT
Start: 2020-09-14 | End: 2020-09-14 | Stop reason: HOSPADM

## 2020-09-14 RX ORDER — BUPIVACAINE HYDROCHLORIDE AND EPINEPHRINE 2.5; 5 MG/ML; UG/ML
INJECTION, SOLUTION INFILTRATION; PERINEURAL AS NEEDED
Status: DISCONTINUED | OUTPATIENT
Start: 2020-09-14 | End: 2020-09-14

## 2020-09-14 RX ORDER — HYDROCODONE BITARTRATE AND ACETAMINOPHEN 5; 325 MG/1; MG/1
1 TABLET ORAL EVERY 6 HOURS PRN
Qty: 6 TABLET | Refills: 0 | Status: SHIPPED | OUTPATIENT
Start: 2020-09-14 | End: 2020-12-29

## 2020-09-14 RX ORDER — ISOSULFAN BLUE 50 MG/5ML
INJECTION, SOLUTION SUBCUTANEOUS AS NEEDED
Status: DISCONTINUED | OUTPATIENT
Start: 2020-09-14 | End: 2020-09-14

## 2020-09-14 RX ORDER — ONDANSETRON 2 MG/ML
4 INJECTION INTRAMUSCULAR; INTRAVENOUS ONCE AS NEEDED
Status: DISCONTINUED | OUTPATIENT
Start: 2020-09-14 | End: 2020-09-14

## 2020-09-14 RX ORDER — HYDROMORPHONE HYDROCHLORIDE 1 MG/ML
0.4 INJECTION, SOLUTION INTRAMUSCULAR; INTRAVENOUS; SUBCUTANEOUS EVERY 5 MIN PRN
Status: DISCONTINUED | OUTPATIENT
Start: 2020-09-14 | End: 2020-09-14

## 2020-09-14 RX ADMIN — SODIUM CHLORIDE, SODIUM LACTATE, POTASSIUM CHLORIDE, CALCIUM CHLORIDE: 600; 310; 30; 20 INJECTION, SOLUTION INTRAVENOUS at 10:26:00

## 2020-09-14 RX ADMIN — LIDOCAINE HYDROCHLORIDE 25 MG: 10 INJECTION, SOLUTION EPIDURAL; INFILTRATION; INTRACAUDAL; PERINEURAL at 09:53:00

## 2020-09-14 RX ADMIN — SODIUM CHLORIDE, SODIUM LACTATE, POTASSIUM CHLORIDE, CALCIUM CHLORIDE: 600; 310; 30; 20 INJECTION, SOLUTION INTRAVENOUS at 11:07:00

## 2020-09-14 RX ADMIN — DEXAMETHASONE SODIUM PHOSPHATE 8 MG: 4 MG/ML VIAL (ML) INJECTION at 10:21:00

## 2020-09-14 RX ADMIN — GLYCOPYRROLATE 0.2 MG: 0.2 INJECTION, SOLUTION INTRAMUSCULAR; INTRAVENOUS at 09:50:00

## 2020-09-14 RX ADMIN — ONDANSETRON 4 MG: 2 INJECTION INTRAMUSCULAR; INTRAVENOUS at 10:21:00

## 2020-09-14 RX ADMIN — PHENYLEPHRINE HCL 50 MCG: 10 MG/ML VIAL (ML) INJECTION at 10:00:00

## 2020-09-14 RX ADMIN — SODIUM CHLORIDE, SODIUM LACTATE, POTASSIUM CHLORIDE, CALCIUM CHLORIDE: 600; 310; 30; 20 INJECTION, SOLUTION INTRAVENOUS at 11:06:00

## 2020-09-14 NOTE — ANESTHESIA PROCEDURE NOTES
Airway  Date/Time: 9/14/2020 9:55 AM  Urgency: Elective    Airway not difficult    General Information and Staff    Patient location during procedure: OR  Anesthesiologist: Ruslan Montanez MD  Resident/CRNA: Cali East CRNA  Performed: RICHARD

## 2020-09-14 NOTE — PROCEDURES
[facility] HOSP - [facility]  Procedure Note    Detony Arandaito Patient Status:  Outpatient    10/14/1975 MRN [id number]   Location [place] [address] Attending Poonam Lainez MD   Hosp Day # 0 PCP Jamie Frances MD     Procedure: le

## 2020-09-14 NOTE — IMAGING NOTE
0745: Mrs. Chang Service arrived in the ultrasound department   Identified with spelling of name and date of birth. Medications and allergies reviewed. NKDA reported.     26:  scans completed by Kesha Lambert, ultrasound technologist    Hx taken: Malignant

## 2020-09-14 NOTE — INTERVAL H&P NOTE
Pre-op Diagnosis: Malignant neoplasm of left female breast, unspecified estrogen receptor status, unspecified site of [facility] [C50.912]    The above referenced H&P was reviewed by Chano Kumar MD on 9/14/2020, the patient was examined and no signif

## 2020-09-14 NOTE — ANESTHESIA PREPROCEDURE EVALUATION
Anesthesia PreOp Note    HPI:     Christian Hall is a 53year old female who presents for preoperative consultation requested by: Nanci Santos MD    Date of Surgery: 9/14/2020    Procedure(s):  BREAST LUMPECTOMY  Indication: Malignant neoplas time  Azelastine HCl 0.1 % Nasal Solution, 2 sprays by Nasal route 2 (two) times daily. , Disp: 3 Bottle, Rfl: 1, Unknown at Unknown time  Clobetasol Propionate 0.05 % External Foam, Use bid, Disp: 100 g, Rfl: 6, Unknown at Unknown time  Fluocinolone Aceton Social Needs      Financial resource strain: Not on file      Food insecurity:        Worry: Not on file        Inability: Not on file      Transportation needs:        Medical: Not on file        Non-medical: Not on file    Tobacco Use      Smoking status defibrillator: No        Breast feeding: Not Asked        Reaction to local anesthetic: No    Social History Narrative      Not on file      Available pre-op labs reviewed.   Lab Results   Component Value Date    WBC 7.0 08/20/2020    RBC 4.40 08/20/2020 nature of the anesthetic plan, benefits, risks including possible dental damage if relevant, major complications, and any alternative forms of anesthetic management. All of the patient's questions were answered to the best of my ability.  The patient ivania

## 2020-09-14 NOTE — BRIEF OP NOTE
Pre-Operative Diagnosis: Malignant neoplasm of left female breast, unspecified estrogen receptor status, unspecified site of breast [facility] [C50.912]     Post-Operative Diagnosis: Malignant neoplasm of left female breast, unspecified estrogen receptor status

## 2020-09-14 NOTE — OPERATIVE REPORT
Pre-Operative Diagnosis: Malignant neoplasm of left female breast, unspecified estrogen receptor status, unspecified site of breast [facility] [C50.912]     Post-Operative Diagnosis: Malignant neoplasm of left female breast, unspecified estrogen receptor status The patient was marked in the pre-operative holding area and the marking was confirmed by the patient. Procedure: The patient was taken to the operating room and was placed on the OR table in the supine position.   After the induction of general ane

## 2020-09-14 NOTE — ANESTHESIA POSTPROCEDURE EVALUATION
Patient: Anna Austin    Procedure Summary     Date:  09/14/20 Room / Location:  [address] [place] [address] / [address] [place] [address] MAIN OR    Anesthesia Start:  0530 Anesthesia Stop:  6167    Procedure:  BREAST LUMPECTOMY (Left ) Diagnosis:       Malignant neoplasm of left f

## 2020-09-16 RX ORDER — MONTELUKAST SODIUM 10 MG/1
10 TABLET ORAL NIGHTLY
Qty: 90 TABLET | Refills: 1 | Status: SHIPPED | OUTPATIENT
Start: 2020-09-16 | End: 2021-02-25

## 2020-09-16 RX ORDER — AZELASTINE 1 MG/ML
2 SPRAY, METERED NASAL 2 TIMES DAILY
Qty: 3 BOTTLE | Refills: 1 | Status: SHIPPED | OUTPATIENT
Start: 2020-09-16 | End: 2021-01-26

## 2020-09-16 NOTE — TELEPHONE ENCOUNTER
Current Outpatient Medications:   •  LOSARTAN POTASSIUM 50 MG Oral Tab, TAKE 1 TABLET BY MOUTH DAILY, Disp: 90 tablet, Rfl: 1

## 2020-09-16 NOTE — TELEPHONE ENCOUNTER
Received refill request for the following medications:       Singulair 10 MG - 1 tablet by mouth daily- #90       Qnasl nasal spray 80 mcg- 2 sprays each nostril daily- #3     Azelastine nasal spray 137 mcg- 2 sprays each nostril 2 times a day.     LOV 6-10

## 2020-09-17 ENCOUNTER — NURSE NAVIGATOR ENCOUNTER (OUTPATIENT)
Dept: HEMATOLOGY/ONCOLOGY | Facility: HOSPITAL | Age: 45
End: 2020-09-17

## 2020-09-17 ENCOUNTER — TELEPHONE (OUTPATIENT)
Dept: SURGERY | Facility: CLINIC | Age: 45
End: 2020-09-17

## 2020-09-17 ENCOUNTER — TELEPHONE (OUTPATIENT)
Dept: HEMATOLOGY/ONCOLOGY | Facility: HOSPITAL | Age: 45
End: 2020-09-17

## 2020-09-17 RX ORDER — LOSARTAN POTASSIUM 50 MG/1
50 TABLET ORAL DAILY
Qty: 90 TABLET | Refills: 1 | Status: SHIPPED | OUTPATIENT
Start: 2020-09-17 | End: 2020-12-09

## 2020-09-17 NOTE — TELEPHONE ENCOUNTER
Phoned patient for continued care coordination. Patient shares she is doing well s/p lumpectomy with Dr. Mitchell Clark, performed 9/14/20. Scheduled consultation with Dr. Sussy Hammond for tomorrow at [address] [place] [address].  All appointment information provided to patient.   She acknowledg

## 2020-09-18 ENCOUNTER — OFFICE VISIT (OUTPATIENT)
Dept: HEMATOLOGY/ONCOLOGY | Facility: HOSPITAL | Age: 45
End: 2020-09-18
Attending: INTERNAL MEDICINE
Payer: COMMERCIAL

## 2020-09-18 ENCOUNTER — LAB REQUISITION (OUTPATIENT)
Dept: LAB | Facility: HOSPITAL | Age: 45
End: 2020-09-18
Payer: COMMERCIAL

## 2020-09-18 VITALS
OXYGEN SATURATION: 100 % | BODY MASS INDEX: 26.4 KG/M2 | DIASTOLIC BLOOD PRESSURE: 89 MMHG | HEIGHT: 63 IN | SYSTOLIC BLOOD PRESSURE: 156 MMHG | HEART RATE: 62 BPM | TEMPERATURE: 99 F | RESPIRATION RATE: 16 BRPM | WEIGHT: 149 LBS

## 2020-09-18 DIAGNOSIS — Z17.0 MALIGNANT NEOPLASM OF OVERLAPPING SITES OF LEFT BREAST IN FEMALE, ESTROGEN RECEPTOR POSITIVE (HCC): Primary | ICD-10-CM

## 2020-09-18 DIAGNOSIS — C50.912 MALIGNANT NEOPLASM OF UNSPECIFIED SITE OF LEFT FEMALE BREAST (HCC): ICD-10-CM

## 2020-09-18 DIAGNOSIS — C50.812 MALIGNANT NEOPLASM OF OVERLAPPING SITES OF LEFT BREAST IN FEMALE, ESTROGEN RECEPTOR POSITIVE (HCC): Primary | ICD-10-CM

## 2020-09-18 PROCEDURE — 99245 OFF/OP CONSLTJ NEW/EST HI 55: CPT | Performed by: INTERNAL MEDICINE

## 2020-09-18 PROCEDURE — 88363 XM ARCHIVE TISSUE MOLEC ANAL: CPT | Performed by: PATHOLOGY

## 2020-09-18 NOTE — TELEPHONE ENCOUNTER
Dr. Mitchell Clark,    Patient would like to RTW this Monday the 21st and connot unless she has this note signed by you. Please sign off on form:  -Highlight the patient and hit \"Chart\" button.   -In Chart Review, w/in the Encounter tab - click 1 time on the

## 2020-09-18 NOTE — CONSULTS
TANI Vickers is a 53year old female who is here today due to new diagnosis of Malignant neoplasm of overlapping sites of left breast in female, estrogen receptor positive (hcc)  (primary encounter diagnosis)     The patient underwent Has psoriasis and was on Humira for 5 yrs and now on [place] for 3 yrs. Risk factors for breast cancer:  Premenopausal   Menses age 9-12. OCP use in the past:  Yes. Cumulative number of years:  21, states that until 3 yrs ago.   Switched due to • HYDROcodone-acetaminophen 5-325 MG Oral Tab Take 1 tablet by mouth every 6 (six) hours as needed for Pain.  6 tablet 0   • OTEZLA 30 MG Oral Tab TAKE 1 TABLET TWICE A DAY 60 tablet 9   • Clobetasol Propionate 0.05 % External Foam Use bid 100 g 6   • Fluoc • Stroke Mother         Cause of death   • Hypertension Mother    • Diabetes Maternal Grandmother    • Hypertension Sister    • Hypertension Brother    • Glaucoma Neg    • Macular degeneration Neg          PHYSICAL EXAM:    /89 (BP Location: Right ar Discussed with the patient that NCCN guidelines recommend for ER/NM + node neg tumors >/= 5 mm to submit for Oncotype Dx to determine if benefit from adjuvant chemotherapy in addition to adjuvant hormonal therapy.   Discussed possible outcomes of this test Surgical Pathology                                Case: [id number]                                 Authorizing Provider:  Olga Lidia Marlow MD      Collected:           09/14/2020 10:35 AM         Ordering Location:     [address] [place] [address] The tissue that has been submitted for tumor markers by manual quantitative and semi quantitative analysis was fixed in 10% neutral buffered formalin, following the recommended CAP guidelines time fixation (6-72 hours ).      All antibodies are validated to Comment: Morphometric analysis for ER, AZ, HER-2/yuko, and Ki-67 were performed on the initial biopsy specimen, the results of which are transcribed below. HER-2/yuko FISH was also submitted on the initial biopsy specimen which showed no amplification.   Rep Lymphovascular Invasion  Not identified    Dermal Lymphovascular Invasion  No skin present    Microcalcifications  Not identified    Treatment Effect in the Breast  No known presurgical therapy    MARGINS   Invasive Carcinoma Margins  Uninvolved by invasiv Surgical Pathology                                Case: [id number]                                   Authorizing Provider:  Kraig Reddy MD             Collected:           08/14/2020 08:53 AM           Ordering Location:     [address],[facility] The tissue that has been submitted for tumor markers by manual quantitative and semi quantitative analysis was fixed in 10% neutral buffered formalin, following the recommended CAP guidelines time fixation (6-72 hours).      All antibodies are validated to TECHNIQUE:    Digital diagnostic mammography of the left breast was performed. 3D tomosynthesis was performed and reviewed. BREAST COMPOSITION:          CATEGORY b- There are scattered areas of fibroglandular density.         FINDINGS:        Ther COMPARISON: [address] [place] [address] HARMONY Chang PF DIGITAL SCREEN W CAD, 2/15/2014, 10:39 AM.  [address] [place] [address] HARMONY Chang BONNIE 2D+3D SCREENING BILAT ([id number]), 3/30/2019, 2:19 PM.     INDICATIONS:  Encounter for screening mammogram for malignant neoplas Dictated by (CST): Valeria Jarrell MD on 8/10/2020 at 7:04 AM       Finalized by (CST):  Valeria Jarrell MD on 8/10/2020 at 7:08 AM

## 2020-09-21 ENCOUNTER — OFFICE VISIT (OUTPATIENT)
Dept: SURGERY | Facility: CLINIC | Age: 45
End: 2020-09-21
Payer: COMMERCIAL

## 2020-09-21 VITALS — WEIGHT: 149 LBS | HEIGHT: 63 IN | BODY MASS INDEX: 26.4 KG/M2

## 2020-09-21 DIAGNOSIS — Z98.890 POST-OPERATIVE STATE: Primary | ICD-10-CM

## 2020-09-21 PROCEDURE — 99024 POSTOP FOLLOW-UP VISIT: CPT | Performed by: SURGERY

## 2020-09-21 PROCEDURE — 3008F BODY MASS INDEX DOCD: CPT | Performed by: SURGERY

## 2020-09-21 NOTE — PROGRESS NOTES
S:  Riley Leon is a 53year old female sp lumpectomy and SN procedure  Doing well, no fevers, no chills, tolerating a general diet, generally normal bowel movements, no calf tenderness nor lower extremity edema, no shortness of breath, no chest

## 2020-09-21 NOTE — TELEPHONE ENCOUNTER
Dr. Maureen Marin, sorry that was my error and should of been the 8/20/20 encounter. Patient has RTW letter and I will sign and initial her official RTW form so there is no issues.

## 2020-09-25 ENCOUNTER — TELEPHONE (OUTPATIENT)
Dept: HEMATOLOGY/ONCOLOGY | Facility: HOSPITAL | Age: 45
End: 2020-09-25

## 2020-09-25 NOTE — TELEPHONE ENCOUNTER
Message left for patient to please return call to Breast RN Navigator concerning appointment scheduling with Dr. Marline Bustamante.

## 2020-09-25 NOTE — TELEPHONE ENCOUNTER
Patient returned call. Appointment scheduled with Dr. Evelin Da Silva to discuss results of Oncotype DX for Tuesday 9/29/20 at 10am.  Patient acknowledged and denied questions.

## 2020-09-29 ENCOUNTER — OFFICE VISIT (OUTPATIENT)
Dept: HEMATOLOGY/ONCOLOGY | Facility: HOSPITAL | Age: 45
End: 2020-09-29
Attending: INTERNAL MEDICINE
Payer: COMMERCIAL

## 2020-09-29 VITALS
HEART RATE: 80 BPM | DIASTOLIC BLOOD PRESSURE: 86 MMHG | WEIGHT: 149 LBS | RESPIRATION RATE: 16 BRPM | HEIGHT: 63 IN | TEMPERATURE: 97 F | SYSTOLIC BLOOD PRESSURE: 128 MMHG | BODY MASS INDEX: 26.4 KG/M2 | OXYGEN SATURATION: 100 %

## 2020-09-29 DIAGNOSIS — Z17.0 MALIGNANT NEOPLASM OF OVERLAPPING SITES OF LEFT BREAST IN FEMALE, ESTROGEN RECEPTOR POSITIVE (HCC): Primary | ICD-10-CM

## 2020-09-29 DIAGNOSIS — C50.812 MALIGNANT NEOPLASM OF OVERLAPPING SITES OF LEFT BREAST IN FEMALE, ESTROGEN RECEPTOR POSITIVE (HCC): Primary | ICD-10-CM

## 2020-09-29 PROCEDURE — 99214 OFFICE O/P EST MOD 30 MIN: CPT | Performed by: INTERNAL MEDICINE

## 2020-09-29 NOTE — PROGRESS NOTES
HPI       Jolanta Crain is a 53year old female who is here today for f/u diagnosis of Malignant neoplasm of overlapping sites of left breast in female, estrogen receptor positive (hcc)  (primary encounter diagnosis)       She is here for result complication 8/87/3083   • Psoriasis      Past Surgical History:   Procedure Laterality Date   • BREAST LUMPECTOMY Left 9/14/2020    Performed by Olga Lidia Marlow MD at [address]   • COLONOSCOPY N/A 9/11/2018    Performed by Marybeth De Leon, TAILORX trial the risk of distant recurrence at 9 years with 5 years of adjuvant hormonal therapy is 6% in the group average absolute benefit from chemotherapy is less than 1%.   Discussed with the patient that when stratified by age patient's less than or questions answered to the best of my abilities. Support provided to the patient. This visit lasted 25 minutes with greater than 50% of the visit for discussion of prognosis and treatment. No follow-ups on file.   No orders of the defined types were p

## 2020-10-02 ENCOUNTER — OFFICE VISIT (OUTPATIENT)
Dept: RADIATION ONCOLOGY | Facility: HOSPITAL | Age: 45
End: 2020-10-02
Attending: RADIOLOGY
Payer: COMMERCIAL

## 2020-10-02 VITALS
RESPIRATION RATE: 16 BRPM | WEIGHT: 149 LBS | HEART RATE: 84 BPM | BODY MASS INDEX: 26.4 KG/M2 | TEMPERATURE: 99 F | SYSTOLIC BLOOD PRESSURE: 138 MMHG | DIASTOLIC BLOOD PRESSURE: 85 MMHG | HEIGHT: 63 IN

## 2020-10-02 PROCEDURE — 99212 OFFICE O/P EST SF 10 MIN: CPT

## 2020-10-02 NOTE — PROGRESS NOTES
Nursing Consultation Note  Patient: Libertad Bains  YOB: 1975  Age: 53year old  Radiation Oncologist: Dr. Slime Michaels  Referring Physician: Deann Perales  Diagnosis:No diagnosis found.   Consult Date: 10/2/2020      Chemotherapy: No MCG/ACT Nasal Aero Soln 2 Squirts by Nasal route daily. 3 Inhaler 1   • HYDROcodone-acetaminophen 5-325 MG Oral Tab Take 1 tablet by mouth every 6 (six) hours as needed for Pain.  (Patient not taking: Reported on 10/2/2020 ) 6 tablet 0   • OTEZLA 30 MG Oral N/A 9/11/2018    Performed by Janelle Pimentel MD at [address] [place] [address],[address] ENDO   • [address]  6/23/2016   • LUMPECTOMY LEFT  09/14/2020   • MOUTH SURGERY PROC UNLISTED  12/5/2011    Jaw surgery/metal implant   • REMOVAL OF TONSILS,12+ Y/O  6/ Asked        Caffeine Concern: Yes          16 oz soda daily        Occupational Exposure: Not Asked        Hobby Hazards: Not Asked        Sleep Concern: Not Asked        Stress Concern: Not Asked        Weight Concern: Not Asked        Special Diet: Not Motivated  Learning preferences:  Discussion, Written and Handout  Barriers to learning:  None  Interventions to reduce barriers:  Consult, Face patient when speaking, Provide support/encouragement, Provide printed materials and Patient to express feelings Encourage fluids/diet    Expected Outcomes:  knowledge of care plan    Progress Toward Outcome:  Making progress    Pamphlets/Handouts Given to Patient:  Basic skin care instructions provided.

## 2020-10-02 NOTE — CONSULTS
RADIATION ONCOLOGY NOTE    DATE OF VISIT: 10/2/2020    DIAGNOSIS :  Stage IA pT1b, pN0(sn), cM0, G2, ER+, IL+, HER2- ER/IL positive and node negative s/p lumpectomy and SLNB, for adjuvant XRT and endocrine tx.       Dear Duy Padilla and Margaret Ricks, colleagues,    Pe 9/14/2020 at 9:31 AM          [facility] Post Procedure Image Left (cpt=77065)    Result Date: 9/14/2020  PROCEDURE: [facility] POST PROCEDURE IMAGE LEFT (CPT=77065)   FINDINGS: Please refer to the separately dictated report for same day ultrasound-guided needle wire lo of Systems   Constitutional: Negative. HENT: Negative. Eyes: Negative. Respiratory: Negative. Cardiovascular: Negative. Gastrointestinal: Negative. Endocrine: Negative. Genitourinary: Negative. Musculoskeletal: Negative.     Skin: Ne Corneal ulcer- right eye (2/17/2016), High blood pressure, tonsillectomy (06/23/16), Hypertension, Internal hemorrhoids without complication (0/36/5540), and Psoriasis.  She also has no past medical history of Anesthesia complication, Deep vein thrombosis ( quadrant. EXTREMITIES:  There is no upper or lower extremity edema. NEUROLOGIC:  Cranial nerves II-XII are intact. COMPLETED TESTS:  I have reviewed the patient's clinical, radiographic, pathologic and laboratory studies.     ASSESSMENT/PLAN    46 D) - Axilla left, 4. tissue adjacent to sentinel nodes left axilla                   Addendum 2   At the request of Dr. Lukas Hawkins, archived paraffin-embedded tissue has been submitted for Oncotype Dx analysis.   Results will for metastatic carcinoma (0/1)  · Additional deeper sections and keratin AE1/AE3 stains (B1 & B2) are examined and support the diagnosis     C.  Left breast; left breast lumpectomy:  · Invasive ductal carcinoma, [place] grade II (tubules 3, nuclei 2, sheree

## 2020-10-09 ENCOUNTER — APPOINTMENT (OUTPATIENT)
Dept: RADIATION ONCOLOGY | Facility: HOSPITAL | Age: 45
End: 2020-10-09
Attending: RADIOLOGY
Payer: COMMERCIAL

## 2020-10-09 PROCEDURE — 77290 THER RAD SIMULAJ FIELD CPLX: CPT | Performed by: RADIOLOGY

## 2020-10-09 PROCEDURE — 77333 RADIATION TREATMENT AID(S): CPT | Performed by: RADIOLOGY

## 2020-10-13 PROCEDURE — 77300 RADIATION THERAPY DOSE PLAN: CPT | Performed by: RADIOLOGY

## 2020-10-13 PROCEDURE — 77334 RADIATION TREATMENT AID(S): CPT | Performed by: RADIOLOGY

## 2020-10-13 PROCEDURE — 77295 3-D RADIOTHERAPY PLAN: CPT | Performed by: RADIOLOGY

## 2020-10-16 ENCOUNTER — APPOINTMENT (OUTPATIENT)
Dept: RADIATION ONCOLOGY | Facility: HOSPITAL | Age: 45
End: 2020-10-16
Attending: RADIOLOGY
Payer: COMMERCIAL

## 2020-10-23 ENCOUNTER — APPOINTMENT (OUTPATIENT)
Dept: RADIATION ONCOLOGY | Facility: HOSPITAL | Age: 45
End: 2020-10-23
Attending: RADIOLOGY
Payer: COMMERCIAL

## 2020-10-23 PROCEDURE — 77280 THER RAD SIMULAJ FIELD SMPL: CPT | Performed by: RADIOLOGY

## 2020-10-26 ENCOUNTER — TELEPHONE (OUTPATIENT)
Dept: HEMATOLOGY/ONCOLOGY | Facility: HOSPITAL | Age: 45
End: 2020-10-26

## 2020-10-26 ENCOUNTER — OFFICE VISIT (OUTPATIENT)
Dept: RADIATION ONCOLOGY | Facility: HOSPITAL | Age: 45
End: 2020-10-26
Attending: RADIOLOGY
Payer: COMMERCIAL

## 2020-10-26 VITALS
SYSTOLIC BLOOD PRESSURE: 127 MMHG | TEMPERATURE: 97 F | RESPIRATION RATE: 16 BRPM | HEART RATE: 78 BPM | DIASTOLIC BLOOD PRESSURE: 85 MMHG

## 2020-10-26 DIAGNOSIS — C50.812 MALIGNANT NEOPLASM OF OVERLAPPING SITES OF LEFT BREAST IN FEMALE, ESTROGEN RECEPTOR POSITIVE (HCC): Primary | ICD-10-CM

## 2020-10-26 DIAGNOSIS — Z17.0 MALIGNANT NEOPLASM OF OVERLAPPING SITES OF LEFT BREAST IN FEMALE, ESTROGEN RECEPTOR POSITIVE (HCC): Primary | ICD-10-CM

## 2020-10-26 PROCEDURE — 77412 RADIATION TX DELIVERY LVL 3: CPT | Performed by: RADIOLOGY

## 2020-10-26 PROCEDURE — 77387 GUIDANCE FOR RADJ TX DLVR: CPT | Performed by: RADIOLOGY

## 2020-10-26 NOTE — PROGRESS NOTES
[facility] Radiation Treatment Management Note 1-5    Patient:  Carroll Abreu  Age:  50year old  Visit Diagnosis:    1.  Malignant neoplasm of overlapping sites of left breast in female, estrogen receptor positive ([facility]

## 2020-10-26 NOTE — TELEPHONE ENCOUNTER
Jacek Schmidt says she will now end radiation on 11/23, and her post radiation f/u with  is scheduled for the next day 11/24. She wanted to know if this was okay, or if it had to be pushed out a little further.

## 2020-10-26 NOTE — TELEPHONE ENCOUNTER
Returned phone call and notified ok to keep 11/24 appointment. Informed if RT completion date changes can push back appointment. Pt verbalizes understanding.

## 2020-10-27 PROCEDURE — 77290 THER RAD SIMULAJ FIELD CPLX: CPT | Performed by: RADIOLOGY

## 2020-10-27 PROCEDURE — 77387 GUIDANCE FOR RADJ TX DLVR: CPT | Performed by: RADIOLOGY

## 2020-10-27 PROCEDURE — 77412 RADIATION TX DELIVERY LVL 3: CPT | Performed by: RADIOLOGY

## 2020-10-28 PROCEDURE — 77412 RADIATION TX DELIVERY LVL 3: CPT | Performed by: RADIOLOGY

## 2020-10-28 PROCEDURE — 77387 GUIDANCE FOR RADJ TX DLVR: CPT | Performed by: RADIOLOGY

## 2020-10-29 PROCEDURE — 77412 RADIATION TX DELIVERY LVL 3: CPT | Performed by: RADIOLOGY

## 2020-10-29 PROCEDURE — 77387 GUIDANCE FOR RADJ TX DLVR: CPT | Performed by: RADIOLOGY

## 2020-10-30 PROCEDURE — 77412 RADIATION TX DELIVERY LVL 3: CPT | Performed by: RADIOLOGY

## 2020-10-30 PROCEDURE — 77336 RADIATION PHYSICS CONSULT: CPT | Performed by: RADIOLOGY

## 2020-10-30 PROCEDURE — 77387 GUIDANCE FOR RADJ TX DLVR: CPT | Performed by: RADIOLOGY

## 2020-11-01 ENCOUNTER — APPOINTMENT (OUTPATIENT)
Dept: RADIATION ONCOLOGY | Facility: HOSPITAL | Age: 45
End: 2020-11-01
Attending: RADIOLOGY
Payer: COMMERCIAL

## 2020-11-02 ENCOUNTER — OFFICE VISIT (OUTPATIENT)
Dept: RADIATION ONCOLOGY | Facility: HOSPITAL | Age: 45
End: 2020-11-02
Attending: RADIOLOGY
Payer: COMMERCIAL

## 2020-11-02 VITALS
RESPIRATION RATE: 16 BRPM | HEART RATE: 83 BPM | DIASTOLIC BLOOD PRESSURE: 93 MMHG | SYSTOLIC BLOOD PRESSURE: 142 MMHG | TEMPERATURE: 98 F

## 2020-11-02 DIAGNOSIS — Z17.0 MALIGNANT NEOPLASM OF OVERLAPPING SITES OF LEFT BREAST IN FEMALE, ESTROGEN RECEPTOR POSITIVE (HCC): Primary | ICD-10-CM

## 2020-11-02 DIAGNOSIS — C50.812 MALIGNANT NEOPLASM OF OVERLAPPING SITES OF LEFT BREAST IN FEMALE, ESTROGEN RECEPTOR POSITIVE (HCC): Primary | ICD-10-CM

## 2020-11-02 PROCEDURE — 77387 GUIDANCE FOR RADJ TX DLVR: CPT | Performed by: RADIOLOGY

## 2020-11-02 PROCEDURE — 77412 RADIATION TX DELIVERY LVL 3: CPT | Performed by: RADIOLOGY

## 2020-11-02 NOTE — PROGRESS NOTES
[facility] Radiation Treatment Management Note 6-10    Patient:  Melchor Motta  Age:  50year old  Visit Diagnosis:    1.  Malignant neoplasm of overlapping sites of left breast in female, estrogen receptor positive ([facility]

## 2020-11-03 PROCEDURE — 77387 GUIDANCE FOR RADJ TX DLVR: CPT | Performed by: RADIOLOGY

## 2020-11-03 PROCEDURE — 77412 RADIATION TX DELIVERY LVL 3: CPT | Performed by: RADIOLOGY

## 2020-11-04 PROCEDURE — 77412 RADIATION TX DELIVERY LVL 3: CPT | Performed by: RADIOLOGY

## 2020-11-04 PROCEDURE — 77387 GUIDANCE FOR RADJ TX DLVR: CPT | Performed by: RADIOLOGY

## 2020-11-05 PROCEDURE — 77412 RADIATION TX DELIVERY LVL 3: CPT | Performed by: RADIOLOGY

## 2020-11-05 PROCEDURE — 77387 GUIDANCE FOR RADJ TX DLVR: CPT | Performed by: RADIOLOGY

## 2020-11-06 PROCEDURE — 77412 RADIATION TX DELIVERY LVL 3: CPT | Performed by: RADIOLOGY

## 2020-11-06 PROCEDURE — 77387 GUIDANCE FOR RADJ TX DLVR: CPT | Performed by: RADIOLOGY

## 2020-11-06 PROCEDURE — 77336 RADIATION PHYSICS CONSULT: CPT | Performed by: RADIOLOGY

## 2020-11-09 ENCOUNTER — OFFICE VISIT (OUTPATIENT)
Dept: RADIATION ONCOLOGY | Facility: HOSPITAL | Age: 45
End: 2020-11-09
Attending: RADIOLOGY
Payer: COMMERCIAL

## 2020-11-09 VITALS — SYSTOLIC BLOOD PRESSURE: 122 MMHG | HEART RATE: 84 BPM | DIASTOLIC BLOOD PRESSURE: 86 MMHG | RESPIRATION RATE: 16 BRPM

## 2020-11-09 DIAGNOSIS — C50.812 MALIGNANT NEOPLASM OF OVERLAPPING SITES OF LEFT BREAST IN FEMALE, ESTROGEN RECEPTOR POSITIVE (HCC): Primary | ICD-10-CM

## 2020-11-09 DIAGNOSIS — Z17.0 MALIGNANT NEOPLASM OF OVERLAPPING SITES OF LEFT BREAST IN FEMALE, ESTROGEN RECEPTOR POSITIVE (HCC): Primary | ICD-10-CM

## 2020-11-09 PROCEDURE — 77333 RADIATION TREATMENT AID(S): CPT | Performed by: RADIOLOGY

## 2020-11-09 PROCEDURE — 77290 THER RAD SIMULAJ FIELD CPLX: CPT | Performed by: RADIOLOGY

## 2020-11-09 PROCEDURE — 77412 RADIATION TX DELIVERY LVL 3: CPT | Performed by: RADIOLOGY

## 2020-11-09 NOTE — PROGRESS NOTES
[facility] Radiation Treatment Management Note 11-15    Patient:  Kasia Leyva  Age:  50year old  Visit Diagnosis:    1.  Malignant neoplasm of overlapping sites of left breast in female, estrogen receptor positive ([facility]

## 2020-11-10 PROCEDURE — 77412 RADIATION TX DELIVERY LVL 3: CPT | Performed by: RADIOLOGY

## 2020-11-10 PROCEDURE — 77387 GUIDANCE FOR RADJ TX DLVR: CPT | Performed by: RADIOLOGY

## 2020-11-11 PROCEDURE — 77334 RADIATION TREATMENT AID(S): CPT | Performed by: RADIOLOGY

## 2020-11-11 PROCEDURE — 77412 RADIATION TX DELIVERY LVL 3: CPT | Performed by: RADIOLOGY

## 2020-11-11 PROCEDURE — 77295 3-D RADIOTHERAPY PLAN: CPT | Performed by: RADIOLOGY

## 2020-11-11 PROCEDURE — 77387 GUIDANCE FOR RADJ TX DLVR: CPT | Performed by: RADIOLOGY

## 2020-11-11 PROCEDURE — 77300 RADIATION THERAPY DOSE PLAN: CPT | Performed by: RADIOLOGY

## 2020-11-12 PROCEDURE — 77387 GUIDANCE FOR RADJ TX DLVR: CPT | Performed by: RADIOLOGY

## 2020-11-12 PROCEDURE — 77412 RADIATION TX DELIVERY LVL 3: CPT | Performed by: RADIOLOGY

## 2020-11-13 PROCEDURE — 77387 GUIDANCE FOR RADJ TX DLVR: CPT | Performed by: RADIOLOGY

## 2020-11-13 PROCEDURE — 77336 RADIATION PHYSICS CONSULT: CPT | Performed by: RADIOLOGY

## 2020-11-13 PROCEDURE — 77412 RADIATION TX DELIVERY LVL 3: CPT | Performed by: RADIOLOGY

## 2020-11-16 ENCOUNTER — OFFICE VISIT (OUTPATIENT)
Dept: RADIATION ONCOLOGY | Facility: HOSPITAL | Age: 45
End: 2020-11-16
Attending: RADIOLOGY
Payer: COMMERCIAL

## 2020-11-16 VITALS
TEMPERATURE: 98 F | HEART RATE: 81 BPM | DIASTOLIC BLOOD PRESSURE: 92 MMHG | SYSTOLIC BLOOD PRESSURE: 131 MMHG | RESPIRATION RATE: 16 BRPM

## 2020-11-16 DIAGNOSIS — Z17.0 MALIGNANT NEOPLASM OF OVERLAPPING SITES OF LEFT BREAST IN FEMALE, ESTROGEN RECEPTOR POSITIVE (HCC): Primary | ICD-10-CM

## 2020-11-16 DIAGNOSIS — C50.812 MALIGNANT NEOPLASM OF OVERLAPPING SITES OF LEFT BREAST IN FEMALE, ESTROGEN RECEPTOR POSITIVE (HCC): Primary | ICD-10-CM

## 2020-11-16 PROCEDURE — 77387 GUIDANCE FOR RADJ TX DLVR: CPT | Performed by: RADIOLOGY

## 2020-11-16 PROCEDURE — 77412 RADIATION TX DELIVERY LVL 3: CPT | Performed by: RADIOLOGY

## 2020-11-16 NOTE — PROGRESS NOTES
[facility] Radiation Treatment Management Note 16-20    Patient:  Dixon Grace  Age:  50year old  Visit Diagnosis:    1.  Malignant neoplasm of overlapping sites of left breast in female, estrogen receptor positive ([facility]

## 2020-11-16 NOTE — PATIENT INSTRUCTIONS
Continue to moisturize for the next 2-3 weeks. Please call with any questions or concerns to RN number [phone]    SPF 27 or greater when exposed to the sun, as your skin will remain sun sensitive for months, even years.      Follow up with Dr Vanessa Weathers in

## 2020-11-17 ENCOUNTER — APPOINTMENT (OUTPATIENT)
Dept: RADIATION ONCOLOGY | Facility: HOSPITAL | Age: 45
End: 2020-11-17
Attending: RADIOLOGY
Payer: COMMERCIAL

## 2020-11-17 ENCOUNTER — APPOINTMENT (OUTPATIENT)
Dept: HEMATOLOGY/ONCOLOGY | Facility: HOSPITAL | Age: 45
End: 2020-11-17
Attending: INTERNAL MEDICINE
Payer: COMMERCIAL

## 2020-11-17 PROCEDURE — 77412 RADIATION TX DELIVERY LVL 3: CPT | Performed by: RADIOLOGY

## 2020-11-17 PROCEDURE — 77280 THER RAD SIMULAJ FIELD SMPL: CPT | Performed by: RADIOLOGY

## 2020-11-18 PROCEDURE — 77412 RADIATION TX DELIVERY LVL 3: CPT | Performed by: RADIOLOGY

## 2020-11-18 PROCEDURE — 77387 GUIDANCE FOR RADJ TX DLVR: CPT | Performed by: RADIOLOGY

## 2020-11-19 PROCEDURE — 77412 RADIATION TX DELIVERY LVL 3: CPT | Performed by: RADIOLOGY

## 2020-11-19 PROCEDURE — 77387 GUIDANCE FOR RADJ TX DLVR: CPT | Performed by: RADIOLOGY

## 2020-11-20 PROCEDURE — 77412 RADIATION TX DELIVERY LVL 3: CPT | Performed by: RADIOLOGY

## 2020-11-20 PROCEDURE — 77387 GUIDANCE FOR RADJ TX DLVR: CPT | Performed by: RADIOLOGY

## 2020-11-20 PROCEDURE — 77336 RADIATION PHYSICS CONSULT: CPT | Performed by: RADIOLOGY

## 2020-11-20 NOTE — PROGRESS NOTES
RADIATION ONCOLOGY COMPLETION SUMMARY NOTE    DIAGNOSIS :  Stage IA pT1b, pN0(sn), cM0, G2, ER+, WI+, HER2- ER/WI positive and node negative s/p lumpectomy and SLNB, s/p adjuvant XRT on 11/20/20       Dear Duy Esteves and Catharine Carrel, colleagues,    Per breast tumor Date Elapsed Days   L Breast 6X 266 16 / 16 0 4,256 10/26/2020 11/16/2020 21   L Breast Bst 10X/18X/6X 250 4 / 4 0 1,000 11/17/2020 11/20/2020 3   Total:     5,256 10/26/2020 11/20/2020 25       Treatment Technique:  3D Conformal  Prone Tangents    Overall

## 2020-11-23 ENCOUNTER — APPOINTMENT (OUTPATIENT)
Dept: RADIATION ONCOLOGY | Facility: HOSPITAL | Age: 45
End: 2020-11-23
Attending: RADIOLOGY
Payer: COMMERCIAL

## 2020-11-24 ENCOUNTER — OFFICE VISIT (OUTPATIENT)
Dept: HEMATOLOGY/ONCOLOGY | Facility: HOSPITAL | Age: 45
End: 2020-11-24
Attending: INTERNAL MEDICINE
Payer: COMMERCIAL

## 2020-11-24 VITALS
OXYGEN SATURATION: 100 % | DIASTOLIC BLOOD PRESSURE: 88 MMHG | SYSTOLIC BLOOD PRESSURE: 138 MMHG | HEART RATE: 67 BPM | WEIGHT: 152 LBS | RESPIRATION RATE: 16 BRPM | HEIGHT: 62.99 IN | TEMPERATURE: 99 F | BODY MASS INDEX: 26.93 KG/M2

## 2020-11-24 DIAGNOSIS — Z17.0 MALIGNANT NEOPLASM OF OVERLAPPING SITES OF LEFT BREAST IN FEMALE, ESTROGEN RECEPTOR POSITIVE (HCC): Primary | ICD-10-CM

## 2020-11-24 DIAGNOSIS — C50.812 MALIGNANT NEOPLASM OF OVERLAPPING SITES OF LEFT BREAST IN FEMALE, ESTROGEN RECEPTOR POSITIVE (HCC): Primary | ICD-10-CM

## 2020-11-24 PROCEDURE — 99214 OFFICE O/P EST MOD 30 MIN: CPT | Performed by: INTERNAL MEDICINE

## 2020-11-24 RX ORDER — TAMOXIFEN CITRATE 20 MG/1
20 TABLET ORAL DAILY
Qty: 90 TABLET | Refills: 3 | Status: SHIPPED | OUTPATIENT
Start: 2020-11-24 | End: 2021-11-03

## 2020-11-24 NOTE — PROGRESS NOTES
TANI Solis Sarwat is a 50year old female who is here today for follow-up diagnosis of Malignant neoplasm of overlapping sites of left breast in female, estrogen receptor positive (hcc)  (primary encounter diagnosis)       Patient complete Foam Use bid 100 g 6   • Fluocinolone Acetonide (DERMOTIC) 0.01 % Otic Oil Use qhs to ears for psoriasis 20 mL 6   • triamcinolone acetonide 0.1 % External Cream Apply topically 2 (two) times daily.  apply by TOPICAL route 2 times every day a thin film to t Cause of death   • Hypertension Mother    • Diabetes Maternal Grandmother    • Hypertension Sister    • Hypertension Brother    • Glaucoma Neg    • Macular degeneration Neg          PHYSICAL EXAM:    /88 (BP Location: Right arm, Patient Position: Sit Discussed with the patient that when stratified by age patient's less than or equal to 48years old the benefit from chemotherapy and is approximately 1.6%.   Discussed with the patient the potential side effects of chemotherapy, and also discussed with the

## 2020-12-09 RX ORDER — LOSARTAN POTASSIUM 50 MG/1
50 TABLET ORAL DAILY
Qty: 90 TABLET | Refills: 1 | Status: SHIPPED | OUTPATIENT
Start: 2020-12-09 | End: 2021-05-31

## 2020-12-17 ENCOUNTER — OFFICE VISIT (OUTPATIENT)
Dept: SURGERY | Facility: CLINIC | Age: 45
End: 2020-12-17
Payer: COMMERCIAL

## 2020-12-17 VITALS — BODY MASS INDEX: 26.93 KG/M2 | WEIGHT: 152 LBS | HEIGHT: 63 IN

## 2020-12-17 DIAGNOSIS — C50.912 MALIGNANT NEOPLASM OF LEFT FEMALE BREAST, UNSPECIFIED ESTROGEN RECEPTOR STATUS, UNSPECIFIED SITE OF BREAST (HCC): Primary | ICD-10-CM

## 2020-12-17 PROCEDURE — 3008F BODY MASS INDEX DOCD: CPT | Performed by: SURGERY

## 2020-12-17 PROCEDURE — 99214 OFFICE O/P EST MOD 30 MIN: CPT | Performed by: SURGERY

## 2020-12-17 NOTE — PROGRESS NOTES
Chief complaint: Patient presents with: Follow - Up: Follow up, JOSE MIGUEL CA. Patient completed radiation 3 weeks ago, and feels fatigued. HPI: Radha Larson presents forCBE. She has completed radiation. She has some redness persiting after treatment.  She is tir HYDROcodone-acetaminophen 5-325 MG Oral Tab Take 1 tablet by mouth every 6 (six) hours as needed for Pain.  (Patient not taking: Reported on 10/2/2020 ) 6 tablet 0   • OTEZLA 30 MG Oral Tab TAKE 1 TABLET TWICE A DAY 60 tablet 9   • Clobetasol Propionate 0.0 congestion, sinus pain or ST  LUNGS: denies shortness of breath with exertion  CARDIOVASCULAR: denies chest pain on exertion  GI: no hematemesis, no BRBPR, no worsening heartburn  : no dysuria, no blood in urine, no difficulty urinating  MUSCULOSKELETAL: breast, unspecified estrogen receptor status, unspecified site of breast ([facility]    S/P lumpectomy, SN procedure and radiation. Today the patient and I reviewed her pathology, discussed surveillance plan, SBE, Vit D, healthy lifestyle.  RTC 3 mos for CBE

## 2020-12-29 ENCOUNTER — OFFICE VISIT (OUTPATIENT)
Dept: HEMATOLOGY/ONCOLOGY | Facility: HOSPITAL | Age: 45
End: 2020-12-29
Attending: NURSE PRACTITIONER
Payer: COMMERCIAL

## 2020-12-29 DIAGNOSIS — Z71.9 COUNSELING, UNSPECIFIED: ICD-10-CM

## 2020-12-29 DIAGNOSIS — Z08 ENCOUNTER FOR FOLLOW-UP EXAMINATION AFTER COMPLETED TREATMENT FOR MALIGNANT NEOPLASM: ICD-10-CM

## 2020-12-29 DIAGNOSIS — Z85.3 PERSONAL HISTORY OF BREAST CANCER: Primary | ICD-10-CM

## 2020-12-29 PROCEDURE — 99215 OFFICE O/P EST HI 40 MIN: CPT | Performed by: NURSE PRACTITIONER

## 2020-12-29 RX ORDER — BIOTIN 1 MG
1 TABLET ORAL DAILY
COMMUNITY

## 2020-12-29 NOTE — PROGRESS NOTES
I met with Alma for a Survivorship Clinic visit to provide a survivorship care plan (SCP) and information related to post-treatment care. She has a diagnosis of ER+/NM+ HER2-, Stage IA left breast cancer.   She had a left lumpectomy with sentinel node bio plan.  We reviewed all elements of both documents. She is aware that a letter and SCP will be sent to her PCP, Jozef Alexis. Reviewed Cancer Surveillance (office visits, imaging, other) and that Dr. Luke Sepulveda will oversee this care.   She is scheduled to se diet and she admits to needing to make improvements in her diet. Current BMI is 26.9.       The patient received a take-home folder that included the following survivorship resources:   -SCP and patient letter  -Mikki Stout [id number]

## 2020-12-30 ENCOUNTER — APPOINTMENT (OUTPATIENT)
Dept: HEMATOLOGY/ONCOLOGY | Facility: HOSPITAL | Age: 45
End: 2020-12-30
Attending: INTERNAL MEDICINE
Payer: COMMERCIAL

## 2021-01-14 ENCOUNTER — SOCIAL WORK SERVICES (OUTPATIENT)
Dept: HEMATOLOGY/ONCOLOGY | Facility: HOSPITAL | Age: 46
End: 2021-01-14

## 2021-01-14 NOTE — PROGRESS NOTES
1/14:SW received call from the pt regarding new [facility] paperwork. Pt states her previous paperwork is valid until 02/12/2021.  Pt states she has already had surgery and completed her radiation treatment, but has appointments with Dr. Khari Daniel and Dr. Susy Moncada, as well

## 2021-01-15 ENCOUNTER — OFFICE VISIT (OUTPATIENT)
Dept: DERMATOLOGY CLINIC | Facility: CLINIC | Age: 46
End: 2021-01-15
Payer: COMMERCIAL

## 2021-01-15 DIAGNOSIS — D23.5 BENIGN NEOPLASM OF SKIN OF TRUNK, EXCEPT SCROTUM: ICD-10-CM

## 2021-01-15 DIAGNOSIS — D23.60 BENIGN NEOPLASM OF SKIN OF UPPER LIMB, INCLUDING SHOULDER, UNSPECIFIED LATERALITY: ICD-10-CM

## 2021-01-15 DIAGNOSIS — D48.5 NEOPLASM OF UNCERTAIN BEHAVIOR OF SKIN: Primary | ICD-10-CM

## 2021-01-15 DIAGNOSIS — Z51.81 MEDICATION MONITORING ENCOUNTER: ICD-10-CM

## 2021-01-15 DIAGNOSIS — L40.8 OTHER PSORIASIS: ICD-10-CM

## 2021-01-15 DIAGNOSIS — L40.9 PSORIASIS: ICD-10-CM

## 2021-01-15 DIAGNOSIS — D23.4 BENIGN NEOPLASM OF SCALP AND SKIN OF NECK: ICD-10-CM

## 2021-01-15 DIAGNOSIS — D23.30 BENIGN NEOPLASM OF SKIN OF FACE: ICD-10-CM

## 2021-01-15 PROCEDURE — 99214 OFFICE O/P EST MOD 30 MIN: CPT | Performed by: DERMATOLOGY

## 2021-01-15 PROCEDURE — 11102 TANGNTL BX SKIN SINGLE LES: CPT | Performed by: DERMATOLOGY

## 2021-01-15 PROCEDURE — 88305 TISSUE EXAM BY PATHOLOGIST: CPT | Performed by: DERMATOLOGY

## 2021-01-15 RX ORDER — APREMILAST 30 MG/1
1 TABLET, FILM COATED ORAL 2 TIMES DAILY
Qty: 60 TABLET | Refills: 11 | Status: SHIPPED | OUTPATIENT
Start: 2021-01-15 | End: 2021-11-12

## 2021-01-18 NOTE — PROGRESS NOTES
The pathology report from last visit showed    right lateral canthus-Polypoid seborrheic keratosis and chronic folliculitis . Please log in test results, send biopsy results letter. Pt to rtc 6 months or prn.

## 2021-01-20 ENCOUNTER — OFFICE VISIT (OUTPATIENT)
Dept: PHYSICAL THERAPY | Facility: HOSPITAL | Age: 46
End: 2021-01-20
Attending: NURSE PRACTITIONER
Payer: COMMERCIAL

## 2021-01-20 DIAGNOSIS — Z85.3 PERSONAL HISTORY OF BREAST CANCER: ICD-10-CM

## 2021-01-20 PROCEDURE — 97161 PT EVAL LOW COMPLEX 20 MIN: CPT | Performed by: PHYSICAL THERAPIST

## 2021-01-20 PROCEDURE — 97140 MANUAL THERAPY 1/> REGIONS: CPT | Performed by: PHYSICAL THERAPIST

## 2021-01-20 NOTE — PROGRESS NOTES
UE LYMPHEDEMA EVALUATION:     Referring Physician: Dr. Grisel Lynne  Diagnosis: Personal history of breast cancer (Z85.3)    Date of onset: 9/14/2020 Evaluation Date: 1/20/2021     PATIENT SUMMARY   Alma No is a 50year old female who pr infection, garment prescription , compression device prescription and education/self management.     OBJECTIVE:     Sensation:  Denies numbness or tingling     AROM/Strength:     Shoulder AROM:    Flex: 165 B    Abd: 170 B (slight \"pulling\" on left)    Sh care. Discussed and demonstrated bandaging and compression options including various vendors that can be utilized        Charges: Physical Therapy Eval: Low Complexity, mm2      Total Timed Treatment: 30 min     Total Treatment Time: 75 min     Based on cl

## 2021-01-25 NOTE — PROGRESS NOTES
Juana Scruggs is a 50year old female. HPI:     CC:  Patient presents with:  Full Skin Exam: established pt, presents for full body exam, no hx of skin CA. Pt has no concerns. Psoriasis: area affcted: right elbow. Pt on otezla 30mg BID.  Sometim Oral Cap Take 1 tablet by mouth daily. • losartan Potassium 50 MG Oral Tab Take 1 tablet (50 mg total) by mouth daily. 90 tablet 1   • Tamoxifen Citrate 20 MG Oral Tab Take 1 tablet (20 mg total) by mouth daily.  90 tablet 3   • Montelukast Sodium 10 MG History    Socioeconomic History      Marital status:       Spouse name: Not on file      Number of children: Not on file      Years of education: Not on file      Highest education level: Not on file    Occupational History      Not on file    Soci Bike Helmet: Not Asked        Seat Belt: Not Asked        Self-Exams: Not Asked        Grew up on a farm: Not Asked        History of tanning: Not Asked        Outdoor occupation: Not Asked        Pt has a pacemaker: No        Pt has a defibrillator: N conjunctival mucosa, eyelids, lips external ears, back, chest,/ breasts, axillae,  abdomen, arms, legs, palms.      Multiple light to medium brown, well marginated, uniformly pigmented, macules and papules 6 mm and less scattered on exam. pigmented lesions especially with recent diagnosis of breast cancer follow-up every 6 months for skin exam.    Psoriasis. Plaque-type. Previously>10% body surface involvement. Including scalp. Elbows knees more persistent scattered lesions on trunk and extremities.   Sta p.r.n. The patient indicates understanding of these issues and agrees to the plan. The patient is asked to return as noted in follow-up/ above. This note was generated using Dragon voice recognition software.   Please contact me regarding any confusi

## 2021-01-25 NOTE — PROGRESS NOTES
Operative Report                     Shave/  Tangential biopsy     Clinical diagnosis:    Size of lesion:    Location:Diagnosis/Clinical History: pt with irritated changing lesion -draining white purulent material intermittently  Spec 1 Description >

## 2021-01-26 ENCOUNTER — OFFICE VISIT (OUTPATIENT)
Dept: PHYSICAL THERAPY | Facility: HOSPITAL | Age: 46
End: 2021-01-26
Attending: NURSE PRACTITIONER
Payer: COMMERCIAL

## 2021-01-26 PROCEDURE — 97140 MANUAL THERAPY 1/> REGIONS: CPT | Performed by: PHYSICAL THERAPIST

## 2021-01-26 PROCEDURE — 97110 THERAPEUTIC EXERCISES: CPT | Performed by: PHYSICAL THERAPIST

## 2021-01-26 RX ORDER — AZELASTINE 1 MG/ML
2 SPRAY, METERED NASAL 2 TIMES DAILY
Qty: 90 ML | Refills: 0 | Status: SHIPPED | OUTPATIENT
Start: 2021-01-26

## 2021-01-26 NOTE — PROGRESS NOTES
Referring Physician: Dr. Mandi Song  Diagnosis: Personal history of breast cancer (Z85.3)    Date of onset: 9/14/2020 Evaluation Date: 1/20/2021       Stemmer's Sign: negative    Arm Volume Measurements:   Lymphedema Calculations 1/20/2021   DATE ME (medially)  - mild swelling L axilla w/ decreased tissue mobility  - left axilla incision tender   - mild cording L axilla, not visible w/ ROM but noted w/ palpation at end range abduction         Today’s Treatment and Response:   Date 1/20/2021 1/26/2021 will decrease as symptoms improve.      Treatment will include: Complete Decongestive Therapy: Manual Therapy, Self-Care/Home Management Training, Therapeutic Exercise, Neuromuscular Re-education, Orthotic Management and Training                Charges: mm2

## 2021-02-01 ENCOUNTER — OFFICE VISIT (OUTPATIENT)
Dept: PHYSICAL THERAPY | Facility: HOSPITAL | Age: 46
End: 2021-02-01
Attending: NURSE PRACTITIONER
Payer: COMMERCIAL

## 2021-02-01 PROCEDURE — 97140 MANUAL THERAPY 1/> REGIONS: CPT | Performed by: PHYSICAL THERAPIST

## 2021-02-01 PROCEDURE — 97110 THERAPEUTIC EXERCISES: CPT | Performed by: PHYSICAL THERAPIST

## 2021-02-01 NOTE — PROGRESS NOTES
Referring Physician: Dr. Prasanna Suresh  Diagnosis: Personal history of breast cancer (Z85.3)    Date of onset: 9/14/2020 Evaluation Date: 1/20/2021       Stemmer's Sign: negative    Arm Volume Measurements:   Lymphedema Calculations 1/20/2021   DATE ME sleep\"      1/20/2021 (Evaluation findings):  AROM/Strength:     Shoulder AROM:    Flex: 165 B    Abd: 170 B (slight \"pulling\" on left)    Shoulder strength: 5/5 B  Posture:  Good   Edema/Tissue Observations:    - mild swelling L breast (medially)  - mi practices, and importance of skin care. Discussed and demonstrated bandaging and compression options including various vendors that can be utilized                 Assessment: Pt reporting no symptoms after treatment.  Discussed w/ pt to cont w/ HEP x 1 wemargarita

## 2021-02-11 ENCOUNTER — APPOINTMENT (OUTPATIENT)
Dept: RADIATION ONCOLOGY | Facility: HOSPITAL | Age: 46
End: 2021-02-11
Attending: RADIOLOGY
Payer: COMMERCIAL

## 2021-02-15 ENCOUNTER — OFFICE VISIT (OUTPATIENT)
Dept: PHYSICAL THERAPY | Facility: HOSPITAL | Age: 46
End: 2021-02-15
Attending: NURSE PRACTITIONER
Payer: COMMERCIAL

## 2021-02-15 PROCEDURE — 97140 MANUAL THERAPY 1/> REGIONS: CPT | Performed by: PHYSICAL THERAPIST

## 2021-02-15 NOTE — PROGRESS NOTES
Referring Physician: Dr. Erleen Kussmaul ref.  provider found  Diagnosis: Personal history of breast cancer (Z85.3)    Date of onset: 9/14/2020 Evaluation Date: 1/20/2021       Stemmer's Sign: negative    Arm Volume Measurements:   Lymphedema Calculations 1/20/2021 base of thumb    1/26/2021:  Mild cording left axilla, only palpable w/ skin stretch while in abduction  + neural tension LUE noted (pt reporting hand \"sometimes wanting to go to sleep\"      1/20/2021 (Evaluation findings):  AROM/Strength:     Shoulder A thread the needle x10  - kneeling lat str x 5  - kneeling \"wiper\" str x 5  - supine abd str in hookly rotated to right x 3  - supine shld flexed, hookly LTR x 10  - sidely shoulder horiz abd w/ trunk rotation x 10  - sitting trunk SB x 10    See handouts

## 2021-02-19 ENCOUNTER — OFFICE VISIT (OUTPATIENT)
Dept: RADIATION ONCOLOGY | Facility: HOSPITAL | Age: 46
End: 2021-02-19
Attending: RADIOLOGY
Payer: COMMERCIAL

## 2021-02-19 VITALS
HEART RATE: 84 BPM | WEIGHT: 149 LBS | TEMPERATURE: 98 F | HEIGHT: 63 IN | DIASTOLIC BLOOD PRESSURE: 77 MMHG | RESPIRATION RATE: 16 BRPM | BODY MASS INDEX: 26.4 KG/M2 | SYSTOLIC BLOOD PRESSURE: 125 MMHG

## 2021-02-19 PROCEDURE — 99211 OFF/OP EST MAY X REQ PHY/QHP: CPT

## 2021-02-19 NOTE — PROGRESS NOTES
Pt seen in 3 month follow up with Dr Joice Boxer, having completed radiation to the L breast 11/20/20. L mammogram scheduled for 5/5 and will see Dr Kerry Bustillos 5/24. Tolerating the Tamoxifen well at this time. Continues to feel fatigued. Seeing PT for L axillary cording.

## 2021-02-19 NOTE — PROGRESS NOTES
RADIATION ONCOLOGY NOTE    DATE OF VISIT: 2/19/2021    DIAGNOSIS :  Stage IA pT1b, pN0(sn), cM0, G2, ER+, UT+, HER2- ER/UT positive and node negative s/p lumpectomy and SLNB, s/p adjuvant XRT on 11/20/20, doing well on adjuvant hormonal blockade.      Dear obtained and is as above and per HPI and nursing note. Current Outpatient Medications   Medication Sig Dispense Refill   • Azelastine HCl 0.1 % Nasal Solution 2 sprays by Nasal route 2 (two) times daily.  90 mL 0   • Apremilast (OTEZLA) 30 MG Oral T (postoperative nausea and vomiting), Pseudocholinesterase deficiency, Pulmonary embolism ([address] Skin cancer, Sleep apnea, or Type 1 diabetes mellitus ([address]     PAST SURGICAL HISTORY:   has a past surgical history that includes mouth surgery proc unlisted ( VA+, HER2- ER/VA positive and node negative s/p lumpectomy and SLNB, s/p adjuvant XRT on 11/20/20, doing well on adjuvant hormonal blockade. She is on adjuvan Tamoxifen, with the expected side effects.   She has mild L arm cording and follows with PT.

## 2021-02-23 ENCOUNTER — OFFICE VISIT (OUTPATIENT)
Dept: PHYSICAL THERAPY | Facility: HOSPITAL | Age: 46
End: 2021-02-23
Attending: NURSE PRACTITIONER
Payer: COMMERCIAL

## 2021-02-23 PROCEDURE — 97110 THERAPEUTIC EXERCISES: CPT | Performed by: PHYSICAL THERAPIST

## 2021-02-23 PROCEDURE — 97140 MANUAL THERAPY 1/> REGIONS: CPT | Performed by: PHYSICAL THERAPIST

## 2021-02-23 NOTE — PROGRESS NOTES
Referring Physician: Sandy SHEETS  Diagnosis: Personal history of breast cancer (Z85.3)    Date of onset: 9/14/2020 Evaluation Date: 1/20/2021         Precautions:  Breast cancer, HTN  Education or treatment limitation: Breast cancer     Past Med and upper arm    Compression:  - fitted for prophylactic compression sleeve and gauntlet (black)  - palm: 19.3  - wrist: 15.5  - elbow: 24.6  - upper arm: 34  - length: 44 cm   Manual Therapy (20 minutes)    STM and MLD techniques to L axilla and upper arm Self-Care  (10 minutes):   Management/Education: Explained Lymphedema diagnosis; informed patient of lymphedema precautions and risk reduction practices, and importance of skin care.  Discussed and demonstrated bandaging and compression options including

## 2021-02-25 RX ORDER — BECLOMETHASONE DIPROPIONATE 80 UG/1
2 AEROSOL, METERED NASAL DAILY
Qty: 31.8 G | Refills: 0 | Status: SHIPPED | OUTPATIENT
Start: 2021-02-25 | End: 2021-07-24

## 2021-02-25 RX ORDER — MONTELUKAST SODIUM 10 MG/1
10 TABLET ORAL NIGHTLY
Qty: 90 TABLET | Refills: 0 | Status: SHIPPED | OUTPATIENT
Start: 2021-02-25 | End: 2021-05-26

## 2021-02-25 NOTE — TELEPHONE ENCOUNTER
Patient last seen in Allergy 6/10/2020 for .  . .    Chronic sinusitis, unspecified location  (primary encounter diagnosis)  Allergic rhinitis, unspecified seasonality, unspecified trigger  Environmental and seasonal allergies     Refill request received fo

## 2021-03-01 ENCOUNTER — OFFICE VISIT (OUTPATIENT)
Dept: PHYSICAL THERAPY | Facility: HOSPITAL | Age: 46
End: 2021-03-01
Attending: NURSE PRACTITIONER
Payer: COMMERCIAL

## 2021-03-01 PROCEDURE — 97140 MANUAL THERAPY 1/> REGIONS: CPT | Performed by: PHYSICAL THERAPIST

## 2021-03-01 NOTE — PROGRESS NOTES
Referring Physician: Sandy SEHETS  Diagnosis: Personal history of breast cancer (Z85.3)    Date of onset: 9/14/2020 Evaluation Date: 1/20/2021         Precautions:  Breast cancer, HTN  Education or treatment limitation: Breast cancer     Past Med Shoulder strength: 5/5 B  Posture:  Good   Edema/Tissue Observations:    - mild swelling L breast (medially)  - mild swelling L axilla w/ decreased tissue mobility  - left axilla incision tender   - mild cording L axilla, not visible w/ ROM but noted w/ to improve ease of dressing/bathing/and reaching overhead        Plan:     Pt to do HEP daily, wear compression garments as needed  Re-assess in 1 week  Cont as needed for strengthening and STM     Treatment will include: Complete Decongestive Therapy: Man

## 2021-03-09 ENCOUNTER — OFFICE VISIT (OUTPATIENT)
Dept: PHYSICAL THERAPY | Facility: HOSPITAL | Age: 46
End: 2021-03-09
Attending: NURSE PRACTITIONER
Payer: COMMERCIAL

## 2021-03-09 PROCEDURE — 97140 MANUAL THERAPY 1/> REGIONS: CPT | Performed by: PHYSICAL THERAPIST

## 2021-03-09 NOTE — PROGRESS NOTES
Referring Physician: Sandy SHEETS  Diagnosis: Personal history of breast cancer (Z85.3)    Date of onset: 9/14/2020 Evaluation Date: 1/20/2021         Precautions:  Breast cancer, HTN  Education or treatment limitation: Breast cancer     Past Med (slight \"pulling\" on left)    Shoulder strength: 5/5 B  Posture:  Good   Edema/Tissue Observations:    - mild swelling L breast (medially)  - mild swelling L axilla w/ decreased tissue mobility  - left axilla incision tender   - mild cording L axilla, no from home pump to assist w/ self MLD. Goals:        To be met in 10 visits:  1) Pt to be independent with self MLD, ROM exercises, and donning/doffing compression bandages/garments to facilitate swelling reduction and to be independent at self management

## 2021-03-15 ENCOUNTER — TELEPHONE (OUTPATIENT)
Dept: HEMATOLOGY/ONCOLOGY | Facility: HOSPITAL | Age: 46
End: 2021-03-15

## 2021-03-15 NOTE — TELEPHONE ENCOUNTER
Faxed signed order by Dr. Meg Hernandez to [facility] for Pneumatic Compression Device.

## 2021-03-16 ENCOUNTER — OFFICE VISIT (OUTPATIENT)
Dept: PHYSICAL THERAPY | Facility: HOSPITAL | Age: 46
End: 2021-03-16
Attending: NURSE PRACTITIONER
Payer: COMMERCIAL

## 2021-03-16 PROCEDURE — 97140 MANUAL THERAPY 1/> REGIONS: CPT | Performed by: PHYSICAL THERAPIST

## 2021-03-17 DIAGNOSIS — Z85.3 PERSONAL HISTORY OF BREAST CANCER: ICD-10-CM

## 2021-03-17 DIAGNOSIS — C50.812 MALIGNANT NEOPLASM OF OVERLAPPING SITES OF LEFT BREAST IN FEMALE, ESTROGEN RECEPTOR POSITIVE (HCC): Primary | ICD-10-CM

## 2021-03-17 DIAGNOSIS — Z17.0 MALIGNANT NEOPLASM OF OVERLAPPING SITES OF LEFT BREAST IN FEMALE, ESTROGEN RECEPTOR POSITIVE (HCC): Primary | ICD-10-CM

## 2021-03-23 ENCOUNTER — OFFICE VISIT (OUTPATIENT)
Dept: PHYSICAL THERAPY | Facility: HOSPITAL | Age: 46
End: 2021-03-23
Attending: NURSE PRACTITIONER
Payer: COMMERCIAL

## 2021-03-23 ENCOUNTER — TELEPHONE (OUTPATIENT)
Dept: HEMATOLOGY/ONCOLOGY | Facility: HOSPITAL | Age: 46
End: 2021-03-23

## 2021-03-23 PROCEDURE — 97140 MANUAL THERAPY 1/> REGIONS: CPT | Performed by: PHYSICAL THERAPIST

## 2021-03-23 NOTE — PROGRESS NOTES
Referring Physician: Sandy SHEETS  Diagnosis: Personal history of breast cancer (Z85.3)    Date of onset: 9/14/2020 Evaluation Date: 1/20/2021     Progress Summary    Pt has attended 9 visits in Physical Therapy.      Pt w/ swelling L breast, tend symptoms assess her posture, avoid leaning on elbow/prolonged flex of elbow and to assess neck positioning    - swelling L breast (+ pitting)    Circumference (7 cm from nipple): L: 35.3   R: 34.5    - tenderness L axilla      3/16/2021:  - swelling L karsten and Response:   3/9/2021 3/16/2021 3/23/2021   7/10 8/10 9/19  PN sent   Manual Therapy (45 min)    MLD: Neck sequence, abd sequence, R axilla, A-A, L inguinal, L A-I, L breast, LUE.  Lotion used during MLD    STM to left pect    Initiated instr in self MLD Charges: mm3    Total Timed Treatment: 40  min  Total Treatment Time: 60  min

## 2021-03-29 ENCOUNTER — OFFICE VISIT (OUTPATIENT)
Dept: SURGERY | Facility: CLINIC | Age: 46
End: 2021-03-29
Payer: COMMERCIAL

## 2021-03-29 ENCOUNTER — OFFICE VISIT (OUTPATIENT)
Dept: PHYSICAL THERAPY | Facility: HOSPITAL | Age: 46
End: 2021-03-29
Attending: NURSE PRACTITIONER
Payer: COMMERCIAL

## 2021-03-29 ENCOUNTER — NURSE TRIAGE (OUTPATIENT)
Dept: INTERNAL MEDICINE CLINIC | Facility: CLINIC | Age: 46
End: 2021-03-29

## 2021-03-29 VITALS — WEIGHT: 149 LBS | BODY MASS INDEX: 26.4 KG/M2 | HEIGHT: 63 IN

## 2021-03-29 DIAGNOSIS — Z98.890 POST-OPERATIVE STATE: Primary | ICD-10-CM

## 2021-03-29 PROCEDURE — 97140 MANUAL THERAPY 1/> REGIONS: CPT

## 2021-03-29 PROCEDURE — 3008F BODY MASS INDEX DOCD: CPT | Performed by: SURGERY

## 2021-03-29 PROCEDURE — 99213 OFFICE O/P EST LOW 20 MIN: CPT | Performed by: SURGERY

## 2021-03-29 NOTE — TELEPHONE ENCOUNTER
Action Requested: Summary for Provider     []  Critical Lab, Recommendations Needed  [] Need Additional Advice  []   FYI    []   Need Orders  [] Need Medications Sent to Pharmacy  []  Other     SUMMARY: OV scheduled with Dr Abhishek Garcia 4/1/21.     Pt reports in

## 2021-03-29 NOTE — PROGRESS NOTES
S:  Umair Turner is a 50year old female sp lumpectomy, SN, Radiation  Doing well, no fevers, no chills, tolerating a general diet, generally normal bowel movements, no calf tenderness nor lower extremity edema, no shortness of breath, no chest p

## 2021-03-29 NOTE — PROGRESS NOTES
Referring Physician: Sandy SHEETS  Diagnosis: Personal history of breast cancer (Z85.3)    Date of onset: 9/14/2020 Evaluation Date: 1/20/2021     PHYSICAL THERAPY NOTE:    Certification From: 6/56/3166      To: 6/21/2021            precautions: axilla      2/23/2021:  Cording L axilla (does not restrict ROM, only visible w/ end range stretching  L shld AROM WFL, strength 5/5 but pt reports feeling weak in arm     2/15/2021:  Cerv spine screen: ROM WFL, no symptoms w/ repeated motions  L shoulder axilla    Compression:  - pt wearing compression garments  - discussed w/ pt possibly purchasing swell spot for add'l breast compression - will pursue if needed (pt to get home pump to trial first as wearing swell spot in bra would be difficult at work)  [place]

## 2021-04-01 ENCOUNTER — LAB ENCOUNTER (OUTPATIENT)
Dept: LAB | Age: 46
End: 2021-04-01
Attending: INTERNAL MEDICINE
Payer: COMMERCIAL

## 2021-04-01 DIAGNOSIS — F41.9 ANXIETY: ICD-10-CM

## 2021-04-01 DIAGNOSIS — R53.83 FATIGUE, UNSPECIFIED TYPE: ICD-10-CM

## 2021-04-01 PROCEDURE — 83036 HEMOGLOBIN GLYCOSYLATED A1C: CPT

## 2021-04-01 PROCEDURE — 85027 COMPLETE CBC AUTOMATED: CPT

## 2021-04-01 PROCEDURE — 80053 COMPREHEN METABOLIC PANEL: CPT

## 2021-04-01 PROCEDURE — 84443 ASSAY THYROID STIM HORMONE: CPT

## 2021-04-01 PROCEDURE — 84439 ASSAY OF FREE THYROXINE: CPT

## 2021-04-01 PROCEDURE — 36415 COLL VENOUS BLD VENIPUNCTURE: CPT

## 2021-04-01 PROCEDURE — 81015 MICROSCOPIC EXAM OF URINE: CPT

## 2021-04-03 NOTE — PROGRESS NOTES
HPI:    Patient ID: Donn Shin is a 50year old female.     HPI    Ongoing follow up with oncology dx breast CA   She is doing well  Except for  Generalized anxiety disorder  Pt  With stressors including the pandemic  And deathj in  [place]  She i 90 tablet 3   • Clobetasol Propionate 0.05 % External Foam Use bid 100 g 6   • Fluocinolone Acetonide (DERMOTIC) 0.01 % Otic Oil Use qhs to ears for psoriasis 20 mL 6   • triamcinolone acetonide 0.1 % External Cream Apply topically 2 (two) times daily.  vidya Smokeless tobacco: Never Used      Tobacco comment: Grew up with a smoker, no present household smokers.      Vaping Use      Vaping Use: Never assessed    Alcohol use: No      Alcohol/week: 0.0 standard drinks      Comment: rarely    Drug use: No      Comm 35.0 - 48.0 % 38.6   MCV      80.0 - 100.0 fL 90.6   MCH      26.0 - 34.0 pg 28.6   MCHC      31.0 - 37.0 g/dL 31.6   RDW      11.0 - 15.0 % 14.3   RDW-SD      35.1 - 46.3 fL 47.4 (H)   Platelet Count      824.0 - 450.0 10(3)uL 290.0   WBC Urine      0 mild L arm cording and follows with PT.    She otherwise has healed well from XRT.       She has mild GERD symptoms and will take OTC meds.     Pt will also continue radiographic surveillance.       As the patient is doing well, I will see patient on a PRN

## 2021-04-06 ENCOUNTER — OFFICE VISIT (OUTPATIENT)
Dept: PHYSICAL THERAPY | Facility: HOSPITAL | Age: 46
End: 2021-04-06
Attending: NURSE PRACTITIONER
Payer: COMMERCIAL

## 2021-04-06 PROCEDURE — 97140 MANUAL THERAPY 1/> REGIONS: CPT | Performed by: PHYSICAL THERAPIST

## 2021-04-06 NOTE — PROGRESS NOTES
Referring Physician: Sandy SHEETS  Diagnosis: Personal history of breast cancer (Z85.3)    Date of onset: 9/14/2020 Evaluation Date: 1/20/2021     PHYSICAL THERAPY NOTE:    Certification From: 3/39/2179      To: 6/21/2021            precautions: small scratch in left axilla      2/23/2021:  Cording L axilla (does not restrict ROM, only visible w/ end range stretching  L shld AROM WFL, strength 5/5 but pt reports feeling weak in arm     2/15/2021:  Cerv spine screen: ROM WFL, no symptoms w/ repeate during MLD    STM to left pect and L axilla    Compression:  - pt wearing compression garments  - discussed w/ pt possibly purchasing swell spot for add'l breast compression - will pursue if needed (pt to get home pump to trial first as wearing swell spot

## 2021-04-13 ENCOUNTER — OFFICE VISIT (OUTPATIENT)
Dept: PHYSICAL THERAPY | Facility: HOSPITAL | Age: 46
End: 2021-04-13
Attending: NURSE PRACTITIONER
Payer: COMMERCIAL

## 2021-04-13 PROCEDURE — 97140 MANUAL THERAPY 1/> REGIONS: CPT

## 2021-04-13 PROCEDURE — 97110 THERAPEUTIC EXERCISES: CPT

## 2021-04-13 NOTE — PROGRESS NOTES
Referring Physician: Sandy SHEETS  Diagnosis: Personal history of breast cancer (Z85.3)    Date of onset: 9/14/2020 Evaluation Date: 1/20/2021     PHYSICAL THERAPY NOTE:    Certification From: 9/29/1809      To: 6/21/2021            precautions: - slight redness L axilla (instr pt to monitor redness, if worsens to contact MD to r/o infection)  - small scratch in left axilla      2/23/2021:  Cording L axilla (does not restrict ROM, only visible w/ end range stretching  L shld AROM WFL, strength 5 side bend stretch   *reinforced importance of daily stretching and to make sure to get to end range feeling \"pulling\" each time. Assessment: Pt continues to have relief following MLD and STM in clinic.   Still waiting on home compression pum

## 2021-04-20 ENCOUNTER — OFFICE VISIT (OUTPATIENT)
Dept: PHYSICAL THERAPY | Facility: HOSPITAL | Age: 46
End: 2021-04-20
Attending: NURSE PRACTITIONER
Payer: COMMERCIAL

## 2021-04-20 DIAGNOSIS — Z17.0 MALIGNANT NEOPLASM OF OVERLAPPING SITES OF LEFT BREAST IN FEMALE, ESTROGEN RECEPTOR POSITIVE (HCC): Primary | ICD-10-CM

## 2021-04-20 DIAGNOSIS — C50.812 MALIGNANT NEOPLASM OF OVERLAPPING SITES OF LEFT BREAST IN FEMALE, ESTROGEN RECEPTOR POSITIVE (HCC): Primary | ICD-10-CM

## 2021-04-20 DIAGNOSIS — I89.0 LYMPHEDEMA: ICD-10-CM

## 2021-04-20 PROCEDURE — 97140 MANUAL THERAPY 1/> REGIONS: CPT | Performed by: PHYSICAL THERAPIST

## 2021-04-20 NOTE — PROGRESS NOTES
Referring Physician: Sandy SHEETS  Diagnosis: Personal history of breast cancer (Z85.3)    Date of onset: 9/14/2020 Evaluation Date: 1/20/2021     PHYSICAL THERAPY NOTE:    Certification From: 2/72/4670      To: 6/21/2021            precautions: RUE   MEASUREMENT A 15.3 15.5   MEASUREMENT B 17.8 18.6   MEASUREMENT C 21.4 22.1   MEASUREMENT D 23.9 24.8   MEASUREMENT E 24.8 24.9   MEASUREMENT F 24.3 25.8   MEASUREMENT G 26.6 28.3   MEASUREMENT H 29.2 31.5   MEASUREMENT I 31.9 34.6    TOTAL VOLUME  1 inner/upper arm in abducted position, tissue tightness palpable mid upper/inner arm and at base of thumb    1/26/2021:  Mild cording left axilla, only palpable w/ skin stretch while in abduction  + neural tension LUE noted (pt reporting hand \"sometimes wa of daily stretching and to make sure to get to end range feeling \"pulling\" each time. Assessment: Pt w/ increase left breast swelling since last session. Swelling reduces w/ MLD.  Patient is unable to effectively perform self MLD (cannot fu

## 2021-04-21 ENCOUNTER — APPOINTMENT (OUTPATIENT)
Dept: PHYSICAL THERAPY | Facility: HOSPITAL | Age: 46
End: 2021-04-21
Attending: NURSE PRACTITIONER
Payer: COMMERCIAL

## 2021-04-27 ENCOUNTER — OFFICE VISIT (OUTPATIENT)
Dept: PHYSICAL THERAPY | Facility: HOSPITAL | Age: 46
End: 2021-04-27
Attending: NURSE PRACTITIONER
Payer: COMMERCIAL

## 2021-04-27 PROCEDURE — 97140 MANUAL THERAPY 1/> REGIONS: CPT | Performed by: PHYSICAL THERAPIST

## 2021-04-27 NOTE — PROGRESS NOTES
Referring Physician: Sandy SHEETS  Diagnosis: Personal history of breast cancer (Z85.3)    Date of onset: 9/14/2020 Evaluation Date: 1/20/2021     PHYSICAL THERAPY NOTE:    Certification From: 8/97/3928      To: 6/21/2021            precautions: 4/20/2021 1/20/2021   LOCATION/MEASUREMENTS RUE RUE   MEASUREMENT A 15.3 15.5   MEASUREMENT B 17.8 18.6   MEASUREMENT C 21.4 22.1   MEASUREMENT D 23.9 24.8   MEASUREMENT E 24.8 24.9   MEASUREMENT F 24.3 25.8   MEASUREMENT G 26.6 28.3   MEASUREMENT H 29.2 3 visual cording, but palpable cording not L inner/upper arm in abducted position, tissue tightness palpable mid upper/inner arm and at base of thumb    1/26/2021:  Mild cording left axilla, only palpable w/ skin stretch while in abduction  + neural tension There Ex (8 minutes)  -side lying L shoulder abduction x 10  -side lying L shoulder wind pereyra each direction x 10  -standing OH flexion and abduction x 10 (concentrating on getting elbow fully extended.   Use mirror for visual feed back as needed.)  -hands

## 2021-05-03 ENCOUNTER — OFFICE VISIT (OUTPATIENT)
Dept: PHYSICAL THERAPY | Facility: HOSPITAL | Age: 46
End: 2021-05-03
Attending: NURSE PRACTITIONER
Payer: COMMERCIAL

## 2021-05-03 PROCEDURE — 97140 MANUAL THERAPY 1/> REGIONS: CPT | Performed by: PHYSICAL THERAPIST

## 2021-05-03 NOTE — PROGRESS NOTES
Referring Physician: Sandy SHEETS  Diagnosis: Personal history of breast cancer (Z85.3)    Date of onset: 9/14/2020 Evaluation Date: 1/20/2021     PHYSICAL THERAPY NOTE:    Certification From: 1/59/0137      To: 6/21/2021            precautions: 17. 8   MEASUREMENT C 20.2 21.3   MEASUREMENT D 23 23.8   MEASUREMENT E 24.6 25.5   MEASUREMENT F 25.1 26.2   MEASUREMENT G 25.3 29.1   MEASUREMENT H 28.3 31.6   MEASUREMENT I 31.1 33.8    TOTAL VOLUME  [id number]   DATE MEASURED 4/20/2021 1/20/2 WFL, strength 5/5 but pt reports feeling weak in arm     2/15/2021:  Cerv spine screen: ROM WFL, no symptoms w/ repeated motions  L shoulder AROM WFL, strength 5/5  Mild swelling L breast     2/1/2021:  AROM L shld WFL, strength 5/5  No visual cording, but (40 min)    MLD as previously outline.   Reviewed self MLD- pt attempting but not able to achieve same symptom relief    Compression:  - pt wearing compression sleeve   - pt attempted to wear lumpectomy compression pad but it was too painful  Manual Therapy CDT once a week  Cont as needed for strengthening and STM     Treatment will include: Complete Decongestive Therapy: Manual Therapy, Self-Care/Home Management Training, Therapeutic Exercise, Neuromuscular Re-education, Orthotic Management and Training

## 2021-05-10 ENCOUNTER — HOSPITAL ENCOUNTER (OUTPATIENT)
Dept: MAMMOGRAPHY | Facility: HOSPITAL | Age: 46
Discharge: HOME | End: 2021-05-10
Attending: INTERNAL MEDICINE
Payer: COMMERCIAL

## 2021-05-10 DIAGNOSIS — Z85.3 PERSONAL HISTORY OF BREAST CANCER: ICD-10-CM

## 2021-05-10 DIAGNOSIS — C50.812 MALIGNANT NEOPLASM OF OVERLAPPING SITES OF LEFT BREAST IN FEMALE, ESTROGEN RECEPTOR POSITIVE (HCC): ICD-10-CM

## 2021-05-10 DIAGNOSIS — Z17.0 MALIGNANT NEOPLASM OF OVERLAPPING SITES OF LEFT BREAST IN FEMALE, ESTROGEN RECEPTOR POSITIVE (HCC): ICD-10-CM

## 2021-05-10 PROCEDURE — 77065 DX MAMMO INCL CAD UNI: CPT | Performed by: INTERNAL MEDICINE

## 2021-05-10 PROCEDURE — 77061 BREAST TOMOSYNTHESIS UNI: CPT | Performed by: INTERNAL MEDICINE

## 2021-05-11 ENCOUNTER — OFFICE VISIT (OUTPATIENT)
Dept: PHYSICAL THERAPY | Facility: HOSPITAL | Age: 46
End: 2021-05-11
Attending: NURSE PRACTITIONER
Payer: COMMERCIAL

## 2021-05-11 PROCEDURE — 97140 MANUAL THERAPY 1/> REGIONS: CPT | Performed by: PHYSICAL THERAPIST

## 2021-05-11 NOTE — PROGRESS NOTES
Referring Physician: Sandy SHEETS  Diagnosis: Personal history of breast cancer (Z85.3)    Date of onset: 9/14/2020 Evaluation Date: 1/20/2021     PHYSICAL THERAPY NOTE:    Certification From: 3/14/8424      To: 6/21/2021            precautions: from 3rd cuticle   RIGHT HAND VOLUME 19.6 across palm ( 19.6 across palm (   LEFT HAND VOLUME 19.7 19.7   MEASUREMENT A 15.1 15.4   MEASUREMENT B 16.8 17.8   MEASUREMENT C 20.2 21.3   MEASUREMENT D 23 23.8   MEASUREMENT E 24.6 25.5   MEASUREMENT F 25.1 26. infection)  - small scratch in left axilla      2/23/2021:  Cording L axilla (does not restrict ROM, only visible w/ end range stretching  L shld AROM WFL, strength 5/5 but pt reports feeling weak in arm     2/15/2021:  Cerv spine screen: ROM WFL, no sympt Ribs: 86 cm   Chest:  101 cm Manual Therapy (40 min)    MLD: Neck sequence, abd sequence, R axilla, A-A (ant and post), L inguinal, L A-I (supine and sidely), L breast, LUE.  Significant time spent on left lateral breast.     Compression:  - pt wearing

## 2021-05-18 ENCOUNTER — OFFICE VISIT (OUTPATIENT)
Dept: PHYSICAL THERAPY | Facility: HOSPITAL | Age: 46
End: 2021-05-18
Attending: NURSE PRACTITIONER
Payer: COMMERCIAL

## 2021-05-18 PROCEDURE — 97140 MANUAL THERAPY 1/> REGIONS: CPT | Performed by: PHYSICAL THERAPIST

## 2021-05-18 NOTE — PROGRESS NOTES
Referring Physician: Sandy SHEETS  Diagnosis: Personal history of breast cancer (Z85.3)    Date of onset: 9/14/2020 Evaluation Date: 1/20/2021     PHYSICAL THERAPY NOTE:    Certification From: 6/06/3358      To: 6/21/2021            precautions: Calculations 4/20/2021 1/20/2021   DATE MEASURED 4/20/2021 1/20/2021   LOCATION/MEASUREMENTS LUE LUE   PATIENT POSITION  supine supine   LIMB POSITION 75 degrees abd  75 degrees abd    STARTING POINT 16 cm from 3rd cuticle 16 cm from 3rd cuticle   RIGHT HA nipple irritation noted, dry  - mild cording L axilla, extends into mid arm (does not restrict ROM, but is visible w/ end range stretching)   - slight redness L axilla (instr pt to monitor redness, if worsens to contact MD to r/o infection)  - small scratc and L A-I       Compression:    Discussed getting compression bra. Discussed [employer]isse and the Shefit Bra. Measured for the [facility], sent request to vendor ([employer]).  Shefit bra sizing 1Luxe (if pt wants to purchase on her own)     Ribs: 86 cm   C daily,  wear compression garments as needed  Cont w/ CDT once a week  Cont as needed for strengthening and STM     Treatment will include: Complete Decongestive Therapy: Manual Therapy, Self-Care/Home Management Training, Therapeutic Exercise, Neuromuscula

## 2021-05-24 ENCOUNTER — OFFICE VISIT (OUTPATIENT)
Dept: HEMATOLOGY/ONCOLOGY | Facility: HOSPITAL | Age: 46
End: 2021-05-24
Attending: NURSE PRACTITIONER
Payer: COMMERCIAL

## 2021-05-24 VITALS
DIASTOLIC BLOOD PRESSURE: 90 MMHG | RESPIRATION RATE: 16 BRPM | OXYGEN SATURATION: 100 % | SYSTOLIC BLOOD PRESSURE: 125 MMHG | WEIGHT: 150 LBS | TEMPERATURE: 99 F | BODY MASS INDEX: 26.58 KG/M2 | HEIGHT: 62.99 IN | HEART RATE: 66 BPM

## 2021-05-24 DIAGNOSIS — C50.812 MALIGNANT NEOPLASM OF OVERLAPPING SITES OF LEFT BREAST IN FEMALE, ESTROGEN RECEPTOR POSITIVE (HCC): Primary | ICD-10-CM

## 2021-05-24 DIAGNOSIS — Z17.0 MALIGNANT NEOPLASM OF OVERLAPPING SITES OF LEFT BREAST IN FEMALE, ESTROGEN RECEPTOR POSITIVE (HCC): Primary | ICD-10-CM

## 2021-05-24 PROCEDURE — 99214 OFFICE O/P EST MOD 30 MIN: CPT | Performed by: NURSE PRACTITIONER

## 2021-05-24 NOTE — PROGRESS NOTES
TANI       Radha Crowder is a 50year old female who is here today for follow-up diagnosis of Malignant neoplasm of overlapping sites of left breast in female, estrogen receptor positive (hcc)  (primary encounter diagnosis)       Patient complete 30 MG Oral Tab Take 1 tablet by mouth 2 (two) times daily. 60 tablet 11   • Cholecalciferol (VITAMIN D3) 25 MCG (1000 UT) Oral Cap Take 1 tablet by mouth daily. • losartan Potassium 50 MG Oral Tab Take 1 tablet (50 mg total) by mouth daily.  90 tablet 1 Use      Vaping Use: Never assessed    Alcohol use: No      Alcohol/week: 0.0 standard drinks      Comment: rarely    Drug use: No      Comment: none      Family History   Problem Relation Age of Onset   • Colon Cancer Father 72        Cause of death   • S old female with ER/WA positive breast cancer, node negative. Patient completed local control of treatment with lumpectomy and radiation therapy.     Oncotype score of 20, which per data from [facility] trial the risk of distant recurrence at 9 years with 5 follow-ups on file. No orders of the defined types were placed in this encounter.       PROCEDURE: [facility] BONNIE 2D+3D DIAGNOSTIC HARMONY LEFT (CPT=77065/11774)       COMPARISON: [facility], [facility]. for [facility], [facility] BONNIE 2D+3D DIAGNOSTIC ADDL VWS  LEFT (CPT=770 and/or genetic testing.  Providers are encouraged to contact our breast navigator, Melanie oClin, at [phone] with any questions or for guidance regarding this   recommendation.           PLEASE NOTE: NORMAL MAMMOGRAM DOES NOT EXCLUDE THE POSSIBILIT

## 2021-05-25 ENCOUNTER — OFFICE VISIT (OUTPATIENT)
Dept: PHYSICAL THERAPY | Facility: HOSPITAL | Age: 46
End: 2021-05-25
Attending: NURSE PRACTITIONER
Payer: COMMERCIAL

## 2021-05-25 PROCEDURE — 97110 THERAPEUTIC EXERCISES: CPT | Performed by: PHYSICAL THERAPIST

## 2021-05-25 PROCEDURE — 97140 MANUAL THERAPY 1/> REGIONS: CPT | Performed by: PHYSICAL THERAPIST

## 2021-05-25 NOTE — PROGRESS NOTES
Referring Physician: Sandy SHEETS  Diagnosis: Personal history of breast cancer (Z85.3)    Date of onset: 9/14/2020 Evaluation Date: 1/20/2021     PHYSICAL THERAPY NOTE:    Certification From: 5/60/2603      To: 6/21/2021            precautions: - swelling L trunk and axilla  - slight swelling ant left wrist (area of sleeve and gauntlet), otherwise LUE smaller than RUE  - Shoulder AROM: [place] [place]  - Shoulder strength 5/5  Lymphedema Calculations 4/20/2021 1/20/2021   DATE MEASURED 4/20/2021 1/20/2021 breast (+ pitting)    Circumference (7 cm from nipple): L: 35.9,   R: 34.5    3/1/2021:  - swelling L breast (+ pitting)    Circumference (7 cm from nipple): L: 36.6,   R: 34.5  - left nipple irritation noted, dry  - mild cording L axilla, extends into mid min)    MLD: Neck sequence, abd sequence, R axilla, A-A (ant and post), L inguinal, L A-I (supine and sidely), L breast, LUE.  Significant time spent on left breast.     Kinesiotaping for breast swelling: A-A and L A-I       Compression:    Discussed divina Patient would benefit from higher compression garment (custom flat knit) and home pump       Goals:     To be met in 10 visits:  1) Pt to be independent with self MLD, ROM exercises, and donning/doffing compression bandages/garments to facilitate swelling r

## 2021-05-26 ENCOUNTER — TELEPHONE (OUTPATIENT)
Dept: HEMATOLOGY/ONCOLOGY | Facility: HOSPITAL | Age: 46
End: 2021-05-26

## 2021-05-26 RX ORDER — MONTELUKAST SODIUM 10 MG/1
TABLET ORAL
Qty: 30 TABLET | Refills: 0 | Status: SHIPPED | OUTPATIENT
Start: 2021-05-26 | End: 2021-07-24

## 2021-05-26 NOTE — TELEPHONE ENCOUNTER
Judi Goldmann from [employer] called to let us know about the appeal regarding: The durable medical equipment which is  a Compression device from [facility] - that appeal [employer]. Will be getting a letter in 10 days explaining that decision.     Letter wi

## 2021-05-26 NOTE — TELEPHONE ENCOUNTER
Received refill request for Singulair 10 mg- 1 tablet  by mouth daily, #90. LOV 6/10/20 for allergies. To follow up in 1 year. No appointment scheduled as of today,5/26/21. Refill pended and forwarded to Dr. Sparkle Lilly for further directions.

## 2021-05-27 NOTE — TELEPHONE ENCOUNTER
Patient verbalizes her understanding, and will go on aroundthewayhart to find an appointment that works with her schedule.

## 2021-05-31 RX ORDER — LOSARTAN POTASSIUM 50 MG/1
TABLET ORAL
Qty: 90 TABLET | Refills: 3 | Status: SHIPPED | OUTPATIENT
Start: 2021-05-31

## 2021-06-02 ENCOUNTER — OFFICE VISIT (OUTPATIENT)
Dept: PHYSICAL THERAPY | Facility: HOSPITAL | Age: 46
End: 2021-06-02
Attending: NURSE PRACTITIONER
Payer: COMMERCIAL

## 2021-06-02 PROCEDURE — 97140 MANUAL THERAPY 1/> REGIONS: CPT | Performed by: PHYSICAL THERAPIST

## 2021-06-02 NOTE — PROGRESS NOTES
Referring Physician: Sandy SHEETS  Diagnosis: Personal history of breast cancer (Z85.3)    Date of onset: 9/14/2020 Evaluation Date: 1/20/2021     PHYSICAL THERAPY NOTE:    Certification From: 7/40/7890      To: 6/21/2021            precautions: 15. 5   MEASUREMENT B 17.8 17.8 18.6   MEASUREMENT C 20.3 21.4 22.1   MEASUREMENT D 23.6 23.9 24.8   MEASUREMENT E 24.8 24.8 24.9   MEASUREMENT F 24.3 24.3 25.8   MEASUREMENT G 25.8 26.6 28.3   MEASUREMENT H 28.5 29.2 31.5   MEASUREMENT I 30.4 31.9 34.6 3rd cuticle   RIGHT HAND VOLUME 19.6 across palm ( 19.6 across palm (   LEFT HAND VOLUME 19.7 19.7   MEASUREMENT A 15.1 15.4   MEASUREMENT B 16.8 17.8   MEASUREMENT C 20.2 21.3   MEASUREMENT D 23 23.8   MEASUREMENT E 24.6 25.5   MEASUREMENT F 25.1 26.2   M infection)  - small scratch in left axilla      2/23/2021:  Cording L axilla (does not restrict ROM, only visible w/ end range stretching  L shld AROM WFL, strength 5/5 but pt reports feeling weak in arm     2/15/2021:  Cerv spine screen: ROM WFL, no sympt pt awaiting vendor to contact to schedule fitting for custom sleeve  Manual Therapy (50 min)    Measurements    MLD: Neck sequence, abd sequence, R axilla, A-A (ant and post), L inguinal, L A-I (supine and sidely), L breast, LUE    STM L wrist ext and late

## 2021-06-09 ENCOUNTER — APPOINTMENT (OUTPATIENT)
Dept: PHYSICAL THERAPY | Facility: HOSPITAL | Age: 46
End: 2021-06-09
Attending: NURSE PRACTITIONER
Payer: COMMERCIAL

## 2021-06-15 ENCOUNTER — APPOINTMENT (OUTPATIENT)
Dept: PHYSICAL THERAPY | Facility: HOSPITAL | Age: 46
End: 2021-06-15
Attending: NURSE PRACTITIONER
Payer: COMMERCIAL

## 2021-06-17 ENCOUNTER — APPOINTMENT (OUTPATIENT)
Dept: PHYSICAL THERAPY | Facility: HOSPITAL | Age: 46
End: 2021-06-17
Attending: NURSE PRACTITIONER
Payer: COMMERCIAL

## 2021-06-18 ENCOUNTER — APPOINTMENT (OUTPATIENT)
Dept: PHYSICAL THERAPY | Facility: HOSPITAL | Age: 46
End: 2021-06-18
Attending: NURSE PRACTITIONER
Payer: COMMERCIAL

## 2021-06-24 ENCOUNTER — APPOINTMENT (OUTPATIENT)
Dept: PHYSICAL THERAPY | Facility: HOSPITAL | Age: 46
End: 2021-06-24
Attending: NURSE PRACTITIONER
Payer: COMMERCIAL

## 2021-06-28 ENCOUNTER — OFFICE VISIT (OUTPATIENT)
Dept: PHYSICAL THERAPY | Facility: HOSPITAL | Age: 46
End: 2021-06-28
Attending: NURSE PRACTITIONER
Payer: COMMERCIAL

## 2021-06-28 ENCOUNTER — OFFICE VISIT (OUTPATIENT)
Dept: SURGERY | Facility: CLINIC | Age: 46
End: 2021-06-28
Payer: COMMERCIAL

## 2021-06-28 VITALS — BODY MASS INDEX: 26.58 KG/M2 | WEIGHT: 150 LBS | HEIGHT: 63 IN

## 2021-06-28 DIAGNOSIS — I89.0 LYMPHEDEMA OF BREAST: Primary | ICD-10-CM

## 2021-06-28 PROCEDURE — 99214 OFFICE O/P EST MOD 30 MIN: CPT | Performed by: SURGERY

## 2021-06-28 PROCEDURE — 97140 MANUAL THERAPY 1/> REGIONS: CPT | Performed by: PHYSICAL THERAPIST

## 2021-06-28 PROCEDURE — 3008F BODY MASS INDEX DOCD: CPT | Performed by: SURGERY

## 2021-06-28 NOTE — H&P
S:  Riley Leon is a 50year old female sp lumpectomy, SN, Radiation  Doing well, no fevers, no chills, tolerating a general diet, generally normal bowel movements, no calf tenderness nor lower extremity edema, no shortness of breath, no chest p

## 2021-06-28 NOTE — PROGRESS NOTES
Referring Physician: Sandy SHEETS  Diagnosis: Personal history of breast cancer (Z85.3)    Date of onset: 9/14/2020 Evaluation Date: 1/20/2021     PHYSICAL THERAPY NOTE:    Discharge Summary    Pt has attended 20 visits in Physical Therapy. 9/11/2018   • Psoriasis          Pain:  \"2\"/10 L breast and arm    Subjective: \"It's the same\"      Objective:    6/28/2021:    - L breast swelling, + Pitting  - Circumference (7 cm from nipple): L: 36.8 ,   R: 34.5 (L breast swelling has increased)  - 34.5 (no change)  - no visual swelling of LUE, but tenderness L wrist ext and lateral epicondyle noted  Lymphedema Calculations 6/2/2021 4/20/2021 1/20/2021   DATE MEASURED 6/2/2021 4/20/2021 1/20/2021   LOCATION/MEASUREMENTS LUE LUE LUE   PATIENT POSITION nipple): L: 34.9,   R: 34.5 (improved)      5/3/2021:  - swelling L breast, mostly medially, slight pitting    - Circumference (7 cm from nipple): L: 35.5,   R: 34.5 (slight increase L breast swelling)   - pt recently traveled, returned 2 days ago (flying) tightness/pulling LUE with OH flexion and abduction  4/6/2021:   - swelling L breast, mostly medially    Circumference (7 cm from nipple): L: 34.8,   R: 34.5  - mild swelling L trunk       3/23/2021:  C-spine screening (repeated):  AROM WFL, cannot reproduc AROM:    Flex: 165 B    Abd: 170 B (slight \"pulling\" on left)    Shoulder strength: 5/5 B  Posture:  Good   Edema/Tissue Observations:    - mild swelling L breast (medially)  - mild swelling L axilla w/ decreased tissue mobility  - left axilla incision t reps each                         Assessment: refer to Discharge note      Goals:     To be met in 10 visits:  1) Pt to be independent with self MLD, ROM exercises, and donning/doffing compression bandages/garments to facilitate swelling reduction and to be

## 2021-07-12 ENCOUNTER — TELEPHONE (OUTPATIENT)
Dept: DERMATOLOGY CLINIC | Facility: CLINIC | Age: 46
End: 2021-07-12

## 2021-07-12 NOTE — TELEPHONE ENCOUNTER
Fax from [address] started for Apremilast (OTEZLA) 30 MG Oral Tab    KEY: [id number]    lov 1/15/21

## 2021-07-14 NOTE — TELEPHONE ENCOUNTER
Fax from [employer]    Approved for Otezla 30mg tablet    From 6/13/21-7/13/22    Placed fax in pa inbox

## 2021-07-16 ENCOUNTER — TELEPHONE (OUTPATIENT)
Dept: INTERNAL MEDICINE CLINIC | Facility: CLINIC | Age: 46
End: 2021-07-16

## 2021-07-16 NOTE — TELEPHONE ENCOUNTER
Pt sent [employer] message with [employer] forms needing to be completed. Logged for processing. Sent [employer] messge for missing hipaa for [employer]. **charting here to avoid signature on 7/30/21 message. Diego segura received in forms. Archived [employer] forms.  7/30/21**

## 2021-07-23 NOTE — TELEPHONE ENCOUNTER
Dr. Judy Garcia,    Pt is requesting intermittent FMLA due to lymphedema:  1-2 flare ups per month and 1-5 appts per month. Do you support?     Thank you,  Amina Monge

## 2021-07-24 ENCOUNTER — OFFICE VISIT (OUTPATIENT)
Dept: ALLERGY | Facility: CLINIC | Age: 46
End: 2021-07-24
Payer: COMMERCIAL

## 2021-07-24 ENCOUNTER — NURSE ONLY (OUTPATIENT)
Dept: ALLERGY | Facility: CLINIC | Age: 46
End: 2021-07-24
Payer: COMMERCIAL

## 2021-07-24 VITALS
BODY MASS INDEX: 26.58 KG/M2 | HEIGHT: 63 IN | SYSTOLIC BLOOD PRESSURE: 109 MMHG | DIASTOLIC BLOOD PRESSURE: 76 MMHG | OXYGEN SATURATION: 97 % | WEIGHT: 150 LBS | HEART RATE: 78 BPM

## 2021-07-24 DIAGNOSIS — J30.9 ALLERGIC RHINITIS, UNSPECIFIED SEASONALITY, UNSPECIFIED TRIGGER: ICD-10-CM

## 2021-07-24 DIAGNOSIS — Z92.29 COVID-19 VACCINE SERIES COMPLETED: ICD-10-CM

## 2021-07-24 DIAGNOSIS — J32.9 CHRONIC SINUSITIS, UNSPECIFIED LOCATION: Primary | ICD-10-CM

## 2021-07-24 DIAGNOSIS — J32.9 CHRONIC SINUSITIS, UNSPECIFIED LOCATION: ICD-10-CM

## 2021-07-24 DIAGNOSIS — J30.89 ENVIRONMENTAL AND SEASONAL ALLERGIES: ICD-10-CM

## 2021-07-24 PROCEDURE — 3008F BODY MASS INDEX DOCD: CPT | Performed by: ALLERGY & IMMUNOLOGY

## 2021-07-24 PROCEDURE — 95004 PERQ TESTS W/ALRGNC XTRCS: CPT | Performed by: ALLERGY & IMMUNOLOGY

## 2021-07-24 PROCEDURE — 95024 IQ TESTS W/ALLERGENIC XTRCS: CPT | Performed by: ALLERGY & IMMUNOLOGY

## 2021-07-24 PROCEDURE — 99214 OFFICE O/P EST MOD 30 MIN: CPT | Performed by: ALLERGY & IMMUNOLOGY

## 2021-07-24 PROCEDURE — 3074F SYST BP LT 130 MM HG: CPT | Performed by: ALLERGY & IMMUNOLOGY

## 2021-07-24 PROCEDURE — 3078F DIAST BP <80 MM HG: CPT | Performed by: ALLERGY & IMMUNOLOGY

## 2021-07-24 RX ORDER — MONTELUKAST SODIUM 10 MG/1
10 TABLET ORAL NIGHTLY
Qty: 90 TABLET | Refills: 2 | Status: SHIPPED | OUTPATIENT
Start: 2021-07-24

## 2021-07-24 RX ORDER — MOMETASONE 50 UG/1
2 SPRAY, METERED NASAL 2 TIMES DAILY
Qty: 6 EACH | Refills: 2 | Status: SHIPPED | OUTPATIENT
Start: 2021-07-24

## 2021-07-24 NOTE — PATIENT INSTRUCTIONS
1.  Chronic sinusitis  Still with room for improvement. Patient would consider potential sinus surgery and/or immunotherapy. See above skin testing to environmental allergens and screen for triggers.   Patient to contact my office if interested in [address]

## 2021-07-24 NOTE — PROGRESS NOTES
Leeanna Wright is a 50year old female. HPI:   Patient presents with:   Allergies: patient presents for yearly allergy check up, has some nasal stuffiness, sneezing more, denies sinus pressure, denies fever    Patient is a 53-year-old female who REPAIR OF NASAL SEPTUM  6/23/2016      Family History   Problem Relation Age of Onset   • Colon Cancer Father 72        Cause of death   • Stroke Mother         Cause of death   • Hypertension Mother    • Diabetes Maternal Grandmother    • Hypertension Sis Azelastine HCl 0.1 % Nasal Solution 2 sprays by Nasal route 2 (two) times daily.  90 mL 0       Allergies:  No Known Allergies      ROS:   Allergic/Immuno:  See hpi  Cardiovascular:  Negative for irregular heartbeat/palpitations, chest pain, edema  Constitu for triggers. Patient to contact my office if interested in pursuing immunotherapy  Check CT of sinuses      2.   Allergic rhinitis  Reviewed avoidance measures and potential treatment option of immunotherapy  Qnasl no longer covered therefore will try Andrade

## 2021-07-26 ENCOUNTER — TELEPHONE (OUTPATIENT)
Dept: ALLERGY | Facility: CLINIC | Age: 46
End: 2021-07-26

## 2021-07-26 RX ORDER — FLUTICASONE PROPIONATE 50 MCG
2 SPRAY, SUSPENSION (ML) NASAL DAILY
Qty: 3 EACH | Refills: 1 | Status: SHIPPED | OUTPATIENT
Start: 2021-07-26

## 2021-07-26 NOTE — TELEPHONE ENCOUNTER
Fax received from [facility] that Nasonex is not covered by insurance. Covered alternative is Flonase Nasal Spray 16 g.     Please advise

## 2021-07-26 NOTE — TELEPHONE ENCOUNTER
Spoke to patient. Informed her of the change in medication. She is willing to try flonase. Will keep us updated if it does not control her symptoms.

## 2021-07-27 ENCOUNTER — HOSPITAL ENCOUNTER (OUTPATIENT)
Dept: CT IMAGING | Age: 46
Discharge: HOME | End: 2021-07-27
Attending: ALLERGY & IMMUNOLOGY
Payer: COMMERCIAL

## 2021-07-27 DIAGNOSIS — J32.9 CHRONIC SINUSITIS, UNSPECIFIED LOCATION: ICD-10-CM

## 2021-07-27 PROCEDURE — 70486 CT MAXILLOFACIAL W/O DYE: CPT | Performed by: ALLERGY & IMMUNOLOGY

## 2021-07-28 ENCOUNTER — TELEPHONE (OUTPATIENT)
Dept: ALLERGY | Facility: CLINIC | Age: 46
End: 2021-07-28

## 2021-07-28 NOTE — TELEPHONE ENCOUNTER
----- Message from Antoni Davidson MD sent at 7/28/2021  9:24 AM CDT -----   Please call patient with recent CT of sinuses. Per radiology report no significant paranasal sinus disease present. There is a mild nasal septal deviation to the left.   No sign

## 2021-07-29 NOTE — TELEPHONE ENCOUNTER
LMTCB - forms pt submitted through [employer] are not FMLA. LM informing pt to please submit correct forms.

## 2021-07-30 ENCOUNTER — TELEPHONE (OUTPATIENT)
Dept: ALLERGY | Facility: CLINIC | Age: 46
End: 2021-07-30

## 2021-07-30 NOTE — TELEPHONE ENCOUNTER
Annabelle from pharmacy stated medication is not covered through insurance. They are requesting to change it to Fluticasone MS nasal spray. Please advise.     Mometasone Furoate (NASONEX) 50 MCG/ACT Nasal Suspension 6 each 2 2021    Si sprays b

## 2021-07-30 NOTE — TELEPHONE ENCOUNTER
Dr. Alejo Mendiola,     Please sign off on form: FMLA   -Highlight the patient and hit \"Chart\" button.   -In Chart Review, w/in the Encounter tab - click 1 time on the Telephone call encounter for 7/16/21 Scroll down the telephone encounter.  -Click \"scan on\"

## 2021-08-02 RX ORDER — FLUTICASONE PROPIONATE 50 MCG
2 SPRAY, SUSPENSION (ML) NASAL DAILY
Qty: 3 EACH | Refills: 0 | Status: SHIPPED | OUTPATIENT
Start: 2021-08-02 | End: 2021-08-12

## 2021-08-02 NOTE — TELEPHONE ENCOUNTER
Received communication from patient's pharmacy stating Nasonex nasal spray is not covered and to please change to Flonase nasal spray- 2 sprays each nostril daily. Medication pended and forwarded to Dr. Suri Haro for further directions. LOV 7/24/21.

## 2021-08-02 NOTE — TELEPHONE ENCOUNTER
ERIN completed and [facility].  Sent pt [employer] message

## 2021-08-03 NOTE — TELEPHONE ENCOUNTER
RN called pt to let her know that Dr. Canales Less sent over fluticasone nasal spray in place of the Nasonex as her insurance will not cover Nasonex. Pt verbalizes understanding and has no further questions.    RN notified her that Nasonex is also available ove

## 2021-08-09 NOTE — TELEPHONE ENCOUNTER
Anastasiya Calderón from [address] [place] [address] called with questions in regards to [facility] forms. Revised forms faxed at [phone], confirmation received.

## 2021-08-11 RX ORDER — BECLOMETHASONE DIPROPIONATE 80 UG/1
AEROSOL, METERED NASAL
Qty: 31.8 G | Refills: 3 | OUTPATIENT
Start: 2021-08-11

## 2021-08-12 ENCOUNTER — OFFICE VISIT (OUTPATIENT)
Dept: OBGYN CLINIC | Facility: CLINIC | Age: 46
End: 2021-08-12
Payer: COMMERCIAL

## 2021-08-12 VITALS
BODY MASS INDEX: 26 KG/M2 | WEIGHT: 148.19 LBS | SYSTOLIC BLOOD PRESSURE: 113 MMHG | DIASTOLIC BLOOD PRESSURE: 77 MMHG | HEART RATE: 73 BPM

## 2021-08-12 DIAGNOSIS — Z85.3 HISTORY OF BREAST CANCER: ICD-10-CM

## 2021-08-12 DIAGNOSIS — Z12.4 SCREENING FOR MALIGNANT NEOPLASM OF CERVIX: ICD-10-CM

## 2021-08-12 DIAGNOSIS — Z01.419 ENCOUNTER FOR GYNECOLOGICAL EXAMINATION: Primary | ICD-10-CM

## 2021-08-12 PROCEDURE — 99396 PREV VISIT EST AGE 40-64: CPT | Performed by: OBSTETRICS & GYNECOLOGY

## 2021-08-12 PROCEDURE — 3078F DIAST BP <80 MM HG: CPT | Performed by: OBSTETRICS & GYNECOLOGY

## 2021-08-12 PROCEDURE — 3074F SYST BP LT 130 MM HG: CPT | Performed by: OBSTETRICS & GYNECOLOGY

## 2021-08-13 LAB — HPV I/H RISK 1 DNA SPEC QL NAA+PROBE: NEGATIVE

## 2021-08-13 NOTE — PROGRESS NOTES
Donn Shin is a 50year old female [facility] Patient's last menstrual period was 07/29/2021. here for annual exam.       Last seen 7/25/2020. Last pap 2/2018 normal with neg HPV. Diagnosed with left breast cancer in 9/2020.    Had lumpectomy • Diabetes Maternal Grandmother    • Hypertension Sister    • Hypertension Brother    • Breast Cancer Self 46   • Glaucoma Neg    • Macular degeneration Neg        MEDICATIONS:  Current Outpatient Medications   Medication Sig Dispense Refill   • Flutica dysuria, incontinence, abnormal vaginal discharge, vaginal itching  Musculoskeletal:  denies back pain. Skin/Breast:  Denies any breast pain, lumps, or discharge. Neurological:  denies headaches, extremity weakness or numbness.   Psychiatric: denies depr

## 2021-09-29 ENCOUNTER — TELEPHONE (OUTPATIENT)
Dept: HEMATOLOGY/ONCOLOGY | Facility: HOSPITAL | Age: 46
End: 2021-09-29

## 2021-09-29 NOTE — TELEPHONE ENCOUNTER
Insurance company peer reviewer MD called to have pump authorized. \"I'll see what I can do\", per MD after review of case.

## 2021-10-04 ENCOUNTER — NURSE TRIAGE (OUTPATIENT)
Dept: INTERNAL MEDICINE CLINIC | Facility: CLINIC | Age: 46
End: 2021-10-04

## 2021-10-04 NOTE — TELEPHONE ENCOUNTER
Please call Pt to triage. Thanks      ----- Message from ASHLEYΥΚΩBKΙGISEL Hackett sent at 10/4/2021  7:43 AM CDT -----  Regarding: Thumb Numbness  Hi Dr. Evangelista Jimenez:    I have been having numbness in my right thumb since Thursday.   It looks like you don’t have vidya

## 2021-10-05 ENCOUNTER — OFFICE VISIT (OUTPATIENT)
Dept: INTERNAL MEDICINE CLINIC | Facility: CLINIC | Age: 46
End: 2021-10-05
Payer: COMMERCIAL

## 2021-10-05 ENCOUNTER — TELEPHONE (OUTPATIENT)
Dept: INTERNAL MEDICINE CLINIC | Facility: CLINIC | Age: 46
End: 2021-10-05

## 2021-10-05 VITALS
HEIGHT: 63 IN | HEART RATE: 80 BPM | SYSTOLIC BLOOD PRESSURE: 138 MMHG | DIASTOLIC BLOOD PRESSURE: 87 MMHG | WEIGHT: 153 LBS | TEMPERATURE: 98 F | BODY MASS INDEX: 27.11 KG/M2

## 2021-10-05 DIAGNOSIS — M65.4 DE QUERVAIN'S DISEASE (RADIAL STYLOID TENOSYNOVITIS): Primary | ICD-10-CM

## 2021-10-05 PROCEDURE — 99214 OFFICE O/P EST MOD 30 MIN: CPT | Performed by: INTERNAL MEDICINE

## 2021-10-05 PROCEDURE — 3075F SYST BP GE 130 - 139MM HG: CPT | Performed by: INTERNAL MEDICINE

## 2021-10-05 PROCEDURE — 3008F BODY MASS INDEX DOCD: CPT | Performed by: INTERNAL MEDICINE

## 2021-10-05 PROCEDURE — 3079F DIAST BP 80-89 MM HG: CPT | Performed by: INTERNAL MEDICINE

## 2021-10-05 RX ORDER — METHYLPREDNISOLONE 4 MG/1
TABLET ORAL
Qty: 1 EACH | Refills: 0 | Status: SHIPPED | OUTPATIENT
Start: 2021-10-05

## 2021-10-05 NOTE — TELEPHONE ENCOUNTER
Diclofenac Sodium 1 % External Gel, Apply 2 g topically 4 (four) times daily. Apply thin layer to affected joint. , Disp: 100 g, Rfl: 3    Pharmacy note: Plan does not cover this medication.   Please call [phone] to initiate PA or call/fax pharmacy to anne-marie

## 2021-10-05 NOTE — PROGRESS NOTES
History of Present Illness   Patient ID: Gabriela Tanner is a 50year old female. Today's Date: 10/05/21  Chief Complaint: Numbness (R thumb)     Sandhya Estrada RN     DF    10/4/21 12:33 PM  Note  Action Requested: Summary for Provider      []? types all day, right handed, walks dogs. She has tried acetaminophen, ice and NSAIDs for the symptoms. The treatment provided no relief. Objective  Allergies: No Known Allergies  Review of Systems   Musculoskeletal: Positive for arthralgias.    Neurol Coloration: Skin is not jaundiced. Neurological:      General: No focal deficit present. Mental Status: She is alert and oriented to person, place, and time. Mental status is at baseline.    Psychiatric:         Mood and Affect: Mood normal. Assessment & Plan    1. De Quervain's disease (radial styloid tenosynovitis) (Primary)  -     methylPREDNISolone; As directed. Dispense: 1 each; Refill: 0  -     Diclofenac Sodium; Apply 2 g topically 4 (four) times daily.  Apply thin layer to affect Instructions     Please purchase a \"thumb–spica splint\". You can also start using topical diclofenac gel i.e. Voltaren gel. This is an orange tube over-the-counter in the back pain I will.   Continue to ice your wrist but use a barrier such as a thin c time. Don't put ice directly on the skin. · Your healthcare provider may put a splint on the thumb to hold it still. Use the splint as you have been instructed. In some cases, you may need to use a splint 24 hours a day for 4 to 6 weeks.  This will allow t better. Surgery  If other kinds of treatment don’t ease your pain, or if the pain is bad, you may need surgery. The sheath that surrounds the tendons is released so the tendons can move more easily. This helps reduce the inflammation.  It also allows you t of your thumb. You may feel pain when you pinch or grasp things, turn or touch your wrist, or make a fist. Your thumb may catch or make a crackling sound when you move it.    Treatment for de Quervain tenosynovitis   Treatments may include:  · Resting the w the hand  · New symptoms  Starla last reviewed this educational content on 12/1/2019  © 4315-9157 The [employer]. All rights reserved. This information is not intended as a substitute for professional medical care.  Always follow your healthcar occur:  · Redness over the painful area  · Increasing pain or swelling at the joint  · Fever lasting 24 to 48 hours or chills, or as advised by your healthcare provider  Starla last reviewed this educational content on 5/1/2018  © 4302-6902 The Starla The finger or thumb may lock or \"trigger\" suddenly. What causes tendonitis and tenosynovitis? The cause of tendonitis and tenosynovitis is often not known. They may be caused by strain, overuse, injury, or too much exercise.  They may also be linked to tenosynovitis is linked to tendonitis. This is the inflammation of the lining of the tendon sheath around a tendon. · Common types of tendon problems include rotator cuff tendonitis and trigger finger or trigger thumb.   · Tendonitis can be caused by Sarah Dan

## 2021-10-05 NOTE — PATIENT INSTRUCTIONS
Please purchase a \"thumb–spica splint\". You can also start using topical diclofenac gel i.e. Voltaren gel. This is an orange tube over-the-counter in the back pain I will. Continue to ice your wrist but use a barrier such as a thin cloth.   Avoid repe directly on the skin. · Your healthcare provider may put a splint on the thumb to hold it still. Use the splint as you have been instructed. In some cases, you may need to use a splint 24 hours a day for 4 to 6 weeks.  This will allow the wrist and thumb t other kinds of treatment don’t ease your pain, or if the pain is bad, you may need surgery. The sheath that surrounds the tendons is released so the tendons can move more easily. This helps reduce the inflammation.  It also allows you to straighten your thu feel pain when you pinch or grasp things, turn or touch your wrist, or make a fist. Your thumb may catch or make a crackling sound when you move it. Treatment for de Quervain tenosynovitis   Treatments may include:  · Resting the wrist and thumb.  This in symptoms  StayWell last reviewed this educational content on 12/1/2019  © 2735-5286 The [employer] [employer]. All rights reserved. This information is not intended as a substitute for professional medical care.  Always follow your healthcare professional's the painful area  · Increasing pain or swelling at the joint  · Fever lasting 24 to 48 hours or chills, or as advised by your healthcare provider  Starla last reviewed this educational content on 5/1/2018  © 3000-6597 The [employer]37.  All right lock or \"trigger\" suddenly. What causes tendonitis and tenosynovitis? The cause of tendonitis and tenosynovitis is often not known. They may be caused by strain, overuse, injury, or too much exercise.  They may also be linked to a disease such as diabet tendonitis. This is the inflammation of the lining of the tendon sheath around a tendon. · Common types of tendon problems include rotator cuff tendonitis and trigger finger or trigger thumb.   · Tendonitis can be caused by strain, overuse, injury, and too

## 2021-10-06 NOTE — TELEPHONE ENCOUNTER
Left message for patient to call back.  If she calls back please inform her she can purchase diclofenac gel otc

## 2021-11-02 DIAGNOSIS — Z17.0 MALIGNANT NEOPLASM OF OVERLAPPING SITES OF LEFT BREAST IN FEMALE, ESTROGEN RECEPTOR POSITIVE: ICD-10-CM

## 2021-11-02 DIAGNOSIS — C50.812 MALIGNANT NEOPLASM OF OVERLAPPING SITES OF LEFT BREAST IN FEMALE, ESTROGEN RECEPTOR POSITIVE: ICD-10-CM

## 2021-11-03 RX ORDER — TAMOXIFEN CITRATE 20 MG/1
20 TABLET ORAL DAILY
Qty: 90 TABLET | Refills: 3 | Status: SHIPPED | OUTPATIENT
Start: 2021-11-03 | End: 2022-10-31

## 2021-11-09 ENCOUNTER — HOSPITAL ENCOUNTER (OUTPATIENT)
Dept: MAMMOGRAPHY | Facility: HOSPITAL | Age: 46
Discharge: HOME | End: 2021-11-09
Attending: NURSE PRACTITIONER
Payer: COMMERCIAL

## 2021-11-09 DIAGNOSIS — C50.812 MALIGNANT NEOPLASM OF OVERLAPPING SITES OF LEFT BREAST IN FEMALE, ESTROGEN RECEPTOR POSITIVE (HCC): ICD-10-CM

## 2021-11-09 DIAGNOSIS — Z17.0 MALIGNANT NEOPLASM OF OVERLAPPING SITES OF LEFT BREAST IN FEMALE, ESTROGEN RECEPTOR POSITIVE (HCC): ICD-10-CM

## 2021-11-09 PROCEDURE — 77066 DX MAMMO INCL CAD BI: CPT | Performed by: NURSE PRACTITIONER

## 2021-11-09 PROCEDURE — 77062 BREAST TOMOSYNTHESIS BI: CPT | Performed by: NURSE PRACTITIONER

## 2021-11-12 ENCOUNTER — OFFICE VISIT (OUTPATIENT)
Dept: DERMATOLOGY CLINIC | Facility: CLINIC | Age: 46
End: 2021-11-12
Payer: COMMERCIAL

## 2021-11-12 DIAGNOSIS — D22.9 MULTIPLE NEVI: ICD-10-CM

## 2021-11-12 DIAGNOSIS — L40.9 PSORIASIS: Primary | ICD-10-CM

## 2021-11-12 DIAGNOSIS — Z51.81 MEDICATION MONITORING ENCOUNTER: ICD-10-CM

## 2021-11-12 DIAGNOSIS — D23.9 BENIGN NEOPLASM OF SKIN, UNSPECIFIED LOCATION: ICD-10-CM

## 2021-11-12 PROCEDURE — 99214 OFFICE O/P EST MOD 30 MIN: CPT | Performed by: DERMATOLOGY

## 2021-11-12 RX ORDER — APREMILAST 30 MG/1
1 TABLET, FILM COATED ORAL 2 TIMES DAILY
Qty: 60 TABLET | Refills: 5 | Status: SHIPPED | OUTPATIENT
Start: 2021-11-12

## 2021-11-12 RX ORDER — FLUOCINOLONE ACETONIDE 0.11 MG/ML
OIL TOPICAL
Qty: 120 ML | Refills: 3 | Status: SHIPPED | OUTPATIENT
Start: 2021-11-12

## 2021-11-15 ENCOUNTER — TELEPHONE (OUTPATIENT)
Dept: PHYSICAL THERAPY | Facility: HOSPITAL | Age: 46
End: 2021-11-15

## 2021-11-15 ENCOUNTER — OFFICE VISIT (OUTPATIENT)
Dept: HEMATOLOGY/ONCOLOGY | Facility: HOSPITAL | Age: 46
End: 2021-11-15
Attending: INTERNAL MEDICINE
Payer: COMMERCIAL

## 2021-11-15 VITALS
RESPIRATION RATE: 16 BRPM | TEMPERATURE: 99 F | HEIGHT: 63 IN | SYSTOLIC BLOOD PRESSURE: 120 MMHG | DIASTOLIC BLOOD PRESSURE: 80 MMHG | WEIGHT: 148 LBS | OXYGEN SATURATION: 98 % | HEART RATE: 91 BPM | BODY MASS INDEX: 26.22 KG/M2

## 2021-11-15 DIAGNOSIS — Z85.3 ENCOUNTER FOR FOLLOW-UP SURVEILLANCE OF BREAST CANCER: ICD-10-CM

## 2021-11-15 DIAGNOSIS — Z79.810 ENCOUNTER FOR MONITORING TAMOXIFEN THERAPY: ICD-10-CM

## 2021-11-15 DIAGNOSIS — C50.812 MALIGNANT NEOPLASM OF OVERLAPPING SITES OF LEFT BREAST IN FEMALE, ESTROGEN RECEPTOR POSITIVE (HCC): Primary | ICD-10-CM

## 2021-11-15 DIAGNOSIS — Z51.81 ENCOUNTER FOR MONITORING TAMOXIFEN THERAPY: ICD-10-CM

## 2021-11-15 DIAGNOSIS — Z17.0 MALIGNANT NEOPLASM OF OVERLAPPING SITES OF LEFT BREAST IN FEMALE, ESTROGEN RECEPTOR POSITIVE (HCC): Primary | ICD-10-CM

## 2021-11-15 DIAGNOSIS — Z08 ENCOUNTER FOR FOLLOW-UP SURVEILLANCE OF BREAST CANCER: ICD-10-CM

## 2021-11-15 PROCEDURE — 99214 OFFICE O/P EST MOD 30 MIN: CPT | Performed by: INTERNAL MEDICINE

## 2021-11-15 NOTE — PROGRESS NOTES
TANI       Joey Valderrama is a 48year old female who is here today for follow-up diagnosis of Malignant neoplasm of overlapping sites of left breast in female, estrogen receptor positive (hcc)  (primary encounter diagnosis)  Encounter for monitor Diclofenac Sodium 1 % External Gel Apply 2 g topically 4 (four) times daily. Apply thin layer to affected joint. 100 g 3   • Fluticasone Propionate (FLONASE) 50 MCG/ACT Nasal Suspension 2 sprays by Nasal route daily.  3 each 1   • Mometasone Furoate (NASONE RADIATION LEFT     • REMOVAL OF TONSILS,12+ Y/O  6/23/2016   • REPAIR OF NASAL SEPTUM  6/23/2016     Social History    Tobacco Use      Smoking status: Never Smoker      Smokeless tobacco: Never Used      Tobacco comment: Grew up with a smoker, no present positive (hcc)  (primary encounter diagnosis)  Encounter for monitoring tamoxifen therapy  Encounter for follow-up surveillance of breast cancer    Cancer Staging  Malignant neoplasm of overlapping sites of left breast in female, estrogen receptor positive supposed to take the AI at the same time every day and that the therapy will be for 5-10 yrs total.      Call if any issues arise. No follow-ups on file. All questions answered to the best of my abilities. Support provided to the patient.       [place] Breast marker legend used on images    Triangle = Palpable lump   Forkland = Skin tag or mole   BB = Nipple   Linear josey = Scar   Square = Pain            Dictated by (CST): Emir Wagner MD on 11/09/2021 at 2:47 PM       Finalized by (CST):  Manisha Turcios

## 2021-11-17 ENCOUNTER — APPOINTMENT (OUTPATIENT)
Dept: PHYSICAL THERAPY | Facility: HOSPITAL | Age: 46
End: 2021-11-17
Attending: INTERNAL MEDICINE
Payer: COMMERCIAL

## 2021-11-22 NOTE — PROGRESS NOTES
Quincy Benson is a 48year old female. HPI:     CC:  Patient presents with:  Psoriasis: LOV 1/15/21. pt presenting today with Psoriasis f/u. c/o flaking and itching to scalp and R elbow. pt states itching comes and goes.  pt currently taking [place] week as directed 120 mL 3   • Apremilast (OTEZLA) 30 MG Oral Tab Take 1 tablet by mouth 2 (two) times daily. 60 tablet 5   • tamoxifen 20 MG Oral Tab Take 1 tablet (20 mg total) by mouth daily.  90 tablet 3   • Montelukast Sodium 10 MG Oral Tab Take 1 table 9/11/2018    Procedure: COLONOSCOPY;  Surgeon: Rogers Wang MD;  [facility]: [facility]   • EXCISION TURBINATE,SUBMUCOUS  6/23/2016   • LUMPECTOMY LEFT  09/14/2020   • MOUTH SURGERY PROC UNLISTED  12/5/2011    Jaw surgery/metal implant   • NEED Strain: Not on file  Food Insecurity: Not on file  Transportation Needs: Not on file  Physical Activity: Not on file  Stress: Not on file  Social Connections: Not on file  Intimate Partner Violence: Not on file  Housing Stability: Not on file  Family Histo patient alert oriented in no acute distress. Exam total-body performed, including scalp, head, neck, face,nails, hair, external eyes, including conjunctival mucosa, eyelids, lips external ears, back, chest,/ breasts, axillae,  abdomen, arms, legs, palms. more persistent scattered lesions on trunk and extremities. Stable doing well nearly clear on Otezla topicals as needed will let us know she is a refill overall stable few patches over trunk and extremities. Scalp been pretty clear.   Uses topicals quite recognition.     Encounter Times  Including precharting, reviewing chart, prior notes obtaining history: 10 minutes, medical exam :10 minutes, notes on body map, plan, counseling 10minutes My total time spent caring for the patient on the day of the encount

## 2021-11-23 ENCOUNTER — APPOINTMENT (OUTPATIENT)
Dept: PHYSICAL THERAPY | Facility: HOSPITAL | Age: 46
End: 2021-11-23
Attending: INTERNAL MEDICINE
Payer: COMMERCIAL

## 2021-11-30 ENCOUNTER — APPOINTMENT (OUTPATIENT)
Dept: PHYSICAL THERAPY | Facility: HOSPITAL | Age: 46
End: 2021-11-30
Attending: INTERNAL MEDICINE
Payer: COMMERCIAL

## 2021-12-03 ENCOUNTER — APPOINTMENT (OUTPATIENT)
Dept: PHYSICAL THERAPY | Facility: HOSPITAL | Age: 46
End: 2021-12-03
Attending: INTERNAL MEDICINE
Payer: COMMERCIAL

## 2021-12-07 ENCOUNTER — APPOINTMENT (OUTPATIENT)
Dept: PHYSICAL THERAPY | Facility: HOSPITAL | Age: 46
End: 2021-12-07
Attending: INTERNAL MEDICINE
Payer: COMMERCIAL

## 2021-12-09 ENCOUNTER — APPOINTMENT (OUTPATIENT)
Dept: PHYSICAL THERAPY | Facility: HOSPITAL | Age: 46
End: 2021-12-09
Attending: INTERNAL MEDICINE
Payer: COMMERCIAL

## 2021-12-14 ENCOUNTER — APPOINTMENT (OUTPATIENT)
Dept: PHYSICAL THERAPY | Facility: HOSPITAL | Age: 46
End: 2021-12-14
Attending: INTERNAL MEDICINE
Payer: COMMERCIAL

## 2021-12-16 ENCOUNTER — APPOINTMENT (OUTPATIENT)
Dept: PHYSICAL THERAPY | Facility: HOSPITAL | Age: 46
End: 2021-12-16
Attending: INTERNAL MEDICINE
Payer: COMMERCIAL

## 2021-12-20 DIAGNOSIS — Z17.0 MALIGNANT NEOPLASM OF OVERLAPPING SITES OF LEFT BREAST IN FEMALE, ESTROGEN RECEPTOR POSITIVE (HCC): Primary | ICD-10-CM

## 2021-12-20 DIAGNOSIS — C50.812 MALIGNANT NEOPLASM OF OVERLAPPING SITES OF LEFT BREAST IN FEMALE, ESTROGEN RECEPTOR POSITIVE (HCC): Primary | ICD-10-CM

## 2021-12-20 DIAGNOSIS — I89.0 LYMPHEDEMA: ICD-10-CM

## 2021-12-21 ENCOUNTER — APPOINTMENT (OUTPATIENT)
Dept: PHYSICAL THERAPY | Facility: HOSPITAL | Age: 46
End: 2021-12-21
Attending: INTERNAL MEDICINE
Payer: COMMERCIAL

## 2021-12-23 ENCOUNTER — APPOINTMENT (OUTPATIENT)
Dept: PHYSICAL THERAPY | Facility: HOSPITAL | Age: 46
End: 2021-12-23
Attending: INTERNAL MEDICINE
Payer: COMMERCIAL

## 2021-12-23 ENCOUNTER — OFFICE VISIT (OUTPATIENT)
Dept: SURGERY | Facility: CLINIC | Age: 46
End: 2021-12-23
Payer: COMMERCIAL

## 2021-12-23 DIAGNOSIS — Z80.3 FAMILY HISTORY OF BREAST CANCER: ICD-10-CM

## 2021-12-23 DIAGNOSIS — I89.0 LYMPHEDEMA OF BREAST: Primary | ICD-10-CM

## 2021-12-23 PROCEDURE — 99214 OFFICE O/P EST MOD 30 MIN: CPT | Performed by: SURGERY

## 2021-12-23 NOTE — PROGRESS NOTES
S:  Pio Viveros is a 48year old female sp lumpectomy, SN, Radiation  Doing well, no fevers, no chills, tolerating a general diet, generally normal bowel movements, no calf tenderness nor lower extremity edema, no shortness of breath, no chest p

## 2021-12-27 ENCOUNTER — TELEPHONE (OUTPATIENT)
Dept: HEMATOLOGY/ONCOLOGY | Facility: HOSPITAL | Age: 46
End: 2021-12-27

## 2021-12-29 ENCOUNTER — PATIENT MESSAGE (OUTPATIENT)
Dept: ADMINISTRATIVE | Age: 46
End: 2021-12-29

## 2021-12-29 ENCOUNTER — TELEPHONE (OUTPATIENT)
Dept: INTERNAL MEDICINE CLINIC | Facility: CLINIC | Age: 46
End: 2021-12-29

## 2021-12-29 NOTE — TELEPHONE ENCOUNTER
FMLA forms received in forms department, logged for processing, sent pt a [employer] message for missing HIPAA for [facility]. Recert forms all information will stay the same per pt.

## 2021-12-29 NOTE — TELEPHONE ENCOUNTER
Returned pt's call, pt requesting email address and fax number where she can submit recert FMLA forms, provided pt with forms department email address and fax number and advised pt of current turnaround time, pt verbalized understanding.

## 2022-01-06 ENCOUNTER — OFFICE VISIT (OUTPATIENT)
Dept: INTERNAL MEDICINE CLINIC | Facility: CLINIC | Age: 47
End: 2022-01-06
Payer: COMMERCIAL

## 2022-01-06 ENCOUNTER — HOSPITAL ENCOUNTER (OUTPATIENT)
Dept: GENERAL RADIOLOGY | Age: 47
Discharge: HOME | End: 2022-01-06
Attending: INTERNAL MEDICINE
Payer: COMMERCIAL

## 2022-01-06 VITALS
DIASTOLIC BLOOD PRESSURE: 74 MMHG | HEIGHT: 63 IN | SYSTOLIC BLOOD PRESSURE: 110 MMHG | HEART RATE: 92 BPM | WEIGHT: 151 LBS | BODY MASS INDEX: 26.75 KG/M2

## 2022-01-06 DIAGNOSIS — R07.81 RIB PAIN: ICD-10-CM

## 2022-01-06 DIAGNOSIS — M79.645 PAIN OF LEFT THUMB: Primary | ICD-10-CM

## 2022-01-06 PROCEDURE — 99213 OFFICE O/P EST LOW 20 MIN: CPT | Performed by: INTERNAL MEDICINE

## 2022-01-06 PROCEDURE — 71100 X-RAY EXAM RIBS UNI 2 VIEWS: CPT | Performed by: INTERNAL MEDICINE

## 2022-01-06 PROCEDURE — 3078F DIAST BP <80 MM HG: CPT | Performed by: INTERNAL MEDICINE

## 2022-01-06 PROCEDURE — 3074F SYST BP LT 130 MM HG: CPT | Performed by: INTERNAL MEDICINE

## 2022-01-06 PROCEDURE — 3008F BODY MASS INDEX DOCD: CPT | Performed by: INTERNAL MEDICINE

## 2022-01-10 NOTE — PROGRESS NOTES
HPI:    Patient ID: Jaret Walton is a 48year old female.     HPI    Pain under left breast  Off and on  Tender spot  Concerned due to her hx of brest CA left  No known recurrence    Right thumb pain      /74 (BP Location: Right arm, Patient tablet (10 mg total) by mouth nightly. 90 tablet 2   • LOSARTAN POTASSIUM 50 MG Oral Tab TAKE 1 TABLET DAILY 90 tablet 3   • Azelastine HCl 0.1 % Nasal Solution 2 sprays by Nasal route 2 (two) times daily.  90 mL 0   • Cholecalciferol (VITAMIN D3) 25 MCG (1 Glaucoma Neg    • Macular degeneration Neg       Social History: Social History    Tobacco Use      Smoking status: Never Smoker      Smokeless tobacco: Never Used      Tobacco comment: Grew up with a smoker, no present household smokers.      Vaping Use

## 2022-01-11 ENCOUNTER — TELEPHONE (OUTPATIENT)
Dept: PHYSICAL THERAPY | Facility: HOSPITAL | Age: 47
End: 2022-01-11

## 2022-01-12 ENCOUNTER — OFFICE VISIT (OUTPATIENT)
Dept: PHYSICAL THERAPY | Facility: HOSPITAL | Age: 47
End: 2022-01-12
Attending: NURSE PRACTITIONER
Payer: COMMERCIAL

## 2022-01-12 DIAGNOSIS — I89.0 LYMPHEDEMA: ICD-10-CM

## 2022-01-12 DIAGNOSIS — C50.812 MALIGNANT NEOPLASM OF OVERLAPPING SITES OF LEFT BREAST IN FEMALE, ESTROGEN RECEPTOR POSITIVE (HCC): ICD-10-CM

## 2022-01-12 DIAGNOSIS — Z17.0 MALIGNANT NEOPLASM OF OVERLAPPING SITES OF LEFT BREAST IN FEMALE, ESTROGEN RECEPTOR POSITIVE (HCC): ICD-10-CM

## 2022-01-12 PROCEDURE — 97140 MANUAL THERAPY 1/> REGIONS: CPT | Performed by: PHYSICAL THERAPIST

## 2022-01-12 PROCEDURE — 97162 PT EVAL MOD COMPLEX 30 MIN: CPT | Performed by: PHYSICAL THERAPIST

## 2022-01-12 NOTE — PROGRESS NOTES
UE LYMPHEDEMA EVALUATION:     Referring Physician: Dr. Víctor Sweeney  Diagnosis: Malignant neoplasm of overlapping sites of left breast in female, estrogen receptor [facility] ([facility] (C50.812,Z17.0)  Lymphedema (I89.0)    Date of onset: 9/14/2020 Evaluation Date: 1/ Potential:good    ASSESSMENT   Naomi Torres presents to [facility] evaluation with swelling LE breast (has significantly increased since last seen 6 months ago), swelling L trunk, decreased thoracic spine jt mobility.   Pt would benefit from an adva swelling noted LUE    Stemmer's Sign: negative     Arm Volume Measurements:   Lymphedema Calculations 1/12/2022 6/28/2021 6/2/2021 4/20/2021 1/20/2021   DATE MEASURED 1/12/2022 6/28/2021 6/2/2021 4/20/2021 1/20/2021   LOCATION/MEASUREMENTS [place] 5/42  Certification From: 2/76/7547  To:4/12/2022    Visit # 2/* Visit: #3/* Visit: #4/* Visit: #5/* Visit: #6/*   Manual Therapy (40 minutes)  MLD: neck sequence, abd sequence, R axilla, R A-A (ant and post), L inguinal, L A-I (supine and sidely), L axill period. Frequency will decrease as symptoms improve.      Treatment will include: Complete Decongestive Therapy: Manual Therapy, Self-Care/Home Management Training, Therapeutic Exercise, Neuromuscular Re-education, Orthotic Management and Training      Tressa

## 2022-01-13 ENCOUNTER — APPOINTMENT (OUTPATIENT)
Dept: PHYSICAL THERAPY | Facility: HOSPITAL | Age: 47
End: 2022-01-13
Attending: NURSE PRACTITIONER
Payer: COMMERCIAL

## 2022-01-19 ENCOUNTER — OFFICE VISIT (OUTPATIENT)
Dept: PHYSICAL THERAPY | Facility: HOSPITAL | Age: 47
End: 2022-01-19
Attending: NURSE PRACTITIONER
Payer: COMMERCIAL

## 2022-01-19 PROCEDURE — 97140 MANUAL THERAPY 1/> REGIONS: CPT | Performed by: PHYSICAL THERAPIST

## 2022-01-19 NOTE — PROGRESS NOTES
Referring Physician: Dr. Stephanie He  Diagnosis: Malignant neoplasm of overlapping sites of left breast in female, estrogen receptor positive ([facility] (C50.812,Z17.0)  Lymphedema (I89.0)    Date of onset: 9/14/2020 Evaluation Date: 1/12/2022          Precautions: ALEKSANDRA Boo's Sign: negative     Arm Volume Measurements:   Lymphedema Calculations 1/12/2022 6/28/2021 6/2/2021 4/20/2021 1/20/2021   DATE MEASURED 1/12/2022 6/28/2021 6/2/2021 4/20/2021 1/20/2021   LOCATION/MEASUREMENTS [place]   PATIENT P 5/57  Certification From: 6/32/2095  To:4/12/2022    Visit # 8/14  Certification From: 8/92/7588  To:4/12/2022  Visit: #3/* Visit: #4/* Visit: #5/* Visit: #6/*   Manual Therapy (40 minutes)  MLD: neck sequence, abd sequence, R axilla, R A-A (ant and post), donning/doffing compression bandages/garments to facilitate swelling reduction and to be independent at self management once discharged  3) Decrease trunk swelling by 2 cm to decrease risk of infection and to decrease pain when reaching  4) Improve thoraci

## 2022-01-26 ENCOUNTER — OFFICE VISIT (OUTPATIENT)
Dept: PHYSICAL THERAPY | Facility: HOSPITAL | Age: 47
End: 2022-01-26
Attending: NURSE PRACTITIONER
Payer: COMMERCIAL

## 2022-01-26 PROCEDURE — 97140 MANUAL THERAPY 1/> REGIONS: CPT | Performed by: PHYSICAL THERAPIST

## 2022-01-26 NOTE — PROGRESS NOTES
Referring Physician: Dr. ChandlerRuby  Diagnosis: Malignant neoplasm of overlapping sites of left breast in female, estrogen receptor positive ([facility] (C50.812,Z17.0)  Lymphedema (I89.0)    Date of onset: 9/14/2020 Evaluation Date: 1/12/2022          Precautions: thoracic kyphosis (flexed posture)    Palpation: tenderness L lower ribs    Edema/Tissue Observations:    - rash R breast (surrounding areola)   - swelling L breast (visually larger than R breast- hangs lower than right and the breast is significantly full 24.8 24.8 24.9   MEASUREMENT F 24.8 24.5 24.3 24.3 25.8   MEASUREMENT G 25.8 25.8 25.8 26.6 28.3   MEASUREMENT H 28.6 28.8 28.5 29.2 31.5   MEASUREMENT I 31.3 31.5 30.4 31.9 34.6    TOTAL VOLUME  [phone]   % D fabricated for L trunk and ribs (under breast and lateral trunk)  - discussed possibly purchasing breast swell spot for add'l breast compression       There Ex (  minutes):      There Ex (5 min)  - reviewed doorway pect str  - thoracic ext in standing w/ paul

## 2022-01-31 ENCOUNTER — OFFICE VISIT (OUTPATIENT)
Dept: PHYSICAL THERAPY | Facility: HOSPITAL | Age: 47
End: 2022-01-31
Attending: NURSE PRACTITIONER
Payer: COMMERCIAL

## 2022-01-31 PROCEDURE — 97140 MANUAL THERAPY 1/> REGIONS: CPT | Performed by: PHYSICAL THERAPIST

## 2022-01-31 NOTE — PROGRESS NOTES
Referring Physician: Dr. Ce Waterman  Diagnosis: Malignant neoplasm of overlapping sites of left breast in female, estrogen receptor positive ([facility] (C50.812,Z17.0)  Lymphedema (I89.0)    Date of onset: 9/14/2020 Evaluation Date: 1/12/2022          Precautions: WFL   Shoulder strength: WFL   Scap strength:     Rhomb 4+/5 B    Mid trap 4/5 B    Jt mobilization: decreased mid-thoracic PA mobility (mod loss)     Posture: Slight increase thoracic kyphosis (flexed posture)    Palpation: tenderness L lower ribs    Darron A 15.5 15.5 15.3 15.3 15.5   MEASUREMENT B 17.9 17.8 17.8 17.8 18.6   MEASUREMENT C 20.6 20.8 20.3 21.4 22.1   MEASUREMENT D 23.8 23.7 23.6 23.9 24.8   MEASUREMENT E 25 24.8 24.8 24.8 24.9   MEASUREMENT F 24.8 24.5 24.3 24.3 25.8   MEASUREMENT G 25.8 25.8 A-A (ant and post), L inguinal, L A-I (supine and sidely), L axilla, L breast. Significant time spent on breast and deep technique along ribs    Compression:  - pt wears compression sleeve as needed  - foam compression pad fabricated for L trunk and ribs ( include: Complete Decongestive Therapy: Manual Therapy, Self-Care/Home Management Training, Therapeutic Exercise, Neuromuscular Re-education, Orthotic Management and Training        Charges: mm3   Total Timed Treatment: 40 min  Total Treatment Time: 45 min

## 2022-02-01 NOTE — TELEPHONE ENCOUNTER
Dr. Miguelina Christopher      Please sign off on form: FMLA  -Highlight the patient and hit \"Chart\" button. -In Chart Review, w/in the Encounter tab - click 1 time on the Telephone call encounter for 12/29/2021 Scroll down the telephone encounter.  -Click \"scan on\" blue Hyperlink under \"Media\" heading for FMLA recert Dr Miguelina Christopher 1/03/9484  w/in the telephone enc.  -Click on Acknowledge button at the bottom right corner and left-click onto image, signature stamp appears and drag signature to Provider signature line. Stamp will turn blue. Close window.      Thank you,      Vern Davis

## 2022-02-02 NOTE — TELEPHONE ENCOUNTER
Completed FMLA forms faxed to [address] [place] [address] at [phone], confirmation received. Sent pt a [employer] message.

## 2022-02-03 ENCOUNTER — APPOINTMENT (OUTPATIENT)
Dept: PHYSICAL THERAPY | Facility: HOSPITAL | Age: 47
End: 2022-02-03
Attending: NURSE PRACTITIONER
Payer: COMMERCIAL

## 2022-02-09 ENCOUNTER — OFFICE VISIT (OUTPATIENT)
Dept: PHYSICAL THERAPY | Facility: HOSPITAL | Age: 47
End: 2022-02-09
Attending: NURSE PRACTITIONER
Payer: COMMERCIAL

## 2022-02-09 PROCEDURE — 97140 MANUAL THERAPY 1/> REGIONS: CPT | Performed by: PHYSICAL THERAPIST

## 2022-02-15 ENCOUNTER — OFFICE VISIT (OUTPATIENT)
Dept: INTERNAL MEDICINE CLINIC | Facility: CLINIC | Age: 47
End: 2022-02-15
Payer: COMMERCIAL

## 2022-02-15 VITALS
DIASTOLIC BLOOD PRESSURE: 70 MMHG | HEART RATE: 81 BPM | SYSTOLIC BLOOD PRESSURE: 105 MMHG | HEIGHT: 63 IN | BODY MASS INDEX: 27 KG/M2

## 2022-02-15 DIAGNOSIS — R21 RASH: Primary | ICD-10-CM

## 2022-02-15 PROCEDURE — 3078F DIAST BP <80 MM HG: CPT | Performed by: INTERNAL MEDICINE

## 2022-02-15 PROCEDURE — 3074F SYST BP LT 130 MM HG: CPT | Performed by: INTERNAL MEDICINE

## 2022-02-15 PROCEDURE — 3008F BODY MASS INDEX DOCD: CPT | Performed by: INTERNAL MEDICINE

## 2022-02-15 PROCEDURE — 99213 OFFICE O/P EST LOW 20 MIN: CPT | Performed by: INTERNAL MEDICINE

## 2022-02-16 ENCOUNTER — APPOINTMENT (OUTPATIENT)
Dept: PHYSICAL THERAPY | Facility: HOSPITAL | Age: 47
End: 2022-02-16
Attending: NURSE PRACTITIONER
Payer: COMMERCIAL

## 2022-02-23 ENCOUNTER — APPOINTMENT (OUTPATIENT)
Dept: PHYSICAL THERAPY | Facility: HOSPITAL | Age: 47
End: 2022-02-23
Attending: NURSE PRACTITIONER
Payer: COMMERCIAL

## 2022-03-02 ENCOUNTER — APPOINTMENT (OUTPATIENT)
Dept: PHYSICAL THERAPY | Facility: HOSPITAL | Age: 47
End: 2022-03-02
Attending: NURSE PRACTITIONER
Payer: COMMERCIAL

## 2022-03-08 ENCOUNTER — OFFICE VISIT (OUTPATIENT)
Dept: PHYSICAL THERAPY | Facility: HOSPITAL | Age: 47
End: 2022-03-08
Attending: NURSE PRACTITIONER
Payer: COMMERCIAL

## 2022-03-08 PROCEDURE — 97140 MANUAL THERAPY 1/> REGIONS: CPT | Performed by: PHYSICAL THERAPIST

## 2022-03-21 ENCOUNTER — OFFICE VISIT (OUTPATIENT)
Dept: DERMATOLOGY CLINIC | Facility: CLINIC | Age: 47
End: 2022-03-21
Payer: COMMERCIAL

## 2022-03-21 DIAGNOSIS — L30.9 NIPPLE DERMATITIS: Primary | ICD-10-CM

## 2022-03-21 PROCEDURE — 99213 OFFICE O/P EST LOW 20 MIN: CPT | Performed by: PHYSICIAN ASSISTANT

## 2022-04-05 ENCOUNTER — OFFICE VISIT (OUTPATIENT)
Dept: PHYSICAL THERAPY | Facility: HOSPITAL | Age: 47
End: 2022-04-05
Attending: NURSE PRACTITIONER
Payer: COMMERCIAL

## 2022-04-05 PROCEDURE — 97140 MANUAL THERAPY 1/> REGIONS: CPT | Performed by: PHYSICAL THERAPIST

## 2022-04-08 ENCOUNTER — TELEPHONE (OUTPATIENT)
Dept: HEMATOLOGY/ONCOLOGY | Facility: HOSPITAL | Age: 47
End: 2022-04-08

## 2022-04-12 ENCOUNTER — OFFICE VISIT (OUTPATIENT)
Dept: PHYSICAL THERAPY | Facility: HOSPITAL | Age: 47
End: 2022-04-12
Attending: NURSE PRACTITIONER
Payer: COMMERCIAL

## 2022-04-12 PROCEDURE — 97140 MANUAL THERAPY 1/> REGIONS: CPT | Performed by: PHYSICAL THERAPIST

## 2022-04-15 ENCOUNTER — TELEPHONE (OUTPATIENT)
Dept: HEMATOLOGY/ONCOLOGY | Facility: HOSPITAL | Age: 47
End: 2022-04-15

## 2022-04-15 NOTE — TELEPHONE ENCOUNTER
Faxed letter signed by Dr. Roseline Delacruz to [facility] for [employer] Plus system authorization process.

## 2022-04-19 ENCOUNTER — OFFICE VISIT (OUTPATIENT)
Dept: PHYSICAL THERAPY | Facility: HOSPITAL | Age: 47
End: 2022-04-19
Attending: NURSE PRACTITIONER
Payer: COMMERCIAL

## 2022-04-19 PROCEDURE — 97140 MANUAL THERAPY 1/> REGIONS: CPT | Performed by: PHYSICAL THERAPIST

## 2022-04-26 ENCOUNTER — OFFICE VISIT (OUTPATIENT)
Dept: PHYSICAL THERAPY | Facility: HOSPITAL | Age: 47
End: 2022-04-26
Attending: NURSE PRACTITIONER
Payer: COMMERCIAL

## 2022-04-26 PROCEDURE — 97140 MANUAL THERAPY 1/> REGIONS: CPT | Performed by: PHYSICAL THERAPIST

## 2022-04-29 ENCOUNTER — TELEPHONE (OUTPATIENT)
Dept: HEMATOLOGY/ONCOLOGY | Facility: HOSPITAL | Age: 47
End: 2022-04-29

## 2022-04-29 NOTE — TELEPHONE ENCOUNTER
Faxed signed order by Dr. Onofre De La Cruz to [facility] for compression bra garments,compression arm sleeves.

## 2022-05-09 ENCOUNTER — OFFICE VISIT (OUTPATIENT)
Dept: SURGERY | Facility: CLINIC | Age: 47
End: 2022-05-09
Payer: COMMERCIAL

## 2022-05-09 DIAGNOSIS — M65.4 DE QUERVAIN'S TENOSYNOVITIS: Primary | ICD-10-CM

## 2022-05-09 DIAGNOSIS — R20.0 HAND NUMBNESS: ICD-10-CM

## 2022-05-09 PROCEDURE — 99243 OFF/OP CNSLTJ NEW/EST LOW 30: CPT | Performed by: PLASTIC SURGERY

## 2022-05-16 ENCOUNTER — APPOINTMENT (OUTPATIENT)
Dept: PHYSICAL THERAPY | Facility: HOSPITAL | Age: 47
End: 2022-05-16
Attending: INTERNAL MEDICINE
Payer: COMMERCIAL

## 2022-05-16 ENCOUNTER — HOSPITAL ENCOUNTER (OUTPATIENT)
Dept: MAMMOGRAPHY | Facility: HOSPITAL | Age: 47
Discharge: HOME | End: 2022-05-16
Attending: INTERNAL MEDICINE
Payer: COMMERCIAL

## 2022-05-16 DIAGNOSIS — C50.812 MALIGNANT NEOPLASM OF OVERLAPPING SITES OF LEFT BREAST IN FEMALE, ESTROGEN RECEPTOR POSITIVE (HCC): ICD-10-CM

## 2022-05-16 DIAGNOSIS — Z17.0 MALIGNANT NEOPLASM OF OVERLAPPING SITES OF LEFT BREAST IN FEMALE, ESTROGEN RECEPTOR POSITIVE (HCC): ICD-10-CM

## 2022-05-16 PROCEDURE — 77061 BREAST TOMOSYNTHESIS UNI: CPT | Performed by: INTERNAL MEDICINE

## 2022-05-16 PROCEDURE — 77065 DX MAMMO INCL CAD UNI: CPT | Performed by: INTERNAL MEDICINE

## 2022-05-23 ENCOUNTER — OFFICE VISIT (OUTPATIENT)
Dept: HEMATOLOGY/ONCOLOGY | Facility: HOSPITAL | Age: 47
End: 2022-05-23
Attending: INTERNAL MEDICINE
Payer: COMMERCIAL

## 2022-05-23 VITALS
OXYGEN SATURATION: 99 % | BODY MASS INDEX: 27.29 KG/M2 | HEART RATE: 72 BPM | DIASTOLIC BLOOD PRESSURE: 85 MMHG | SYSTOLIC BLOOD PRESSURE: 120 MMHG | HEIGHT: 63 IN | RESPIRATION RATE: 16 BRPM | TEMPERATURE: 100 F | WEIGHT: 154 LBS

## 2022-05-23 DIAGNOSIS — C50.812 MALIGNANT NEOPLASM OF OVERLAPPING SITES OF LEFT BREAST IN FEMALE, ESTROGEN RECEPTOR POSITIVE (HCC): ICD-10-CM

## 2022-05-23 DIAGNOSIS — Z17.0 MALIGNANT NEOPLASM OF OVERLAPPING SITES OF LEFT BREAST IN FEMALE, ESTROGEN RECEPTOR POSITIVE (HCC): ICD-10-CM

## 2022-05-23 DIAGNOSIS — Z79.810 ENCOUNTER FOR MONITORING TAMOXIFEN THERAPY: ICD-10-CM

## 2022-05-23 DIAGNOSIS — Z51.81 ENCOUNTER FOR MONITORING TAMOXIFEN THERAPY: ICD-10-CM

## 2022-05-23 PROCEDURE — 99214 OFFICE O/P EST MOD 30 MIN: CPT | Performed by: INTERNAL MEDICINE

## 2022-05-24 RX ORDER — LOSARTAN POTASSIUM 50 MG/1
50 TABLET ORAL DAILY
Qty: 90 TABLET | Refills: 3 | Status: SHIPPED | OUTPATIENT
Start: 2022-05-24

## 2022-05-24 NOTE — TELEPHONE ENCOUNTER
Please review. Protocol failed or has no protocol. Requested Prescriptions   Pending Prescriptions Disp Refills    LOSARTAN 50 MG Oral Tab [Pharmacy Med Name: LOSARTAN TABS 50MG] 90 tablet 3     Sig: TAKE 1 TABLET DAILY        Hypertensive Medications Protocol Failed - 5/23/2022 11:16 PM        Failed - CMP or BMP in past 12 months        Passed - Appointment in past 6 or next 3 months        Passed - GFR Non- > 50     Lab Results   Component Value Date    GFRNAA 83 04/01/2021                       Recent Outpatient Visits              Yesterday Malignant neoplasm of overlapping sites of left breast in female, estrogen receptor positive [facility])    [facility] Hematology Oncology Moustapha Bowie MD    Office Visit    2 weeks ago [facility], [address], Estee Ferguson MD    Office Visit    4 weeks ago     [facility] [place]    Office Visit    1 month ago     [place], [place]    Office Visit    1 month ago     [address] [place], [place]    Office Visit            Future Appointments         Provider Department Appt Notes    In 4 weeks [facility] pt can only come at [facility] PPO  no c/p, no auth, 60v pcy    In 1 month Jerry Garsia MD [facility] for [facility] Surgery Check up    In 6 months Stefanie Reese 19 Hematology Oncology FOLLOW UP VISIT. CL  6M

## 2022-06-08 ENCOUNTER — TELEPHONE (OUTPATIENT)
Dept: INTERNAL MEDICINE CLINIC | Facility: CLINIC | Age: 47
End: 2022-06-08

## 2022-06-16 NOTE — TELEPHONE ENCOUNTER
Dr. Yifan Hammond,    Patient is asking for intermittent FMLA re-certification. If you continue to support, please sign off. Please sign off on form:  -Highlight the patient and hit \"Chart\" button. -In Chart Review, w/in the Encounter tab - click 1 time on the Telephone call encounter for 6/8/22 Scroll down the telephone encounter.  -Click \"scan on\" blue Hyperlink under \"Media\" heading for FMLA Dr. Yifan Hammond 6/16/22 w/in the telephone enc.  -Click on Acknowledge button at the bottom right corner and left-click onto image, signature stamp appears and drag signature to Provider signature line. Stamp will turn blue. Close window.      Thank you,    Wilfred Cortez

## 2022-06-21 ENCOUNTER — OFFICE VISIT (OUTPATIENT)
Dept: PHYSICAL THERAPY | Facility: HOSPITAL | Age: 47
End: 2022-06-21
Attending: INTERNAL MEDICINE
Payer: COMMERCIAL

## 2022-06-21 PROCEDURE — 97140 MANUAL THERAPY 1/> REGIONS: CPT | Performed by: PHYSICAL THERAPIST

## 2022-06-27 ENCOUNTER — OFFICE VISIT (OUTPATIENT)
Dept: SURGERY | Facility: CLINIC | Age: 47
End: 2022-06-27
Payer: COMMERCIAL

## 2022-06-27 VITALS — WEIGHT: 154 LBS | BODY MASS INDEX: 27.29 KG/M2 | HEIGHT: 63 IN

## 2022-06-27 DIAGNOSIS — Z85.3 PERSONAL HISTORY OF BREAST CANCER: ICD-10-CM

## 2022-06-27 DIAGNOSIS — Z80.3 FAMILY HISTORY OF BREAST CANCER: Primary | ICD-10-CM

## 2022-06-27 PROCEDURE — 3008F BODY MASS INDEX DOCD: CPT | Performed by: SURGERY

## 2022-06-27 PROCEDURE — 99214 OFFICE O/P EST MOD 30 MIN: CPT | Performed by: SURGERY

## 2022-07-02 RX ORDER — APREMILAST 30 MG/1
1 TABLET, FILM COATED ORAL 2 TIMES DAILY
Qty: 60 TABLET | Refills: 5 | Status: SHIPPED | OUTPATIENT
Start: 2022-07-02

## 2022-07-13 ENCOUNTER — OFFICE VISIT (OUTPATIENT)
Dept: INTERNAL MEDICINE CLINIC | Facility: CLINIC | Age: 47
End: 2022-07-13
Payer: COMMERCIAL

## 2022-07-13 VITALS
DIASTOLIC BLOOD PRESSURE: 77 MMHG | BODY MASS INDEX: 27 KG/M2 | HEART RATE: 80 BPM | HEIGHT: 63 IN | SYSTOLIC BLOOD PRESSURE: 115 MMHG

## 2022-07-13 DIAGNOSIS — G47.00 INSOMNIA, UNSPECIFIED TYPE: Primary | ICD-10-CM

## 2022-07-13 DIAGNOSIS — Z85.3 HISTORY OF BREAST CANCER: ICD-10-CM

## 2022-07-13 DIAGNOSIS — F41.9 ANXIETY: ICD-10-CM

## 2022-07-13 PROCEDURE — 3078F DIAST BP <80 MM HG: CPT | Performed by: INTERNAL MEDICINE

## 2022-07-13 PROCEDURE — 3008F BODY MASS INDEX DOCD: CPT | Performed by: INTERNAL MEDICINE

## 2022-07-13 PROCEDURE — 99213 OFFICE O/P EST LOW 20 MIN: CPT | Performed by: INTERNAL MEDICINE

## 2022-07-13 PROCEDURE — 3074F SYST BP LT 130 MM HG: CPT | Performed by: INTERNAL MEDICINE

## 2022-07-13 RX ORDER — ZOLPIDEM TARTRATE 5 MG/1
5 TABLET ORAL NIGHTLY PRN
Qty: 30 TABLET | Refills: 0 | Status: SHIPPED | OUTPATIENT
Start: 2022-07-13 | End: 2023-07-13

## 2022-07-19 ENCOUNTER — OFFICE VISIT (OUTPATIENT)
Dept: PHYSICAL THERAPY | Facility: HOSPITAL | Age: 47
End: 2022-07-19
Attending: INTERNAL MEDICINE
Payer: COMMERCIAL

## 2022-07-19 PROCEDURE — 97140 MANUAL THERAPY 1/> REGIONS: CPT | Performed by: PHYSICAL THERAPIST

## 2022-07-22 ENCOUNTER — OFFICE VISIT (OUTPATIENT)
Dept: DERMATOLOGY CLINIC | Facility: CLINIC | Age: 47
End: 2022-07-22
Payer: COMMERCIAL

## 2022-07-22 DIAGNOSIS — L72.0 MILIA: Primary | ICD-10-CM

## 2022-07-22 DIAGNOSIS — L82.1 SEBORRHEIC KERATOSIS: ICD-10-CM

## 2022-07-22 PROCEDURE — 99213 OFFICE O/P EST LOW 20 MIN: CPT | Performed by: PHYSICIAN ASSISTANT

## 2022-07-22 PROCEDURE — 17110 DESTRUCTION B9 LES UP TO 14: CPT | Performed by: PHYSICIAN ASSISTANT

## 2022-10-03 ENCOUNTER — OFFICE VISIT (OUTPATIENT)
Dept: SURGERY | Facility: CLINIC | Age: 47
End: 2022-10-03
Payer: COMMERCIAL

## 2022-10-03 DIAGNOSIS — Z85.3 PERSONAL HISTORY OF BREAST CANCER: Primary | ICD-10-CM

## 2022-10-03 DIAGNOSIS — I89.0 LYMPHEDEMA OF BREAST: ICD-10-CM

## 2022-10-03 PROCEDURE — 99214 OFFICE O/P EST MOD 30 MIN: CPT | Performed by: SURGERY

## 2022-10-03 NOTE — PROGRESS NOTES
S:  Lacy Garcia is a 48year old female sp lumpectomy, SN, Radiation  Doing well, no fevers, no chills, tolerating a general diet, generally normal bowel movements, no calf tenderness nor lower extremity edema, no shortness of breath, no chest pain. Mammogram of 5/2022 reviewed. ^ mos diag mammogram planned. Had PT for breast edema and upper extremity edema, improved. Not sleep ing well. Long h/o this. O:  LMP 07/01/2022 (Approximate)   Constitutional: appears well hydrated alert and responsive no acute distress noted  Head/Face: normocephalic, atraumatic. Eyes: Anicteric, PERRL  Nose/Mouth/Throat: nose and throat are clear,  no discharge, mucous membranes are moist  Neck/Thyroid: neck is supple without adenopathy, no thyromegaly, no JVD. Respiratory: normal to inspection, normal respiratory effort, no wheezing. Breast: Slight post radiation appearance on L, R is normal, no contour changes, no worrisome asymmetry, dermal edema L. Nipple normal appearance bilaterally, no inversion, no retraction, no discharge. Palpation L breast upper outer Q, mid, density c/w post surgical healing, other wise normal, no masses, no densities, no tenderness. Lymph node exam normal in the bilateral axillary, cervical, and supraclavicular regions. Cardiovascular: regular rate. Abdomen: soft, non-tender, non-distended, no organomegaly noted, no masses, no hernias, no ascites. Extremities: no edema, cyanosis, or clubbing. No deformity. Musculoskeletal: normal appearance, no deformities, normal function. Back wnl, no CVA tenderness.   Neurological: exam appropriate for age sensory and motor function wnl      Path:  Reviewed w pt    Assessment   Personal history of breast cancer  (primary encounter diagnosis)  Lymphedema of breast    Doing well  Post radiation breast dermal edema is improving and almost entirely resolved clinically  Continue to elevate breast and LUE  Use compression bra as directed by breast physical therapist, alternating bra styles and sleep positions discussed. Maintain a healthy diet. Maintain good hydration.   Encouraged Alma to try the sleeping medication on a weekend to see how she tolerates it, prescribed by her PMD.   F/u 3 mos CBE,  Naomi Farias mammogram and Mony Lazaro MD

## 2022-10-06 RX ORDER — MONTELUKAST SODIUM 10 MG/1
TABLET ORAL
Qty: 90 TABLET | Refills: 0 | Status: SHIPPED | OUTPATIENT
Start: 2022-10-06

## 2022-10-12 ENCOUNTER — OFFICE VISIT (OUTPATIENT)
Dept: OBGYN CLINIC | Facility: CLINIC | Age: 47
End: 2022-10-12
Payer: COMMERCIAL

## 2022-10-12 VITALS
BODY MASS INDEX: 27 KG/M2 | SYSTOLIC BLOOD PRESSURE: 138 MMHG | WEIGHT: 153 LBS | DIASTOLIC BLOOD PRESSURE: 87 MMHG | HEART RATE: 71 BPM

## 2022-10-12 DIAGNOSIS — Z01.419 ENCOUNTER FOR GYNECOLOGICAL EXAMINATION: Primary | ICD-10-CM

## 2022-10-12 DIAGNOSIS — Z85.3 HISTORY OF BREAST CANCER: ICD-10-CM

## 2022-10-12 PROCEDURE — 99396 PREV VISIT EST AGE 40-64: CPT | Performed by: OBSTETRICS & GYNECOLOGY

## 2022-10-12 PROCEDURE — 3079F DIAST BP 80-89 MM HG: CPT | Performed by: OBSTETRICS & GYNECOLOGY

## 2022-10-12 PROCEDURE — 3075F SYST BP GE 130 - 139MM HG: CPT | Performed by: OBSTETRICS & GYNECOLOGY

## 2022-10-17 ENCOUNTER — TELEPHONE (OUTPATIENT)
Dept: DERMATOLOGY CLINIC | Facility: CLINIC | Age: 47
End: 2022-10-17

## 2022-10-17 NOTE — TELEPHONE ENCOUNTER
LMTCB to schedule an appt for pt. Pt has not been seen since 11/2021 with Prosper Jackson for Psoriasis.   Will need appt in order to move forward with PA renewal.

## 2022-11-01 ENCOUNTER — TELEPHONE (OUTPATIENT)
Dept: FAMILY MEDICINE CLINIC | Facility: CLINIC | Age: 47
End: 2022-11-01

## 2022-11-01 NOTE — TELEPHONE ENCOUNTER
Pt has appt in December. Will address PA at that time.   We will need recent office visit notes in order to proceed with PA renewal.

## 2022-11-18 ENCOUNTER — HOSPITAL ENCOUNTER (OUTPATIENT)
Dept: MAMMOGRAPHY | Facility: HOSPITAL | Age: 47
Discharge: HOME | End: 2022-11-18
Attending: INTERNAL MEDICINE
Payer: COMMERCIAL

## 2022-11-18 DIAGNOSIS — C50.812 MALIGNANT NEOPLASM OF OVERLAPPING SITES OF LEFT BREAST IN FEMALE, ESTROGEN RECEPTOR POSITIVE (HCC): ICD-10-CM

## 2022-11-18 DIAGNOSIS — Z17.0 MALIGNANT NEOPLASM OF OVERLAPPING SITES OF LEFT BREAST IN FEMALE, ESTROGEN RECEPTOR POSITIVE (HCC): ICD-10-CM

## 2022-11-18 PROCEDURE — 77062 BREAST TOMOSYNTHESIS BI: CPT | Performed by: INTERNAL MEDICINE

## 2022-11-18 PROCEDURE — 77066 DX MAMMO INCL CAD BI: CPT | Performed by: INTERNAL MEDICINE

## 2022-11-23 NOTE — DISCHARGE INSTRUCTIONS
The Doctor (Radiologist) who performed your procedure was: Dr Rosario Miranda an ice pack over the biopsy site on top of your bra or on top of the ACE wrap (never apply ice directly over skin) for 10-15 minutes of every hour until bedtime for your comfort and to decrease bleeding. Keep your sports bra or the ACE wrap (stereotactic and MRI biopsy) in place for 24 hours after your biopsy. This compression decreases bleeding and breast movement for your comfort. Wear a supportive bra for the next couple of days for comfort (sports bra for sleep). Continue to wear, preferably, a sports bra or good supportive bra for 1 week and take off only to shower. No baths or showers during the first 24 hours after biopsy. After this time you may take a shower. It's okay if the strips get wet but do not soak them. NO saunas, hot tubs or swimming until steri-strips fall off (approx. 5 days). This prevents infection and allows time for them to completely close and heal.  DO NOT remove the steri-strips. They will fall off in 5 days. If any type of irritation (redness, itching or blisters) develops in the area around the steri-strips, remove them gently. If the steri-strips do not fall off after 5 days, gently remove them. Keep the area clean and dry. It is normal to have mild discomfort and bruising at the biopsy site. You may take Tylenol as needed for discomfort, as long as you have no allergies to Tylenol. Do not take aspirin, motrin, ibuprofen or any medication containing NSAID (non-steroidal anti-inflammatory drug) product for 48 hours. DO NOT participate in strenuous activity (aerobics, heavy lifting, housework, gardening, etc.) 48 hours after your biopsy to prevent bleeding. You will receive results in 2-3 business days from the result clinic. If you are having an MRI breast biopsy or an Ultrasound guided breast biopsy, you will be billed for the biopsy and unilateral mammogram separately.   If you have any questions about the procedure or your results, please contact the Breast Care Coordinator Nurse at [phone]. Notify your ordering physician or primary physician for increased bleeding, pain or fever over 100. Or contact a Radiology Nurse at [phone] between 8am-4pm (after 4pm, your call will be directed to the [place] [address] Emergency Room).

## 2022-11-28 ENCOUNTER — OFFICE VISIT (OUTPATIENT)
Dept: HEMATOLOGY/ONCOLOGY | Facility: HOSPITAL | Age: 47
End: 2022-11-28
Attending: INTERNAL MEDICINE
Payer: COMMERCIAL

## 2022-11-28 VITALS
WEIGHT: 153 LBS | SYSTOLIC BLOOD PRESSURE: 125 MMHG | BODY MASS INDEX: 27.11 KG/M2 | HEART RATE: 80 BPM | HEIGHT: 63 IN | DIASTOLIC BLOOD PRESSURE: 84 MMHG | RESPIRATION RATE: 16 BRPM | OXYGEN SATURATION: 99 % | TEMPERATURE: 99 F

## 2022-11-28 DIAGNOSIS — Z51.81 ENCOUNTER FOR MONITORING TAMOXIFEN THERAPY: ICD-10-CM

## 2022-11-28 DIAGNOSIS — Z17.0 MALIGNANT NEOPLASM OF OVERLAPPING SITES OF LEFT BREAST IN FEMALE, ESTROGEN RECEPTOR POSITIVE (HCC): Primary | ICD-10-CM

## 2022-11-28 DIAGNOSIS — Z79.810 ENCOUNTER FOR MONITORING TAMOXIFEN THERAPY: ICD-10-CM

## 2022-11-28 DIAGNOSIS — Z85.3 ENCOUNTER FOR FOLLOW-UP SURVEILLANCE OF BREAST CANCER: ICD-10-CM

## 2022-11-28 DIAGNOSIS — C50.812 MALIGNANT NEOPLASM OF OVERLAPPING SITES OF LEFT BREAST IN FEMALE, ESTROGEN RECEPTOR POSITIVE (HCC): Primary | ICD-10-CM

## 2022-11-28 DIAGNOSIS — Z08 ENCOUNTER FOR FOLLOW-UP SURVEILLANCE OF BREAST CANCER: ICD-10-CM

## 2022-11-28 PROCEDURE — 99214 OFFICE O/P EST MOD 30 MIN: CPT | Performed by: INTERNAL MEDICINE

## 2022-12-02 ENCOUNTER — HOSPITAL ENCOUNTER (OUTPATIENT)
Dept: MAMMOGRAPHY | Facility: HOSPITAL | Age: 47
Discharge: HOME | End: 2022-12-02
Attending: INTERNAL MEDICINE
Payer: COMMERCIAL

## 2022-12-02 DIAGNOSIS — R92.2 BREAST DENSITY: ICD-10-CM

## 2022-12-02 DIAGNOSIS — R92.8 ABNORMAL MAMMOGRAM: ICD-10-CM

## 2022-12-02 PROCEDURE — 88305 TISSUE EXAM BY PATHOLOGIST: CPT | Performed by: INTERNAL MEDICINE

## 2022-12-02 PROCEDURE — 19081 BX BREAST 1ST LESION STRTCTC: CPT | Performed by: INTERNAL MEDICINE

## 2022-12-02 NOTE — PROCEDURES
[facility]  Procedure Note    Lobito Garcia Patient Status:  Outpatient    10/14/1975 MRN [id number]   Location [address] [place] [address] Attending Dexter Sorto MD   Hosp Day # 0 PCP Jc Rizvi MD     Procedure: Left breast stereotactic/hanh guided biopsy    Pre-Procedure Diagnosis:  Left breast mass    Post-Procedure Diagnosis: Left breast mass    Anesthesia:  Local    Findings:  Left breast mass    Specimens: multiple cores    Blood Loss:  minimal    Tourniquet Time: none  Complications:  None  Drains:  none    Helene Ennis MD  2022

## 2022-12-06 ENCOUNTER — TELEPHONE (OUTPATIENT)
Dept: ULTRASOUND IMAGING | Facility: HOSPITAL | Age: 47
End: 2022-12-06

## 2022-12-06 NOTE — TELEPHONE ENCOUNTER
Modesto Munoz is s/p biopsy . Phoned and introduced myself as breast coordinator . Reinforced to patient  post biopsy care and instructions . No c/o post procedure . She has f/u appt with Dr Roderick Pinon 12/19/22    Informed  and shared the pathology results as well as the recommendations from Dr Pelon Langford  for her breast imaging  as follows:        PATHOLOGY:   Left breast, upper inner quadrant; core biopsy:  * Breast parenchyma with dense fibrous stroma, focal mild chronic inflammation, hemosiderin-laden histiocytes, and foreign body-type giant cell reaction. No glandular atypia or malignancy identified.              =====  CONCLUSION: Uneventful stereotactic with tomosynthesis guidance biopsy of the dense architectural distortion representing the lumpectomy site in the left breast upper inner performed without immediate complication and marked with an X shaped clip which   is displaced 1.4 centimeters inferior to the site of biopsy. Pathology demonstrates benign findings including chronic inflammation and foreign body type giant cell reaction and is concordant with imaging. RECOMMENDATION:  Six-month follow-up left breast diagnostic mammogram                      Dictated by (CST): Erica Tejada MD on 12/02/2022 at 10:50 AM       Finalized by (CST): Eriac Tejada MD on 12/05/2022 at 7:00 AM            [address] [place] [address] the above and denies questions. She was also instructed to perform breast self exams and if she develops any changes to contact her physician immediately  for re evaluation. David Gastelum verbalizes understanding and agreement.

## 2022-12-12 ENCOUNTER — OFFICE VISIT (OUTPATIENT)
Dept: DERMATOLOGY CLINIC | Facility: CLINIC | Age: 47
End: 2022-12-12
Payer: COMMERCIAL

## 2022-12-12 DIAGNOSIS — Z51.81 MEDICATION MONITORING ENCOUNTER: ICD-10-CM

## 2022-12-12 DIAGNOSIS — L40.9 PSORIASIS: Primary | ICD-10-CM

## 2022-12-12 PROCEDURE — 99214 OFFICE O/P EST MOD 30 MIN: CPT | Performed by: DERMATOLOGY

## 2022-12-12 RX ORDER — APREMILAST 30 MG/1
1 TABLET, FILM COATED ORAL 2 TIMES DAILY
Qty: 180 TABLET | Refills: 3 | Status: SHIPPED | OUTPATIENT
Start: 2022-12-12

## 2022-12-12 RX ORDER — PIMECROLIMUS 10 MG/G
1 CREAM TOPICAL 2 TIMES DAILY
Qty: 60 G | Refills: 6 | Status: SHIPPED | OUTPATIENT
Start: 2022-12-12

## 2022-12-13 ENCOUNTER — TELEPHONE (OUTPATIENT)
Dept: DERMATOLOGY CLINIC | Facility: CLINIC | Age: 47
End: 2022-12-13

## 2022-12-13 NOTE — TELEPHONE ENCOUNTER
Fax from [address] [place] [address] required for PIMECROLIMUS 1% CREAM    Placed fax in pa inbox

## 2022-12-14 NOTE — TELEPHONE ENCOUNTER
Medication PA Requested:     Pimecrolimus 1%     cream                                                 CoverMyMeds Used:  Key:  Quantity: 60gm  Day Supply: 30  Sig: use BID  DX Code:    L40.9                                 CPT code (if applicable):   Case Number/Pending Ref#:

## 2022-12-16 ENCOUNTER — TELEPHONE (OUTPATIENT)
Dept: DERMATOLOGY CLINIC | Facility: CLINIC | Age: 47
End: 2022-12-16

## 2022-12-16 NOTE — TELEPHONE ENCOUNTER
Fax from [employer]    Approved for Otezla 30mg tablet from 11/16/22-12/16/23    Placed fax in pa inbox

## 2022-12-19 ENCOUNTER — OFFICE VISIT (OUTPATIENT)
Dept: SURGERY | Facility: CLINIC | Age: 47
End: 2022-12-19
Payer: COMMERCIAL

## 2022-12-19 DIAGNOSIS — N63.0 MASS OF BREAST, UNSPECIFIED LATERALITY: Primary | ICD-10-CM

## 2022-12-19 PROCEDURE — 99213 OFFICE O/P EST LOW 20 MIN: CPT | Performed by: SURGERY

## 2022-12-20 ENCOUNTER — OFFICE VISIT (OUTPATIENT)
Dept: ALLERGY | Facility: CLINIC | Age: 47
End: 2022-12-20
Payer: COMMERCIAL

## 2022-12-20 VITALS
OXYGEN SATURATION: 99 % | DIASTOLIC BLOOD PRESSURE: 84 MMHG | RESPIRATION RATE: 18 BRPM | HEART RATE: 79 BPM | BODY MASS INDEX: 26.75 KG/M2 | SYSTOLIC BLOOD PRESSURE: 129 MMHG | HEIGHT: 63 IN | WEIGHT: 151 LBS

## 2022-12-20 DIAGNOSIS — J32.9 CHRONIC SINUSITIS, UNSPECIFIED LOCATION: Primary | ICD-10-CM

## 2022-12-20 DIAGNOSIS — J30.89 SEASONAL AND PERENNIAL ALLERGIC RHINOCONJUNCTIVITIS: ICD-10-CM

## 2022-12-20 DIAGNOSIS — J30.2 SEASONAL AND PERENNIAL ALLERGIC RHINOCONJUNCTIVITIS: ICD-10-CM

## 2022-12-20 DIAGNOSIS — Z23 FLU VACCINE NEED: ICD-10-CM

## 2022-12-20 DIAGNOSIS — H10.10 SEASONAL AND PERENNIAL ALLERGIC RHINOCONJUNCTIVITIS: ICD-10-CM

## 2022-12-20 DIAGNOSIS — Z92.29 COVID-19 VACCINE SERIES COMPLETED: ICD-10-CM

## 2022-12-20 PROCEDURE — 3008F BODY MASS INDEX DOCD: CPT | Performed by: ALLERGY & IMMUNOLOGY

## 2022-12-20 PROCEDURE — 3074F SYST BP LT 130 MM HG: CPT | Performed by: ALLERGY & IMMUNOLOGY

## 2022-12-20 PROCEDURE — 3079F DIAST BP 80-89 MM HG: CPT | Performed by: ALLERGY & IMMUNOLOGY

## 2022-12-20 PROCEDURE — 99214 OFFICE O/P EST MOD 30 MIN: CPT | Performed by: ALLERGY & IMMUNOLOGY

## 2022-12-20 RX ORDER — LEVOCETIRIZINE DIHYDROCHLORIDE 5 MG/1
5 TABLET, FILM COATED ORAL EVERY EVENING
Qty: 90 TABLET | Refills: 2 | Status: SHIPPED | OUTPATIENT
Start: 2022-12-20

## 2022-12-20 RX ORDER — MONTELUKAST SODIUM 10 MG/1
10 TABLET ORAL NIGHTLY
Qty: 90 TABLET | Refills: 0 | Status: SHIPPED | OUTPATIENT
Start: 2022-12-20

## 2022-12-20 RX ORDER — BECLOMETHASONE DIPROPIONATE 80 UG/1
2 AEROSOL, METERED NASAL DAILY
Qty: 3 EACH | Refills: 0 | Status: SHIPPED | OUTPATIENT
Start: 2022-12-20 | End: 2022-12-23

## 2022-12-20 NOTE — PATIENT INSTRUCTIONS
#1 chronic sinusitis  Denies recurrent sinus infections or antibiotics in the interim  Continue treatment of underlying environmental allergies  Add trial of nasal saline sinus rinses when sinuses flare    #2 allergic rhinitis  Continue with Xyzal Astelin Singulair. We will attempt to see if Qvar is covered. Otherwise will return to Nasonex  Reviewed avoidance measures and potential treatment option immunotherapy. Prior testing positive dogs. 3 dogs in the home    #3 COVID vaccinations up-to-date.   Recommend booster    #4 flu vaccine up-to-date    Follow-up in 1 year or sooner if needed

## 2022-12-20 NOTE — TELEPHONE ENCOUNTER
Medication PA Requested:     Pimecrolimus 1%     cream                                                 CoverMyMeds Used:  Key:  Quantity: 60gm  Day Supply: 30  Sig: use BID  DX Code:    L40.9  Psoriasis    Faxed [employer] PA form  Awaiting determination.

## 2022-12-22 ENCOUNTER — TELEPHONE (OUTPATIENT)
Dept: ALLERGY | Facility: CLINIC | Age: 47
End: 2022-12-22

## 2022-12-22 NOTE — TELEPHONE ENCOUNTER
[employer] calling regarding:  Beclomethasone Dipropionate (QNASL) 80 MCG/ACT Nasal Aero Soln   Patients plan does not cover this, some preferred alternatives are:  Flunisolide nasal spray  Fluticasone prop nasal spray  These may require a prior authorization   Ref# [id number]

## 2022-12-23 RX ORDER — FLUTICASONE PROPIONATE 50 MCG
2 SPRAY, SUSPENSION (ML) NASAL DAILY
Qty: 3 EACH | Refills: 2 | Status: SHIPPED | OUTPATIENT
Start: 2022-12-23

## 2022-12-23 NOTE — TELEPHONE ENCOUNTER
Dr. Destiny Alejandro please see message listed below. It appears that pt's Duran Cast is not covered by insurance.   Covered alternatives include:  Flunisolide nasal spray  Fluticasone prop nasal spray

## 2023-01-16 RX ORDER — PIMECROLIMUS 10 MG/G
1 CREAM TOPICAL 2 TIMES DAILY
Qty: 60 G | Refills: 5 | Status: SHIPPED | OUTPATIENT
Start: 2023-01-16

## 2023-01-16 RX ORDER — PIMECROLIMUS 10 MG/G
1 CREAM TOPICAL 2 TIMES DAILY
Qty: 60 G | Refills: 6 | Status: CANCELLED | OUTPATIENT
Start: 2023-01-16

## 2023-01-16 NOTE — TELEPHONE ENCOUNTER
Refill Request for medication(s):     Last Office Visit: 12/12/22    Last Refill: 12/12/22    Pharmacy, Dosage verified:     Condition Update (if applicable):     Rx pended and sent to provider for approval, please advise. Thank You!     S/w pt - pt has active RX with refills at [facility], wants this transferred - RX transferred

## 2023-02-02 NOTE — PROGRESS NOTES
S:  Kenisha Kamara is a 50year old female sp lumpectomy, SN, Radiation  Doing well, no fevers, no chills, tolerating a general diet, generally normal bowel movements, no calf tenderness nor lower extremity edema, no shortness of breath, no chest pain. Mammogram of 5/2022 reviewed. ^ mos diag mammogram planned. Had PT for breast edema and upper extremity edema, improved. Not sleep ing well. Long h/o this. O:  LMP 10/31/2022 (Approximate)   Constitutional: appears well hydrated alert and responsive no acute distress noted  Head/Face: normocephalic, atraumatic. Eyes: Anicteric, PERRL  Nose/Mouth/Throat: nose and throat are clear,  no discharge, mucous membranes are moist  Neck/Thyroid: neck is supple without adenopathy, no thyromegaly, no JVD. Respiratory: normal to inspection, normal respiratory effort, no wheezing. Breast: Slight post radiation appearance on L, R is normal, no contour changes, no worrisome asymmetry, resolved dermal edema L. Nipple normal appearance bilaterally, no inversion, no retraction, no discharge. Palpation L breast upper outer Q, mid, density c/w post surgical healing, other wise normal, no masses, no densities, no tenderness. Lymph node exam normal in the bilateral axillary, cervical, and supraclavicular regions. Cardiovascular: regular rate. Abdomen: soft, non-tender, non-distended, no organomegaly noted, no masses, no hernias, no ascites. Extremities: no edema, cyanosis, or clubbing. No deformity. Musculoskeletal: normal appearance, no deformities, normal function. Back wnl, no CVA tenderness.   Neurological: exam appropriate for age sensory and motor function wnl      Path:  Reviewed w pt    Assessment   Mass of breast, unspecified laterality  (primary encounter diagnosis)    Doing well  Post radiation breast dermal edema is resolved clinically  Continue to elevate breast and LUE  Use compression bra as directed by breast physical therapist, alternating bra styles and sleep positions discussed. Maintain a healthy diet. Maintain good hydration. Encouraged Alma to try the sleeping medication on a weekend to see how she tolerates it, prescribed by her PMD.   F/u 3 mos CBE,  F/U mammogram/US as indicated.          Manuel Choudhary MD

## 2023-03-20 ENCOUNTER — OFFICE VISIT (OUTPATIENT)
Dept: SURGERY | Facility: CLINIC | Age: 48
End: 2023-03-20

## 2023-03-20 VITALS — WEIGHT: 151 LBS | BODY MASS INDEX: 26.75 KG/M2 | HEIGHT: 63 IN

## 2023-03-20 DIAGNOSIS — N63.0 BREAST SWELLING: Primary | ICD-10-CM

## 2023-03-20 DIAGNOSIS — N63.0 SWELLING OF BREAST: Primary | ICD-10-CM

## 2023-03-20 PROCEDURE — 99214 OFFICE O/P EST MOD 30 MIN: CPT | Performed by: SURGERY

## 2023-03-20 PROCEDURE — 3008F BODY MASS INDEX DOCD: CPT | Performed by: SURGERY

## 2023-03-20 RX ORDER — MONTELUKAST SODIUM 10 MG/1
TABLET ORAL
Qty: 90 TABLET | Refills: 3 | Status: SHIPPED | OUTPATIENT
Start: 2023-03-20

## 2023-04-12 ENCOUNTER — TELEPHONE (OUTPATIENT)
Dept: PHYSICAL THERAPY | Facility: HOSPITAL | Age: 48
End: 2023-04-12

## 2023-04-13 ENCOUNTER — TELEPHONE (OUTPATIENT)
Dept: PHYSICAL THERAPY | Facility: HOSPITAL | Age: 48
End: 2023-04-13

## 2023-04-14 ENCOUNTER — OFFICE VISIT (OUTPATIENT)
Dept: PHYSICAL THERAPY | Facility: HOSPITAL | Age: 48
End: 2023-04-14
Attending: SURGERY
Payer: COMMERCIAL

## 2023-04-14 DIAGNOSIS — N63.0 BREAST SWELLING: ICD-10-CM

## 2023-04-14 PROCEDURE — 97140 MANUAL THERAPY 1/> REGIONS: CPT | Performed by: PHYSICAL THERAPIST

## 2023-04-14 PROCEDURE — 97161 PT EVAL LOW COMPLEX 20 MIN: CPT | Performed by: PHYSICAL THERAPIST

## 2023-04-21 ENCOUNTER — APPOINTMENT (OUTPATIENT)
Dept: PHYSICAL THERAPY | Facility: HOSPITAL | Age: 48
End: 2023-04-21
Attending: SURGERY
Payer: COMMERCIAL

## 2023-04-25 ENCOUNTER — OFFICE VISIT (OUTPATIENT)
Dept: PHYSICAL THERAPY | Facility: HOSPITAL | Age: 48
End: 2023-04-25
Attending: SURGERY
Payer: COMMERCIAL

## 2023-04-25 PROCEDURE — 97140 MANUAL THERAPY 1/> REGIONS: CPT | Performed by: PHYSICAL THERAPIST

## 2023-05-03 ENCOUNTER — APPOINTMENT (OUTPATIENT)
Dept: PHYSICAL THERAPY | Facility: HOSPITAL | Age: 48
End: 2023-05-03
Attending: SURGERY
Payer: COMMERCIAL

## 2023-05-05 ENCOUNTER — OFFICE VISIT (OUTPATIENT)
Dept: PHYSICAL THERAPY | Facility: HOSPITAL | Age: 48
End: 2023-05-05
Attending: SURGERY
Payer: COMMERCIAL

## 2023-05-05 PROCEDURE — 97140 MANUAL THERAPY 1/> REGIONS: CPT

## 2023-05-09 ENCOUNTER — APPOINTMENT (OUTPATIENT)
Dept: PHYSICAL THERAPY | Facility: HOSPITAL | Age: 48
End: 2023-05-09
Attending: SURGERY
Payer: COMMERCIAL

## 2023-05-10 ENCOUNTER — TELEPHONE (OUTPATIENT)
Dept: INTERNAL MEDICINE CLINIC | Facility: CLINIC | Age: 48
End: 2023-05-10

## 2023-05-10 RX ORDER — VENLAFAXINE HYDROCHLORIDE 37.5 MG/1
37.5 CAPSULE, EXTENDED RELEASE ORAL DAILY
Qty: 90 CAPSULE | Refills: 3 | Status: SHIPPED | OUTPATIENT
Start: 2023-05-10

## 2023-05-11 ENCOUNTER — APPOINTMENT (OUTPATIENT)
Dept: PHYSICAL THERAPY | Facility: HOSPITAL | Age: 48
End: 2023-05-11
Attending: SURGERY
Payer: COMMERCIAL

## 2023-05-19 ENCOUNTER — HOSPITAL ENCOUNTER (OUTPATIENT)
Dept: MAMMOGRAPHY | Facility: HOSPITAL | Age: 48
Discharge: HOME | End: 2023-05-19
Attending: SURGERY
Payer: COMMERCIAL

## 2023-05-19 ENCOUNTER — HOSPITAL ENCOUNTER (OUTPATIENT)
Dept: ULTRASOUND IMAGING | Facility: HOSPITAL | Age: 48
Discharge: HOME | End: 2023-05-19
Attending: SURGERY
Payer: COMMERCIAL

## 2023-05-19 ENCOUNTER — APPOINTMENT (OUTPATIENT)
Dept: PHYSICAL THERAPY | Facility: HOSPITAL | Age: 48
End: 2023-05-19
Attending: SURGERY
Payer: COMMERCIAL

## 2023-05-19 DIAGNOSIS — N63.0 SWELLING OF BREAST: ICD-10-CM

## 2023-05-19 PROCEDURE — 76642 ULTRASOUND BREAST LIMITED: CPT | Performed by: SURGERY

## 2023-05-19 PROCEDURE — 77065 DX MAMMO INCL CAD UNI: CPT | Performed by: SURGERY

## 2023-05-19 PROCEDURE — 77061 BREAST TOMOSYNTHESIS UNI: CPT | Performed by: SURGERY

## 2023-05-19 RX ORDER — LOSARTAN POTASSIUM 50 MG/1
TABLET ORAL
Qty: 90 TABLET | Refills: 3 | Status: SHIPPED | OUTPATIENT
Start: 2023-05-19

## 2023-05-19 NOTE — TELEPHONE ENCOUNTER
Please review. Protocol failed / Has no protocol. Requested Prescriptions   Pending Prescriptions Disp Refills    LOSARTAN 48 MG Oral Tab [Pharmacy Med Name: LOSARTAN TABS 50MG] 90 tablet 3     Sig: TAKE 1 TABLET DAILY       Hypertensive Medications Protocol Failed - 5/19/2023 12:00 AM        Failed - CMP or BMP in past 6 months     No results found for this or any previous visit (from the past 4392 hour(s)). Failed - EGFRCR or GFRNAA > 50     GFR Evaluation              Passed - In person appointment in the past 12 or next 3 months     Recent Outpatient Visits              2 weeks ago     [address], [place]    Office Visit    3 weeks ago     [address] [place], [place]    Office Visit    1 month ago Breast swelling    [facility] Krish Winn    Office Visit    2 months ago Swelling of breast    Joyce Blackburn, [address], Shari Amor MD    Office Visit    5 months ago GARY (generalized anxiety disorder)    April MD Saeed    Office Visit          Future Appointments         Provider Department Appt Notes    In 5 days Ainsley Plume, [address] [place] PPO  no c/p, no auth, 60v pcy    In 1 week Stefanie Jones 19 Hematology Oncology FOLLOW UP VISIT. CL  4M having breast bx 12/2/22    In 1 week Ainsley Plume, [address] [place] PPO  no c/p, no [place], 60v pcy    In 2 weeks Ainsley Plume, [address] [place] PPO  no c/p, no [place], 60v pcy    In 1 month Papito Dianaer, [address] [place] PPO  no c/p, no auth, 60v pcy               Passed - Last BP reading less than 140/90     BP Readings from Last 1 Encounters:  12/20/22 : 128/80                Passed - In person appointment or virtual visit in the past 6 months Recent Outpatient Visits              2 weeks ago     [place], [place]    Office Visit    3 weeks ago     [address] [place], [place]    Office Visit    1 month ago Breast swelling    [facility] Artie JodeeCon[place], [place]    Office Visit    2 months ago Swelling of breast    [address] [place], [place]    Office Visit    5 months ago GARY (generalized anxiety disorder)    Nessa Hercules MD    Office Visit          Future Appointments         Provider Department Appt Notes    In 5 days Artie Jodee, [address] [place] PPO  no c/p, no Teresa Fito, [place] pcy    In 1 week Stefanie Hatch 19 Hematology Oncology FOLLOW UP VISIT. CL  4M having breast bx 12/2/22    In 1 week Artie Jodee, [address] [place] PPO  no c/p, no Teresa Fito, 60v pcy    In 2 weeks Artie Jodee, [address] [place] PPO  no c/p, no Teresa Fito, 60v pcy    In 1 month Artie Jodee, [address] [place] PPO  no c/p, no auth, [place] pcy                  Future Appointments         Provider Department Appt Notes    In 5 days Artie Jodee, [address] [place] PPO  no c/p, no Teresa Fito, [place] pcy    In 1 week Mindi Steven MD [facility] Hematology Oncology FOLLOW UP VISIT. CL  4M having breast bx 12/2/22    In 1 week Artie Jodee, [address] [place] PPO  no c/p, no Teresa Fito, 60v pcy    In 2 weeks Artie Jodee, [address] [place] PPO  no c/p, no Teresa Fito, 60v pcy    In 1 month Artie Jodee, [address] [place] PPO  no c/p, no auth, [place] pcy           Recent Outpatient Visits              2 weeks ago     [address], [place]    Office Visit    3 weeks ago [address] [place], [place]    Office Visit    1 month ago Breast swelling    [address] [place], [place]    Office Visit    2 months ago Swelling of breast    [address] [place], [address] [address], Danelle Mg MD    Office Visit    5 months ago GARY (generalized anxiety disorder)    Stephy Ramos MD    Office Visit

## 2023-05-24 ENCOUNTER — OFFICE VISIT (OUTPATIENT)
Dept: PHYSICAL THERAPY | Facility: HOSPITAL | Age: 48
End: 2023-05-24
Attending: SURGERY
Payer: COMMERCIAL

## 2023-05-24 PROCEDURE — 97140 MANUAL THERAPY 1/> REGIONS: CPT | Performed by: PHYSICAL THERAPIST

## 2023-05-26 ENCOUNTER — APPOINTMENT (OUTPATIENT)
Dept: PHYSICAL THERAPY | Facility: HOSPITAL | Age: 48
End: 2023-05-26
Attending: SURGERY
Payer: COMMERCIAL

## 2023-05-30 ENCOUNTER — OFFICE VISIT (OUTPATIENT)
Dept: HEMATOLOGY/ONCOLOGY | Facility: HOSPITAL | Age: 48
End: 2023-05-30
Attending: INTERNAL MEDICINE
Payer: COMMERCIAL

## 2023-05-30 VITALS
BODY MASS INDEX: 26.9 KG/M2 | HEART RATE: 87 BPM | SYSTOLIC BLOOD PRESSURE: 127 MMHG | DIASTOLIC BLOOD PRESSURE: 81 MMHG | TEMPERATURE: 99 F | RESPIRATION RATE: 16 BRPM | WEIGHT: 151.81 LBS | OXYGEN SATURATION: 97 % | HEIGHT: 63 IN

## 2023-05-30 DIAGNOSIS — Z17.0 MALIGNANT NEOPLASM OF OVERLAPPING SITES OF LEFT BREAST IN FEMALE, ESTROGEN RECEPTOR POSITIVE (HCC): Primary | ICD-10-CM

## 2023-05-30 DIAGNOSIS — Z85.3 ENCOUNTER FOR FOLLOW-UP SURVEILLANCE OF BREAST CANCER: ICD-10-CM

## 2023-05-30 DIAGNOSIS — Z79.810 ENCOUNTER FOR MONITORING TAMOXIFEN THERAPY: ICD-10-CM

## 2023-05-30 DIAGNOSIS — C50.812 MALIGNANT NEOPLASM OF OVERLAPPING SITES OF LEFT BREAST IN FEMALE, ESTROGEN RECEPTOR POSITIVE (HCC): Primary | ICD-10-CM

## 2023-05-30 DIAGNOSIS — Z12.39 BREAST CANCER SCREENING OTHER THAN MAMMOGRAM: ICD-10-CM

## 2023-05-30 DIAGNOSIS — Z51.81 ENCOUNTER FOR MONITORING TAMOXIFEN THERAPY: ICD-10-CM

## 2023-05-30 DIAGNOSIS — Z08 ENCOUNTER FOR FOLLOW-UP SURVEILLANCE OF BREAST CANCER: ICD-10-CM

## 2023-05-30 PROCEDURE — 99214 OFFICE O/P EST MOD 30 MIN: CPT | Performed by: INTERNAL MEDICINE

## 2023-05-31 ENCOUNTER — APPOINTMENT (OUTPATIENT)
Dept: PHYSICAL THERAPY | Facility: HOSPITAL | Age: 48
End: 2023-05-31
Attending: SURGERY
Payer: COMMERCIAL

## 2023-06-08 ENCOUNTER — APPOINTMENT (OUTPATIENT)
Dept: PHYSICAL THERAPY | Facility: HOSPITAL | Age: 48
End: 2023-06-08
Attending: SURGERY
Payer: COMMERCIAL

## 2023-06-09 ENCOUNTER — OFFICE VISIT (OUTPATIENT)
Dept: PHYSICAL THERAPY | Facility: HOSPITAL | Age: 48
End: 2023-06-09
Attending: SURGERY
Payer: COMMERCIAL

## 2023-06-09 PROCEDURE — 97140 MANUAL THERAPY 1/> REGIONS: CPT | Performed by: PHYSICAL THERAPIST

## 2023-06-16 ENCOUNTER — APPOINTMENT (OUTPATIENT)
Dept: PHYSICAL THERAPY | Facility: HOSPITAL | Age: 48
End: 2023-06-16
Attending: SURGERY
Payer: COMMERCIAL

## 2023-06-23 ENCOUNTER — APPOINTMENT (OUTPATIENT)
Dept: PHYSICAL THERAPY | Facility: HOSPITAL | Age: 48
End: 2023-06-23
Attending: SURGERY
Payer: COMMERCIAL

## 2023-07-11 ENCOUNTER — TELEPHONE (OUTPATIENT)
Dept: INTERNAL MEDICINE CLINIC | Facility: CLINIC | Age: 48
End: 2023-07-11

## 2023-07-12 ENCOUNTER — TELEPHONE (OUTPATIENT)
Dept: GASTROENTEROLOGY | Facility: CLINIC | Age: 48
End: 2023-07-12

## 2023-07-12 NOTE — TELEPHONE ENCOUNTER
----- Message from Vicki Payton RN sent at 9/17/2018  7:59 AM CDT -----  Regarding: recall colonoscopy  Recall colon in 5 years by EBS .   Colon done 09/11/18

## 2023-07-24 ENCOUNTER — OFFICE VISIT (OUTPATIENT)
Dept: INTERNAL MEDICINE CLINIC | Facility: CLINIC | Age: 48
End: 2023-07-24

## 2023-07-24 VITALS
WEIGHT: 148 LBS | HEIGHT: 63 IN | BODY MASS INDEX: 26.22 KG/M2 | SYSTOLIC BLOOD PRESSURE: 116 MMHG | HEART RATE: 76 BPM | DIASTOLIC BLOOD PRESSURE: 76 MMHG

## 2023-07-24 DIAGNOSIS — Z12.11 COLON CANCER SCREENING: ICD-10-CM

## 2023-07-24 DIAGNOSIS — Z00.00 PHYSICAL EXAM: Primary | ICD-10-CM

## 2023-07-24 PROCEDURE — 99396 PREV VISIT EST AGE 40-64: CPT | Performed by: INTERNAL MEDICINE

## 2023-07-24 PROCEDURE — 3074F SYST BP LT 130 MM HG: CPT | Performed by: INTERNAL MEDICINE

## 2023-07-24 PROCEDURE — 3078F DIAST BP <80 MM HG: CPT | Performed by: INTERNAL MEDICINE

## 2023-07-24 PROCEDURE — 3008F BODY MASS INDEX DOCD: CPT | Performed by: INTERNAL MEDICINE

## 2023-07-24 NOTE — PROGRESS NOTES
Subjective:     Patient ID: Sally Jacinto is a 50year old female. HPI  Physical exam  Generally healthy    Left arm lymphedema  associated with hx of ER/AK positive breast cancer, node negative. Wearing compression sleeve and compression bra.   9/18/2020: Stage IA (pT1b, pN0(sn), cM0, G2, ER+, AK+, HER2-, Oncotype DX score: 20) Taking tamoxifen daily and is tolerating well. She will complete treatment with tamoxifen on November of 202 ocal control of treatment with lumpectomy and radiation therapy. History/Other:   Review of Systems   Constitutional:  Negative for activity change, chills, fatigue and fever. HENT:  Negative for ear discharge, nosebleeds, postnasal drip, rhinorrhea, sinus pressure and sore throat. Eyes:  Negative for pain, discharge and redness. Respiratory:  Negative for cough, chest tightness, shortness of breath and wheezing. Cardiovascular:  Negative for chest pain, palpitations and leg swelling. Gastrointestinal:  Negative for abdominal pain, blood in stool, constipation, diarrhea, nausea and vomiting. Genitourinary:  Negative for difficulty urinating, dysuria, frequency, hematuria and urgency. Musculoskeletal:  Negative for back pain, gait problem and joint swelling. Skin:  Negative for rash. Neurological:  Negative for syncope, weakness, light-headedness and headaches. Psychiatric/Behavioral:  Negative for dysphoric mood. The patient is not nervous/anxious. Current Outpatient Medications   Medication Sig Dispense Refill    LOSARTAN 50 MG Oral Tab TAKE 1 TABLET DAILY 90 tablet 3    venlafaxine ER 37.5 MG Oral Capsule SR 24 Hr Take 1 capsule (37.5 mg total) by mouth daily. 90 capsule 3    MONTELUKAST 10 MG Oral Tab TAKE 1 TABLET NIGHTLY 90 tablet 3    pimecrolimus (ELIDEL) 1 % External Cream Apply 1 Application. topically 2 (two) times daily.  Use bid to face as directed 60 g 5    fluticasone propionate (FLONASE) 50 MCG/ACT Nasal Suspension 2 sprays by Nasal route daily. 3 each 2    levocetirizine 5 MG Oral Tab Take 1 tablet (5 mg total) by mouth every evening. 90 tablet 2    Apremilast (OTEZLA) 30 MG Oral Tab Take 1 tablet by mouth 2 (two) times daily. 180 tablet 3    TAMOXIFEN 20 MG Oral Tab TAKE 1 TABLET DAILY 90 tablet 3    mupirocin 2 % External Ointment Apply 1 Application topically 3 (three) times daily. 30 g 0    halobetasol 0.05 % External Cream Apply bid to affected area for 10 to 14 days 100 g 0    Diclofenac Sodium 1 % External Gel Apply 2 g topically 4 (four) times daily. Apply thin layer to affected joint. 100 g 3    Azelastine HCl 0.1 % Nasal Solution 2 sprays by Nasal route 2 (two) times daily. 90 mL 0    Cholecalciferol (VITAMIN D3) 25 MCG (1000 UT) Oral Cap Take 1 tablet by mouth daily. Clobetasol Propionate 0.05 % External Foam Use bid 100 g 6    Fluocinolone Acetonide (DERMOTIC) 0.01 % Otic Oil Use qhs to ears for psoriasis 20 mL 6    Emollient (ELETONE) Apply Externally Cream Use bid 200 g 6    calcipotriene 0.005 % External Cream Apply topically.  apply by TOPICAL route 2 times every day a thin film to the affected skin areasand rub in gently and completely       Allergies:No Known Allergies    Past Medical History:   Diagnosis Date    Breast cancer [facility])     age 53    Conjunctival cyst 8/1/13    At 8 o'clock, OD    Corneal ulcer- right eye 2/17/2016    Hx of tonsillectomy 06/23/16    Hypertension     Internal hemorrhoids without complication 6/60/5182    Lymphedema     Psoriasis       Past Surgical History:   Procedure Laterality Date    COLONOSCOPY N/A 09/11/2018    Procedure: COLONOSCOPY;  Surgeon: Alanis Terrell MD;  Location: [facility] ENDO    EXCISION TURBINATE,SUBMUCOUS  06/23/2016    LUMPECTOMY LEFT Left 09/14/2020    HARMONY BIOPSY STEREO NODULE 1 SITE LEFT (CPT=19081) Left 12/02/2022    CLIP PLACED X    MOUTH SURGERY PROC UNLISTED  12/05/2011    Jaw surgery/metal implant    NEEDLE BIOPSY LEFT Left     8/14/2020    RADIATION LEFT Left     REMOVAL OF TONSILS,12+ Y/O  06/23/2016    REPAIR OF NASAL SEPTUM  06/23/2016      Family History   Problem Relation Age of Onset    Colon Cancer Father 72        Cause of death    Stroke Mother         Cause of death    Hypertension Mother     Diabetes Maternal Grandmother     Hypertension Sister     Hypertension Brother     Breast Cancer Self 53    Glaucoma Neg     Macular degeneration Neg     Ovarian Cancer Neg       Social History:   Social History     Socioeconomic History    Marital status:    Tobacco Use    Smoking status: Never     Passive exposure: Never    Smokeless tobacco: Never    Tobacco comments:     Grew up with a smoker, no present household smokers. Substance and Sexual Activity    Alcohol use: No     Alcohol/week: 0.0 standard drinks of alcohol     Comment: rarely    Drug use: No     Comment: none    Sexual activity: Yes     Partners: Male     Birth control/protection: Injection   Other Topics Concern    Caffeine Concern Yes     Comment: 16 oz soda daily    Pt has a pacemaker No    Pt has a defibrillator No    Reaction to local anesthetic No        Objective:   Physical Exam  Constitutional:       General: She is not in acute distress. Appearance: She is well-developed. She is not diaphoretic. HENT:      Head: Normocephalic and atraumatic. Right Ear: Ear canal normal.      Left Ear: Ear canal normal.      Nose: Nose normal.      Mouth/Throat:      Pharynx: No oropharyngeal exudate or posterior oropharyngeal erythema. Eyes:      General: No scleral icterus. Right eye: No discharge. Left eye: No discharge. Conjunctiva/sclera: Conjunctivae normal.      Pupils: Pupils are equal, round, and reactive to light. Cardiovascular:      Rate and Rhythm: Normal rate and regular rhythm. Heart sounds: Normal heart sounds. No murmur heard. Pulmonary:      Effort: Pulmonary effort is normal. No respiratory distress.       Breath sounds: Normal breath sounds. No wheezing. Abdominal:      General: Bowel sounds are normal.      Palpations: Abdomen is soft. There is no mass. Tenderness: There is no abdominal tenderness. There is no guarding or rebound. Hernia: No hernia is present. Musculoskeletal:         General: No tenderness. Comments: Wearing left arm sl;eeve   Skin:     General: Skin is warm and dry. Findings: No lesion or rash. Neurological:      Mental Status: She is alert. Gait: Gait normal.   Psychiatric:         Behavior: Behavior normal.         Thought Content:  Thought content normal.         Assessment & Plan:   (Z00.00) Physical exam  (primary encounter diagnosis)  Plan: CBC WITH DIFFERENTIAL WITH PLATELET, COMP         METABOLIC PANEL (14), LIPID PANEL, HEMOGLOBIN         A1C, TSH+FREE T4, URINALYSIS, ROUTINE, VITAMIN         D      Generally helathy  Hx of breast CA  no known recurrence   Being followed by oncology  Follow up with gyne for rooutine exam    Patient voiced understanding  and agrees with plan      (Z12.11) Colon cancer screening  Plan: GASTRO - INTERNAL        Colon ca screen adivsed    Patient voiced understanding  and agrees with plan          Meds This Visit:  Requested Prescriptions      No prescriptions requested or ordered in this encounter       Imaging & Referrals:  None

## 2023-07-27 NOTE — TELEPHONE ENCOUNTER
Dr. Mesfin Zamora,      Please sign off on form if you agree to: Re Certification due to ongoing breast cancer treatment/flare ups; 1-2 flare ups per month and 1-5 appts per month  (Please place your signature on the first page only)    -From your Inbasket, Highlight the patient and click Chart   -Double click the 8/30/01 Forms Completion telephone encounter  -Scroll down to the Media section   -Click the blue Hyperlink: ERIN Zamora 8/17/31  -Click Acknowledge located at the bottom right corner (if you do not see acknowledge, try maximizing your window)   -Drag the mouse into the blank space of the document and a + sign will appear. Left click to   electronically sign the document.      Thank you,    Vergie Primrose

## 2023-07-28 ENCOUNTER — LAB ENCOUNTER (OUTPATIENT)
Dept: LAB | Age: 48
End: 2023-07-28
Attending: INTERNAL MEDICINE
Payer: COMMERCIAL

## 2023-07-28 DIAGNOSIS — Z00.00 PHYSICAL EXAM: ICD-10-CM

## 2023-07-28 LAB
ALBUMIN SERPL-MCNC: 3.4 G/DL (ref 3.4–5)
ALBUMIN/GLOB SERPL: 1.1 {RATIO} (ref 1–2)
ALP LIVER SERPL-CCNC: 42 U/L
ALT SERPL-CCNC: 14 U/L
ANION GAP SERPL CALC-SCNC: 4 MMOL/L (ref 0–18)
AST SERPL-CCNC: 10 U/L (ref 15–37)
BASOPHILS # BLD AUTO: 0.04 X10(3) UL (ref 0–0.2)
BASOPHILS NFR BLD AUTO: 0.6 %
BILIRUB SERPL-MCNC: 0.5 MG/DL (ref 0.1–2)
BILIRUB UR QL STRIP.AUTO: NEGATIVE
BUN BLD-MCNC: 10 MG/DL (ref 7–18)
CALCIUM BLD-MCNC: 8.6 MG/DL (ref 8.5–10.1)
CHLORIDE SERPL-SCNC: 112 MMOL/L (ref 98–112)
CHOLEST SERPL-MCNC: 177 MG/DL (ref ?–200)
CO2 SERPL-SCNC: 23 MMOL/L (ref 21–32)
COLOR UR AUTO: YELLOW
CREAT BLD-MCNC: 0.89 MG/DL
EGFRCR SERPLBLD CKD-EPI 2021: 79 ML/MIN/1.73M2 (ref 60–?)
EOSINOPHIL # BLD AUTO: 0.13 X10(3) UL (ref 0–0.7)
EOSINOPHIL NFR BLD AUTO: 1.9 %
ERYTHROCYTE [DISTWIDTH] IN BLOOD BY AUTOMATED COUNT: 13.9 %
EST. AVERAGE GLUCOSE BLD GHB EST-MCNC: 128 MG/DL (ref 68–126)
FASTING PATIENT LIPID ANSWER: YES
FASTING STATUS PATIENT QL REPORTED: YES
GLOBULIN PLAS-MCNC: 3.1 G/DL (ref 2.8–4.4)
GLUCOSE BLD-MCNC: 88 MG/DL (ref 70–99)
GLUCOSE UR STRIP.AUTO-MCNC: NEGATIVE MG/DL
HBA1C MFR BLD: 6.1 % (ref ?–5.7)
HCT VFR BLD AUTO: 38.3 %
HDLC SERPL-MCNC: 57 MG/DL (ref 40–59)
HGB BLD-MCNC: 11.9 G/DL
IMM GRANULOCYTES # BLD AUTO: 0.03 X10(3) UL (ref 0–1)
IMM GRANULOCYTES NFR BLD: 0.4 %
KETONES UR STRIP.AUTO-MCNC: NEGATIVE MG/DL
LDLC SERPL CALC-MCNC: 103 MG/DL (ref ?–100)
LYMPHOCYTES # BLD AUTO: 2.96 X10(3) UL (ref 1–4)
LYMPHOCYTES NFR BLD AUTO: 43.1 %
MCH RBC QN AUTO: 28.3 PG (ref 26–34)
MCHC RBC AUTO-ENTMCNC: 31.1 G/DL (ref 31–37)
MCV RBC AUTO: 91 FL
MONOCYTES # BLD AUTO: 0.47 X10(3) UL (ref 0.1–1)
MONOCYTES NFR BLD AUTO: 6.9 %
NEUTROPHILS # BLD AUTO: 3.23 X10 (3) UL (ref 1.5–7.7)
NEUTROPHILS # BLD AUTO: 3.23 X10(3) UL (ref 1.5–7.7)
NEUTROPHILS NFR BLD AUTO: 47.1 %
NITRITE UR QL STRIP.AUTO: NEGATIVE
NONHDLC SERPL-MCNC: 120 MG/DL (ref ?–130)
OSMOLALITY SERPL CALC.SUM OF ELEC: 286 MOSM/KG (ref 275–295)
PH UR STRIP.AUTO: 5 [PH] (ref 5–8)
PLATELET # BLD AUTO: 318 10(3)UL (ref 150–450)
POTASSIUM SERPL-SCNC: 4.3 MMOL/L (ref 3.5–5.1)
PROT SERPL-MCNC: 6.5 G/DL (ref 6.4–8.2)
PROT UR STRIP.AUTO-MCNC: NEGATIVE MG/DL
RBC # BLD AUTO: 4.21 X10(6)UL
RBC #/AREA URNS AUTO: >10 /HPF
SODIUM SERPL-SCNC: 139 MMOL/L (ref 136–145)
SP GR UR STRIP.AUTO: 1.02 (ref 1–1.03)
T4 FREE SERPL-MCNC: 1 NG/DL (ref 0.8–1.7)
TRIGL SERPL-MCNC: 94 MG/DL (ref 30–149)
TSI SER-ACNC: 2.03 MIU/ML (ref 0.36–3.74)
UROBILINOGEN UR STRIP.AUTO-MCNC: <2 MG/DL
VIT D+METAB SERPL-MCNC: 40.3 NG/ML (ref 30–100)
VLDLC SERPL CALC-MCNC: 16 MG/DL (ref 0–30)
WBC # BLD AUTO: 6.9 X10(3) UL (ref 4–11)

## 2023-07-28 PROCEDURE — 84443 ASSAY THYROID STIM HORMONE: CPT

## 2023-07-28 PROCEDURE — 81001 URINALYSIS AUTO W/SCOPE: CPT

## 2023-07-28 PROCEDURE — 82306 VITAMIN D 25 HYDROXY: CPT

## 2023-07-28 PROCEDURE — 36415 COLL VENOUS BLD VENIPUNCTURE: CPT

## 2023-07-28 PROCEDURE — 83036 HEMOGLOBIN GLYCOSYLATED A1C: CPT

## 2023-07-28 PROCEDURE — 84439 ASSAY OF FREE THYROXINE: CPT

## 2023-07-28 PROCEDURE — 85025 COMPLETE CBC W/AUTO DIFF WBC: CPT

## 2023-07-28 PROCEDURE — 80061 LIPID PANEL: CPT

## 2023-07-28 PROCEDURE — 80053 COMPREHEN METABOLIC PANEL: CPT

## 2023-07-30 PROBLEM — Z85.3 HISTORY OF LEFT BREAST CANCER: Status: ACTIVE | Noted: 2023-07-30

## 2023-07-31 NOTE — TELEPHONE ENCOUNTER
ERIN completed and faxed to [facility] of [address]  Sent pt [employer] message

## 2023-08-12 ENCOUNTER — LAB ENCOUNTER (OUTPATIENT)
Dept: LAB | Age: 48
End: 2023-08-12
Attending: INTERNAL MEDICINE
Payer: COMMERCIAL

## 2023-08-12 DIAGNOSIS — D64.9 ANEMIA, UNSPECIFIED TYPE: ICD-10-CM

## 2023-08-12 DIAGNOSIS — R82.90 ABNORMAL URINALYSIS: ICD-10-CM

## 2023-08-12 LAB
BASOPHILS # BLD AUTO: 0.05 X10(3) UL (ref 0–0.2)
BASOPHILS NFR BLD AUTO: 0.7 %
BILIRUB UR QL STRIP.AUTO: NEGATIVE
EOSINOPHIL # BLD AUTO: 0.09 X10(3) UL (ref 0–0.7)
EOSINOPHIL NFR BLD AUTO: 1.3 %
ERYTHROCYTE [DISTWIDTH] IN BLOOD BY AUTOMATED COUNT: 14 %
FOLATE SERPL-MCNC: 10.7 NG/ML (ref 8.7–?)
GLUCOSE UR STRIP.AUTO-MCNC: NEGATIVE MG/DL
HCT VFR BLD AUTO: 38.8 %
HGB BLD-MCNC: 12.4 G/DL
IMM GRANULOCYTES # BLD AUTO: 0.02 X10(3) UL (ref 0–1)
IMM GRANULOCYTES NFR BLD: 0.3 %
IRON SATN MFR SERPL: 20 %
IRON SERPL-MCNC: 79 UG/DL
KETONES UR STRIP.AUTO-MCNC: NEGATIVE MG/DL
LYMPHOCYTES # BLD AUTO: 2.79 X10(3) UL (ref 1–4)
LYMPHOCYTES NFR BLD AUTO: 40 %
MCH RBC QN AUTO: 29 PG (ref 26–34)
MCHC RBC AUTO-ENTMCNC: 32 G/DL (ref 31–37)
MCV RBC AUTO: 90.7 FL
MONOCYTES # BLD AUTO: 0.56 X10(3) UL (ref 0.1–1)
MONOCYTES NFR BLD AUTO: 8 %
NEUTROPHILS # BLD AUTO: 3.46 X10 (3) UL (ref 1.5–7.7)
NEUTROPHILS # BLD AUTO: 3.46 X10(3) UL (ref 1.5–7.7)
NEUTROPHILS NFR BLD AUTO: 49.7 %
NITRITE UR QL STRIP.AUTO: NEGATIVE
PH UR STRIP.AUTO: 5 [PH] (ref 5–8)
PLATELET # BLD AUTO: 336 10(3)UL (ref 150–450)
PROT UR STRIP.AUTO-MCNC: NEGATIVE MG/DL
RBC # BLD AUTO: 4.28 X10(6)UL
SP GR UR STRIP.AUTO: 1.02 (ref 1–1.03)
TIBC SERPL-MCNC: 395 UG/DL (ref 240–450)
TRANSFERRIN SERPL-MCNC: 265 MG/DL (ref 200–360)
UROBILINOGEN UR STRIP.AUTO-MCNC: <2 MG/DL
VIT B12 SERPL-MCNC: 565 PG/ML (ref 193–986)
WBC # BLD AUTO: 7 X10(3) UL (ref 4–11)
WBC CLUMPS UR QL AUTO: PRESENT /HPF

## 2023-08-12 PROCEDURE — 83550 IRON BINDING TEST: CPT

## 2023-08-12 PROCEDURE — 81001 URINALYSIS AUTO W/SCOPE: CPT

## 2023-08-12 PROCEDURE — 36415 COLL VENOUS BLD VENIPUNCTURE: CPT

## 2023-08-12 PROCEDURE — 83540 ASSAY OF IRON: CPT

## 2023-08-12 PROCEDURE — 85025 COMPLETE CBC W/AUTO DIFF WBC: CPT

## 2023-08-12 PROCEDURE — 82607 VITAMIN B-12: CPT

## 2023-08-12 PROCEDURE — 82746 ASSAY OF FOLIC ACID SERUM: CPT

## 2023-08-12 PROCEDURE — 87086 URINE CULTURE/COLONY COUNT: CPT

## 2023-09-29 ENCOUNTER — OFFICE VISIT (OUTPATIENT)
Dept: HEMATOLOGY/ONCOLOGY | Facility: HOSPITAL | Age: 48
End: 2023-09-29
Attending: INTERNAL MEDICINE
Payer: COMMERCIAL

## 2023-09-29 VITALS
TEMPERATURE: 99 F | BODY MASS INDEX: 26.63 KG/M2 | SYSTOLIC BLOOD PRESSURE: 130 MMHG | OXYGEN SATURATION: 95 % | HEIGHT: 63 IN | DIASTOLIC BLOOD PRESSURE: 90 MMHG | RESPIRATION RATE: 16 BRPM | HEART RATE: 86 BPM | WEIGHT: 150.31 LBS

## 2023-09-29 DIAGNOSIS — Z79.810 ENCOUNTER FOR MONITORING TAMOXIFEN THERAPY: ICD-10-CM

## 2023-09-29 DIAGNOSIS — Z51.81 ENCOUNTER FOR MONITORING TAMOXIFEN THERAPY: ICD-10-CM

## 2023-09-29 DIAGNOSIS — C50.812 MALIGNANT NEOPLASM OF OVERLAPPING SITES OF LEFT BREAST IN FEMALE, ESTROGEN RECEPTOR POSITIVE: Primary | ICD-10-CM

## 2023-09-29 DIAGNOSIS — Z17.0 MALIGNANT NEOPLASM OF OVERLAPPING SITES OF LEFT BREAST IN FEMALE, ESTROGEN RECEPTOR POSITIVE: Primary | ICD-10-CM

## 2023-09-29 PROCEDURE — 99214 OFFICE O/P EST MOD 30 MIN: CPT | Performed by: NURSE PRACTITIONER

## 2023-10-24 DIAGNOSIS — C50.812 MALIGNANT NEOPLASM OF OVERLAPPING SITES OF LEFT BREAST IN FEMALE, ESTROGEN RECEPTOR POSITIVE  (HCC): ICD-10-CM

## 2023-10-24 DIAGNOSIS — Z17.0 MALIGNANT NEOPLASM OF OVERLAPPING SITES OF LEFT BREAST IN FEMALE, ESTROGEN RECEPTOR POSITIVE  (HCC): ICD-10-CM

## 2023-10-24 RX ORDER — TAMOXIFEN CITRATE 20 MG/1
20 TABLET ORAL DAILY
Qty: 90 TABLET | Refills: 3 | Status: SHIPPED | OUTPATIENT
Start: 2023-10-24

## 2023-11-03 ENCOUNTER — NURSE TRIAGE (OUTPATIENT)
Dept: INTERNAL MEDICINE CLINIC | Facility: CLINIC | Age: 48
End: 2023-11-03

## 2023-11-03 ENCOUNTER — OFFICE VISIT (OUTPATIENT)
Dept: INTERNAL MEDICINE CLINIC | Facility: CLINIC | Age: 48
End: 2023-11-03
Payer: COMMERCIAL

## 2023-11-03 VITALS
OXYGEN SATURATION: 97 % | HEIGHT: 63 IN | HEART RATE: 77 BPM | WEIGHT: 152 LBS | SYSTOLIC BLOOD PRESSURE: 130 MMHG | BODY MASS INDEX: 26.93 KG/M2 | DIASTOLIC BLOOD PRESSURE: 89 MMHG

## 2023-11-03 DIAGNOSIS — J32.9 RHINOSINUSITIS: Primary | ICD-10-CM

## 2023-11-03 PROCEDURE — 99214 OFFICE O/P EST MOD 30 MIN: CPT

## 2023-11-03 PROCEDURE — 3079F DIAST BP 80-89 MM HG: CPT

## 2023-11-03 PROCEDURE — 3008F BODY MASS INDEX DOCD: CPT

## 2023-11-03 PROCEDURE — 3075F SYST BP GE 130 - 139MM HG: CPT

## 2023-11-03 RX ORDER — LEVOCETIRIZINE DIHYDROCHLORIDE 5 MG/1
5 TABLET, FILM COATED ORAL EVERY EVENING
Qty: 90 TABLET | Refills: 2 | Status: SHIPPED | OUTPATIENT
Start: 2023-11-03

## 2023-11-03 RX ORDER — FLUTICASONE PROPIONATE 50 MCG
2 SPRAY, SUSPENSION (ML) NASAL DAILY
Qty: 3 EACH | Refills: 2 | Status: SHIPPED | OUTPATIENT
Start: 2023-11-03

## 2023-11-03 NOTE — TELEPHONE ENCOUNTER
Please reply to pool: EM RN TRIAGE  Action Requested: Summary for Provider     []  Critical Lab, Recommendations Needed  [] Need Additional Advice  [x]   FYI    []   Need Orders  [] Need Medications Sent to Pharmacy  []  Other     SUMMARY: Patient contacts clinic reporting sinus congestion and post nasal drip x 5 days. Had fever of 100.6 which has resolved. Thick sinus drainage, and head pressure. Throat is sore from PND. Denies headache, vision loss, dizziness, lightheadedness, chest congestion, cough wheezing, chest pain or shortness of breath. She has some pain radiating to right axilla. Acute visit booked today.       Reason for call: Acute  Onset: Data Unavailable                         Reason for Disposition   Patient wants to be seen    Protocols used: Sinus Pain and Congestion-A-[place]

## 2023-11-17 ENCOUNTER — HOSPITAL ENCOUNTER (OUTPATIENT)
Dept: MAMMOGRAPHY | Facility: HOSPITAL | Age: 48
Discharge: HOME | End: 2023-11-17
Attending: INTERNAL MEDICINE
Payer: COMMERCIAL

## 2023-11-17 DIAGNOSIS — C50.812 MALIGNANT NEOPLASM OF OVERLAPPING SITES OF LEFT BREAST IN FEMALE, ESTROGEN RECEPTOR POSITIVE: ICD-10-CM

## 2023-11-17 DIAGNOSIS — Z17.0 MALIGNANT NEOPLASM OF OVERLAPPING SITES OF LEFT BREAST IN FEMALE, ESTROGEN RECEPTOR POSITIVE: ICD-10-CM

## 2023-11-17 PROCEDURE — 77062 BREAST TOMOSYNTHESIS BI: CPT | Performed by: INTERNAL MEDICINE

## 2023-11-17 PROCEDURE — 77066 DX MAMMO INCL CAD BI: CPT | Performed by: INTERNAL MEDICINE

## 2023-11-29 ENCOUNTER — TELEPHONE (OUTPATIENT)
Dept: GASTROENTEROLOGY | Facility: CLINIC | Age: 48
End: 2023-11-29

## 2023-11-29 ENCOUNTER — OFFICE VISIT (OUTPATIENT)
Dept: GASTROENTEROLOGY | Facility: CLINIC | Age: 48
End: 2023-11-29

## 2023-11-29 VITALS
BODY MASS INDEX: 26.93 KG/M2 | WEIGHT: 152 LBS | DIASTOLIC BLOOD PRESSURE: 84 MMHG | SYSTOLIC BLOOD PRESSURE: 124 MMHG | HEIGHT: 63 IN

## 2023-11-29 DIAGNOSIS — Z12.11 SCREEN FOR COLON CANCER: Primary | ICD-10-CM

## 2023-11-29 DIAGNOSIS — Z12.11 ENCOUNTER FOR SCREENING COLONOSCOPY: Primary | ICD-10-CM

## 2023-11-29 DIAGNOSIS — Z80.0 FAMILY HISTORY OF COLON CANCER: ICD-10-CM

## 2023-11-29 PROCEDURE — 3008F BODY MASS INDEX DOCD: CPT | Performed by: INTERNAL MEDICINE

## 2023-11-29 PROCEDURE — S0285 CNSLT BEFORE SCREEN COLONOSC: HCPCS | Performed by: INTERNAL MEDICINE

## 2023-11-29 PROCEDURE — 3079F DIAST BP 80-89 MM HG: CPT | Performed by: INTERNAL MEDICINE

## 2023-11-29 PROCEDURE — 3074F SYST BP LT 130 MM HG: CPT | Performed by: INTERNAL MEDICINE

## 2023-11-29 RX ORDER — SODIUM, POTASSIUM,MAG SULFATES 17.5-3.13G
SOLUTION, RECONSTITUTED, ORAL ORAL
Qty: 1 EACH | Refills: 0 | Status: SHIPPED | OUTPATIENT
Start: 2023-11-29

## 2023-11-29 NOTE — PATIENT INSTRUCTIONS
# High risk screening, father with colon CA  62s     Recommend:  1. Schedule colonoscopy with MAC [Diagnosis: screening colonoscopy, high risk ]    2.  bowel prep from pharmacy (split suprep or miralax gatorade)    3. Continue all medications for procedure except    ** If MAC @ [facility]/NE:    - NO alcohol, recreational drugs nor erectile dysfunction mediations 72 hours before procedure(s)   - NO herbal supplements or weight loss medications x 7 days prior to the procedure(s)    ** If MAC @ [facility] or IV twilight - continue all medications as prescribed    4. Read all bowel prep instructions carefully    5. AVOID seeds, nuts, popcorn, raw fruits and vegetables (cooked is okay) for 2-3 days before procedure      >>>Please note: if you were prescribed Suprep for the bowel prep and it is too expensive or not covered by insurance, it is okay to substitute Trilyte (or any similar generic prep). This can be done by notifying the pharmacy or calling our office.

## 2023-11-29 NOTE — TELEPHONE ENCOUNTER
Scheduled for:  Colonoscopy [id number]  Provider Name:  Dr. Gayatri Ramos  Date:  3/14/2024  Location:  [facility]  Sedation:  MAC  Time:  12:30 pm, arrival 11:30 am  Prep:  Suprep (if too expensive then Miralax/Gatorade)  Meds/Allergies Reconciled?:  Physician reviewed  Diagnosis with codes:  Screening for colon cancer Z12.11; Family hx of colon cancer Z80.0  Was patient informed to call insurance with codes (Y/N):  Yes  Referral sent?:  Referral was sent at the time of electronic surgical scheduling. [facility] or [id number] notified?:  I sent an electronic request to Endo Scheduling and received a confirmation today. Medication Orders:  N/A  Misc Orders:  N/A     Further instructions given by staff:  Prep instructions were given to pt in the office, pt verbalized understanding.

## 2023-12-04 ENCOUNTER — OFFICE VISIT (OUTPATIENT)
Dept: HEMATOLOGY/ONCOLOGY | Facility: HOSPITAL | Age: 48
End: 2023-12-04
Attending: INTERNAL MEDICINE
Payer: COMMERCIAL

## 2023-12-04 VITALS
DIASTOLIC BLOOD PRESSURE: 83 MMHG | TEMPERATURE: 99 F | WEIGHT: 152.5 LBS | RESPIRATION RATE: 16 BRPM | SYSTOLIC BLOOD PRESSURE: 123 MMHG | HEART RATE: 86 BPM | OXYGEN SATURATION: 96 % | BODY MASS INDEX: 27.02 KG/M2 | HEIGHT: 62.99 IN

## 2023-12-04 DIAGNOSIS — Z79.810 ENCOUNTER FOR MONITORING TAMOXIFEN THERAPY: ICD-10-CM

## 2023-12-04 DIAGNOSIS — Z51.81 ENCOUNTER FOR MONITORING TAMOXIFEN THERAPY: ICD-10-CM

## 2023-12-04 DIAGNOSIS — C50.812 MALIGNANT NEOPLASM OF OVERLAPPING SITES OF LEFT BREAST IN FEMALE, ESTROGEN RECEPTOR POSITIVE: Primary | ICD-10-CM

## 2023-12-04 DIAGNOSIS — Z85.3 ENCOUNTER FOR FOLLOW-UP SURVEILLANCE OF BREAST CANCER: ICD-10-CM

## 2023-12-04 DIAGNOSIS — Z17.0 MALIGNANT NEOPLASM OF OVERLAPPING SITES OF LEFT BREAST IN FEMALE, ESTROGEN RECEPTOR POSITIVE: Primary | ICD-10-CM

## 2023-12-04 DIAGNOSIS — Z08 ENCOUNTER FOR FOLLOW-UP SURVEILLANCE OF BREAST CANCER: ICD-10-CM

## 2023-12-04 PROCEDURE — 99214 OFFICE O/P EST MOD 30 MIN: CPT | Performed by: INTERNAL MEDICINE

## 2023-12-04 NOTE — TELEPHONE ENCOUNTER
Refill Request for medication(s):     Last Office Visit: 12/12/22    Last Refill: 12/12/22    Pharmacy, Dosage verified: yes    Condition Update (if applicable):     Rx pended and sent to provider for approval, please advise. Thank You!

## 2023-12-05 RX ORDER — APREMILAST 30 MG/1
1 TABLET, FILM COATED ORAL 2 TIMES DAILY
Qty: 180 TABLET | Refills: 0 | Status: SHIPPED | OUTPATIENT
Start: 2023-12-05 | End: 2023-12-13

## 2023-12-13 ENCOUNTER — OFFICE VISIT (OUTPATIENT)
Dept: DERMATOLOGY CLINIC | Facility: CLINIC | Age: 48
End: 2023-12-13
Payer: COMMERCIAL

## 2023-12-13 DIAGNOSIS — L82.1 SEBORRHEIC KERATOSIS: ICD-10-CM

## 2023-12-13 DIAGNOSIS — L72.0 MILIA: ICD-10-CM

## 2023-12-13 DIAGNOSIS — L40.9 PSORIASIS: Primary | ICD-10-CM

## 2023-12-13 DIAGNOSIS — Z51.81 MEDICATION MONITORING ENCOUNTER: ICD-10-CM

## 2023-12-13 DIAGNOSIS — D23.9 BENIGN NEOPLASM OF SKIN, UNSPECIFIED LOCATION: ICD-10-CM

## 2023-12-13 PROCEDURE — 99214 OFFICE O/P EST MOD 30 MIN: CPT | Performed by: DERMATOLOGY

## 2023-12-13 RX ORDER — APREMILAST 30 MG/1
1 TABLET, FILM COATED ORAL 2 TIMES DAILY
Qty: 180 TABLET | Refills: 1 | Status: SHIPPED | OUTPATIENT
Start: 2023-12-13

## 2023-12-14 NOTE — PROGRESS NOTES
Tea Horta is a 46year old female. HPI:     CC:    Chief Complaint   Patient presents with    Psoriasis     Patient present for follow up visit - Psoriasis - LOV 12-12-23 with NK - Patient using Mortimer Ravel for treatment -No issues or concerns         Allergies:  Patient has no known allergies. HISTORY:    Past Medical History:   Diagnosis Date    Breast cancer [facility])     age 53    Conjunctival cyst 8/1/13    At 8 o'clock, OD    Corneal ulcer- right eye 2/17/2016    Hx of tonsillectomy 06/23/16    Hypertension     Internal hemorrhoids without complication 2/35/8465    Lymphedema     Psoriasis       Past Surgical History:   Procedure Laterality Date    COLONOSCOPY N/A 09/11/2018    Procedure: COLONOSCOPY;  Surgeon: Cleo Bloch, MD;  Location: [facility] ENDO    EXCISION TURBINATE,SUBMUCOUS  06/23/2016    LUMPECTOMY LEFT Left 09/14/2020    HARMONY BIOPSY STEREO NODULE 1 SITE LEFT (CPT=19081) Left 12/02/2022    CLIP PLACED X    MOUTH SURGERY PROC UNLISTED  12/05/2011    Jaw surgery/metal implant    NEEDLE BIOPSY LEFT Left     8/14/2020    RADIATION LEFT Left     REMOVAL OF TONSILS,12+ Y/O  06/23/2016    REPAIR OF NASAL SEPTUM  06/23/2016      Family History   Problem Relation Age of Onset    Colon Cancer Father 72        Cause of death    Stroke Mother         Cause of death    Hypertension Mother     Diabetes Maternal Grandmother     Hypertension Sister     Hypertension Brother     Breast Cancer Self 53    Glaucoma Neg     Macular degeneration Neg     Ovarian Cancer Neg       Social History     Socioeconomic History    Marital status:    Tobacco Use    Smoking status: Never     Passive exposure: Never    Smokeless tobacco: Never    Tobacco comments:     Grew up with a smoker, no present household smokers.     Substance and Sexual Activity    Alcohol use: No     Alcohol/week: 0.0 standard drinks of alcohol     Comment: rarely    Drug use: No     Comment: none    Sexual activity: Yes     Partners: Male     Birth control/protection: Injection   Other Topics Concern    Caffeine Concern Yes     Comment: 16 oz soda daily    Pt has a pacemaker No    Pt has a defibrillator No    Breast feeding No    Reaction to local anesthetic No        Current Outpatient Medications   Medication Sig Dispense Refill    Apremilast (OTEZLA) 30 MG Oral Tab Take 1 tablet by mouth 2 (two) times daily. 180 tablet 1    Na Sulfate-K Sulfate-Mg Sulf (SUPREP BOWEL PREP KIT) 17.5-3.13-1.6 GM/177ML Oral Solution Take as directed 1 each 0    levocetirizine 5 MG Oral Tab Take 1 tablet (5 mg total) by mouth every evening. 90 tablet 2    fluticasone propionate (FLONASE) 50 MCG/ACT Nasal Suspension 2 sprays by Nasal route daily. 3 each 2    tamoxifen 20 MG Oral Tab Take 1 tablet (20 mg total) by mouth daily. 90 tablet 3    LOSARTAN 50 MG Oral Tab TAKE 1 TABLET DAILY 90 tablet 3    venlafaxine ER 37.5 MG Oral Capsule SR 24 Hr Take 1 capsule (37.5 mg total) by mouth daily. 90 capsule 3    MONTELUKAST 10 MG Oral Tab TAKE 1 TABLET NIGHTLY 90 tablet 3    pimecrolimus (ELIDEL) 1 % External Cream Apply 1 Application. topically 2 (two) times daily. Use bid to face as directed 60 g 5    mupirocin 2 % External Ointment Apply 1 Application topically 3 (three) times daily. 30 g 0    halobetasol 0.05 % External Cream Apply bid to affected area for 10 to 14 days 100 g 0    Diclofenac Sodium 1 % External Gel Apply 2 g topically 4 (four) times daily. Apply thin layer to affected joint. 100 g 3    Azelastine HCl 0.1 % Nasal Solution 2 sprays by Nasal route 2 (two) times daily. 90 mL 0    Cholecalciferol (VITAMIN D3) 25 MCG (1000 UT) Oral Cap Take 1 tablet by mouth daily. Clobetasol Propionate 0.05 % External Foam Use bid 100 g 6    Fluocinolone Acetonide (DERMOTIC) 0.01 % Otic Oil Use qhs to ears for psoriasis 20 mL 6    Emollient (ELETONE) Apply Externally Cream Use bid 200 g 6    calcipotriene 0.005 % External Cream Apply topically.  apply by TOPICAL route 2 times every day a thin film to the affected skin areasand rub in gently and completely       Allergies:   No Known Allergies    Past Medical History:   Diagnosis Date    Breast cancer ([facility] [address]     age 53    Conjunctival cyst 8/1/13    At 8 o'clock, OD    Corneal ulcer- right eye 2/17/2016    Hx of tonsillectomy 06/23/16    Hypertension     Internal hemorrhoids without complication 0/92/9252    Lymphedema     Psoriasis      Past Surgical History:   Procedure Laterality Date    COLONOSCOPY N/A 09/11/2018    Procedure: COLONOSCOPY;  Surgeon: Richy Khan MD;  Location: [facility] ENDO    EXCISION TURBINATE,SUBMUCOUS  06/23/2016    LUMPECTOMY LEFT Left 09/14/2020    HARMONY BIOPSY STEREO NODULE 1 SITE LEFT (CPT=19081) Left 12/02/2022    CLIP PLACED X    MOUTH SURGERY PROC UNLISTED  12/05/2011    Jaw surgery/metal implant    NEEDLE BIOPSY LEFT Left     8/14/2020    RADIATION LEFT Left     REMOVAL OF TONSILS,12+ Y/O  06/23/2016    REPAIR OF NASAL SEPTUM  06/23/2016     Social History     Socioeconomic History    Marital status:      Spouse name: Not on file    Number of children: Not on file    Years of education: Not on file    Highest education level: Not on file   Occupational History    Not on file   Tobacco Use    Smoking status: Never     Passive exposure: Never    Smokeless tobacco: Never    Tobacco comments:     Grew up with a smoker, no present household smokers.     Vaping Use    Vaping Use: Not on file   Substance and Sexual Activity    Alcohol use: No     Alcohol/week: 0.0 standard drinks of alcohol     Comment: rarely    Drug use: No     Comment: none    Sexual activity: Yes     Partners: Male     Birth control/protection: Injection   Other Topics Concern     Service Not Asked    Blood Transfusions Not Asked    Caffeine Concern Yes     Comment: 16 oz soda daily    Occupational Exposure Not Asked    Hobby Hazards Not Asked    Sleep Concern Not Asked    Stress Concern Not Asked    Weight Concern Not Asked    Special Diet Not Asked    Back Care Not Asked    Exercise Not Asked    Bike Helmet Not Asked    Seat Belt Not Asked    Self-Exams Not Asked    Grew up on a farm Not Asked    History of tanning Not Asked    Outdoor occupation Not Asked    Pt has a pacemaker No    Pt has a defibrillator No    Breast feeding No    Reaction to local anesthetic No    Left Handed Not Asked    Right Handed Not Asked    Currently spends a great deal of time in the sun Not Asked    Past Sunlamp Treatments for Acne Not Asked    Hx of Spending [place] of Time in [place] Not Asked    Bad sunburns in the past Not Asked    Tanning Salons in the Past Not Asked    Hx of Radiation Treatments Not Asked    Regular use of sun block Not Asked   Social History Narrative    Not on file     Social Determinants of Health     Financial Resource Strain: Not on file   Food Insecurity: Not on file   Transportation Needs: Not on file   Physical Activity: Not on file   Stress: Not on file   Social Connections: Not on file   Housing Stability: Not on file     Family History   Problem Relation Age of Onset    Colon Cancer Father 72        Cause of death    Stroke Mother         Cause of death    Hypertension Mother     Diabetes Maternal Grandmother     Hypertension Sister     Hypertension Brother     Breast Cancer Self 44    Glaucoma Neg     Macular degeneration Neg     Ovarian Cancer Neg        There were no vitals filed for this visit. HPI:    Chief Complaint   Patient presents with    Psoriasis     Patient present for follow up visit - Psoriasis - LOV 12-12-23 with NK - Patient using Stephanie Calli for treatment -No issues or concerns     Follow-up psoriasis doing well. Last office visit 12/12/2022    Pt with history of breast cancer s/p new biopsy on tamoxifen post surgery radiation    Doing well on Otezla essentially clear. Flaring now, taking every day once, pharmacy refused RF, no PA request sent. Had been clear on bid. Has had minimal outbreaks. Scalp especially doing well. Nails still a concern. No significant problems with the [place]. Psoriasis occasionally uses topicals. Overall good control with Otezla no significant side effects  No significant arthritis. Oral erosive gingivitis /LP triggered by Humira in the past, ok currently. History of atypical nevus abdomen    Biopsy benign keratosis right canthus lateral previously    Past notes/ records and appropriate/relevant lab results including pathology and past body maps reviewed. Updated and new information noted in current visit. Patient presents with concerns above. Patient has been in their usual state of health. History, medications, allergies reviewed as noted. ROS:  Denies any other systemic complaints. No new or changeing lesions other than noted above. No fevers, chills, night sweats, unusual sun sensitivity. No other skin complaints. History, medications, allergies reviewed as noted. Physical Examination:     Well-developed well-nourished patient alert oriented in no acute distress. Exam performed, including scalp, head, neck, face,nails, hair, external eyes, including conjunctival mucosa, eyelids, lips external ears, chest, arms, legs, palms nails. Multiple light to medium brown, well marginated, uniformly pigmented, macules and papules 6 mm and less scattered on exam. pigmented lesions examined with dermoscopy benign-appearing patterns. Waxy tannish keratotic papules scattered, cherry-red vascular papules scattered. See map today's date for lesions noted . Otherwise remarkable for lesions as noted on map. See details of examination  See Assessment /Plan for additional history and physical exam also:    Assessment / plan:    No orders of the defined types were placed in this encounter.       Meds & Refills for this Visit:  Requested Prescriptions     Signed Prescriptions Disp Refills    Apremilast (OTEZLA) 30 MG Oral Tab 180 tablet 1 Sig: Take 1 tablet by mouth 2 (two) times daily. Encounter Diagnoses   Name Primary? Psoriasis Yes    Medication monitoring encounter     [place]     Seborrheic keratosis     Benign neoplasm of skin, unspecified location        See details on map. Remarkable for:    Biopsy visit right lateral canthus seborrheic keratosis    Continue to observe atypical nevus site on abdomen. Continue observation atypical nevus    Few plaques on elbows minimal hands, minimal scalp. Occasional flareups uses topicals intermittently Derma-Smoothe works well nails with ridges and pits, nailfolds normal.  Erythema, patchy scalp over the forehead brows and medial cheeks consistent with psoriasis. New concern since decreasing Otezla  Ears, have been ok now  Psoriasis. Plaque-type. Previously~10% body surface involvement. Including scalp. Elbows knees more persistent scattered lesions on trunk and extremities. Stable doing well nearly clear on Otezla topicals as needed will let us know she is a refill overall stable few patches over trunk and extremities. Scalp been pretty clear. Uses topicals quite sporadically overall happy with control. Plan to continue no problems tolerating the [facility]. dermasmoothe prn,  Add elidel for facial involvment. Continue calcipotriene cream clobetasol foam, fluocinolone otic oil    Arthritis okay nail still slowly improving onycholysis noted use elidel at nf's and will treat with medications and grid. Overall skincare, liberal use of emollients discussed. Consider more aggressive therapy if worsening. Patient will let us know how they are doing over the next several weeks. Await clinical response to above therapy. Patient with gingivitis post Humira previously.    Does not want to consider injectables at this time concern with breast cancer and injectables as well  Overall happy with control on Otezla  Discussed possible Alejandro inhibitors if loss of control of her psoriasis or possibly for worsening arthritis  With recent breast cancer, alternative biologics and immunosuppressants contraindicated. UVB contraindicated -pt at risk for additional atypical pigmented leisons/ NMSC with fair skin. Continue carful monitoring  Nevi re-check at f/u. Overall benign lesions  Unchanged papule mid back observe. Psoriasis elbows scalp. Minimal    Benign-appearing nevus on back observe. Multiple nevi lentigines unchanged no other new suspicious lesions extensive pigmented lesions prior atypical lesions more consistent follow-up especially with recent diagnosis of breast cancer follow-up every 6 months for skin exam    Labs okay A1c 6.1 liver functions normal renal function normal thyroid normal    See previous body maps  Please refer to map for specific lesions. See additional diagnoses. Pros cons of various therapies, risks benefits discussed. Pathophysiology discussed with patient. Therapeutic options reviewed. See  Medications in grid. Instructions reviewed at length. Benign nevi, seborrheic  keratoses, cherry angiomas:  Reassurance regarding other benign skin lesions. Signs and symptoms of skin cancer, ABCDE's of melanoma discussed with patient. Sunscreen use, sun protection, self exams reviewed. Followup as noted RTC routine checkup 6 mos - one year or p.r.n. The patient indicates understanding of these issues and agrees to the plan. The patient is asked to return as noted in follow-up/ above. This note was generated using Dragon voice recognition software. Please contact me regarding any confusion resulting from errors in recognition.   Encounter Times   Including precharting, reviewing chart, prior notes obtaining history: 10 minutes, medical exam :10 minutes, notes on body map, plan, counseling 10minutes My total time spent caring for the patient on the day of the encounter: 30 minutes

## 2023-12-19 ENCOUNTER — TELEPHONE (OUTPATIENT)
Dept: DERMATOLOGY CLINIC | Facility: CLINIC | Age: 48
End: 2023-12-19

## 2023-12-20 ENCOUNTER — NURSE ONLY (OUTPATIENT)
Dept: HEMATOLOGY/ONCOLOGY | Facility: HOSPITAL | Age: 48
End: 2023-12-20
Attending: GENETIC COUNSELOR, MS
Payer: COMMERCIAL

## 2023-12-20 ENCOUNTER — GENETICS ENCOUNTER (OUTPATIENT)
Dept: GENETICS | Facility: HOSPITAL | Age: 48
End: 2023-12-20
Attending: GENETIC COUNSELOR, MS
Payer: COMMERCIAL

## 2023-12-20 DIAGNOSIS — Z85.3 HISTORY OF LEFT BREAST CANCER: Primary | ICD-10-CM

## 2023-12-20 PROCEDURE — 36415 COLL VENOUS BLD VENIPUNCTURE: CPT

## 2023-12-20 PROCEDURE — 96040 HC GENETIC COUNSELING EA 30 MIN: CPT | Performed by: GENETIC COUNSELOR, MS

## 2023-12-30 ENCOUNTER — TELEPHONE (OUTPATIENT)
Dept: GENETICS | Facility: HOSPITAL | Age: 48
End: 2023-12-30

## 2023-12-30 NOTE — TELEPHONE ENCOUNTER
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 She elected to have 70 gene panel with RNA through [facility] ([facility]). Released results to her through lab's portal and will mail her a copy. Encouraged to contact me if she has questions.

## 2024-01-03 NOTE — TELEPHONE ENCOUNTER
Medication PA Requested:  Apremilast (OTEZLA) 30 MG Oral Tab                                                         CoverMyMeds Used:  Key: [id number]   Quantity: 180 tablets  Day Supply:  Sig: Take 1 tablet by mouth 2 (two) times daily   Psoriasis L40.9                               CPT code (if applicable):   Case Number/Pending Ref#:      PA expires 11/20/2022-12/25/2023      Pt called back, relayed message below. Pt was asking about if both lab orders were drawn for [facility]-both the enzyme lab and the HLB27 lab to be drawn. Results only show that one lab order was completed at that time.     Clinic staff to call patient back.

## 2024-01-06 ENCOUNTER — TELEPHONE (OUTPATIENT)
Dept: INTERNAL MEDICINE CLINIC | Facility: CLINIC | Age: 49
End: 2024-01-06

## 2024-01-06 NOTE — TELEPHONE ENCOUNTER
Received FMLA forms. No YENI/FCR. [employer] message sent to patient. Logged for processing.

## 2024-01-12 ENCOUNTER — TELEPHONE (OUTPATIENT)
Dept: DERMATOLOGY CLINIC | Facility: CLINIC | Age: 49
End: 2024-01-12

## 2024-01-12 NOTE — TELEPHONE ENCOUNTER
Apremilast (OTEZLA) 30 MG Oral Tab, Take 1 tablet by mouth 2 (two) times daily., Disp: 180 tablet, Rfl: 1    Key: [id number]

## 2024-01-16 NOTE — TELEPHONE ENCOUNTER
Medication PA Requested:  Apremilast (OTEZLA) 30 MG Oral Tab                                                         CoverMyMeds Used: Yes  Key:: [id number]  Quantity: 180 tablets  Day Supply:  Sig: Take 1 tablet by mouth 2 (two) times daily   Psoriasis L40.9                               CPT code (if applicable):   Case Number/Pending Ref#:     CMM submitted, LOV 12/13  Awaiting determination

## 2024-01-23 NOTE — TELEPHONE ENCOUNTER
Dr. Franco,     *The ACKNOWLEDGE button has been moved to the top right ribbon*    Please sign off on form if you agree to:  Intermittent FMLA (6 month re-cert), start date: 2/1/24, end date: 8/1/24   (place your signature on the first page only)    -From your Inbasket, Highlight the patient and click Chart   -Double click the 1/6/24 Forms Completion telephone encounter  -Scroll down to the Media section   -Click the blue Hyperlink:  FMLA Dr. Franco 1/23/24  -Click Acknowledge located in the top right ribbon/menu   -Drag the mouse into the blank space of the document and a + sign will appear. Left click to   electronically sign the document.     Thank you,    Genet SHIPLEY

## 2024-01-29 NOTE — TELEPHONE ENCOUNTER
FMLA forms completed & faxed to [facility] at # [phone]. Fax confirmation received.    [employer] message sent to pt.

## 2024-02-26 RX ORDER — APREMILAST 30 MG/1
1 TABLET, FILM COATED ORAL 2 TIMES DAILY
Qty: 60 TABLET | Refills: 0 | OUTPATIENT
Start: 2024-02-26

## 2024-02-26 NOTE — TELEPHONE ENCOUNTER
Current refill request refused due to refill is either a duplicate request or has active refills at the pharmacy.  Check previous templates.    Requested Prescriptions     Refused Prescriptions Disp Refills    OTEZLA 30 MG Oral Tab [Pharmacy Med Name: OTEZLA 30 MG TABLET] 60 tablet 0     Sig: TAKE 1 TABLET TWICE A DAY     Refused By: TAZ LARSEN     Reason for Refusal: Request already responded to by other means (e.g. phone or fax)

## 2024-03-14 ENCOUNTER — TELEPHONE (OUTPATIENT)
Dept: GASTROENTEROLOGY | Facility: CLINIC | Age: 49
End: 2024-03-14

## 2024-03-14 ENCOUNTER — HOSPITAL ENCOUNTER (OUTPATIENT)
Age: 49
Setting detail: HOSPITAL OUTPATIENT SURGERY
Discharge: HOME | End: 2024-03-14
Attending: INTERNAL MEDICINE | Admitting: INTERNAL MEDICINE
Payer: COMMERCIAL

## 2024-03-14 ENCOUNTER — ANESTHESIA EVENT (OUTPATIENT)
Dept: ENDOSCOPY | Age: 49
End: 2024-03-14
Payer: COMMERCIAL

## 2024-03-14 ENCOUNTER — ANESTHESIA (OUTPATIENT)
Dept: ENDOSCOPY | Age: 49
End: 2024-03-14
Payer: COMMERCIAL

## 2024-03-14 VITALS
WEIGHT: 145 LBS | HEIGHT: 63 IN | DIASTOLIC BLOOD PRESSURE: 84 MMHG | RESPIRATION RATE: 17 BRPM | SYSTOLIC BLOOD PRESSURE: 150 MMHG | OXYGEN SATURATION: 100 % | BODY MASS INDEX: 25.69 KG/M2 | HEART RATE: 60 BPM

## 2024-03-14 DIAGNOSIS — Z80.0 FAMILY HISTORY OF COLON CANCER: ICD-10-CM

## 2024-03-14 DIAGNOSIS — Z12.11 SCREEN FOR COLON CANCER: ICD-10-CM

## 2024-03-14 LAB — B-HCG UR QL: NEGATIVE

## 2024-03-14 PROCEDURE — 81025 URINE PREGNANCY TEST: CPT

## 2024-03-14 RX ORDER — NALOXONE HYDROCHLORIDE 0.4 MG/ML
0.08 INJECTION, SOLUTION INTRAMUSCULAR; INTRAVENOUS; SUBCUTANEOUS ONCE AS NEEDED
Status: DISCONTINUED | OUTPATIENT
Start: 2024-03-14 | End: 2024-03-14

## 2024-03-14 RX ORDER — SODIUM CHLORIDE, SODIUM LACTATE, POTASSIUM CHLORIDE, CALCIUM CHLORIDE 600; 310; 30; 20 MG/100ML; MG/100ML; MG/100ML; MG/100ML
INJECTION, SOLUTION INTRAVENOUS CONTINUOUS
Status: DISCONTINUED | OUTPATIENT
Start: 2024-03-14 | End: 2024-03-14

## 2024-03-14 RX ORDER — LIDOCAINE HYDROCHLORIDE 20 MG/ML
INJECTION, SOLUTION EPIDURAL; INFILTRATION; INTRACAUDAL; PERINEURAL AS NEEDED
Status: DISCONTINUED | OUTPATIENT
Start: 2024-03-14 | End: 2024-03-14 | Stop reason: SURG

## 2024-03-14 RX ADMIN — LIDOCAINE HYDROCHLORIDE 40 MG: 20 INJECTION, SOLUTION EPIDURAL; INFILTRATION; INTRACAUDAL; PERINEURAL at 12:13:00

## 2024-03-14 RX ADMIN — SODIUM CHLORIDE, SODIUM LACTATE, POTASSIUM CHLORIDE, CALCIUM CHLORIDE: 600; 310; 30; 20 INJECTION, SOLUTION INTRAVENOUS at 12:12:00

## 2024-03-14 NOTE — H&P
Pre Procedure History & Physical Examination    Patient Name: Alma Hackett  MRN: [id number]  [facility]: [id number]  YOB: 1975    Diagnosis: screening colonoscopy, family history of colon cancer    Medications Prior to Admission   Medication Sig Dispense Refill Last Dose    Apremilast (OTEZLA) 30 MG Oral Tab Take 1 tablet by mouth 2 (two) times daily. 180 tablet 1     Na Sulfate-K Sulfate-Mg Sulf (SUPREP BOWEL PREP KIT) 17.5-3.13-1.6 GM/177ML Oral Solution Take as directed 1 each 0     fluticasone propionate (FLONASE) 50 MCG/ACT Nasal Suspension 2 sprays by Nasal route daily. 3 each 2     tamoxifen 20 MG Oral Tab Take 1 tablet (20 mg total) by mouth daily. 90 tablet 3     LOSARTAN 50 MG Oral Tab TAKE 1 TABLET DAILY 90 tablet 3     venlafaxine ER 37.5 MG Oral Capsule SR 24 Hr Take 1 capsule (37.5 mg total) by mouth daily. 90 capsule 3     MONTELUKAST 10 MG Oral Tab TAKE 1 TABLET NIGHTLY 90 tablet 3     pimecrolimus (ELIDEL) 1 % External Cream Apply 1 Application. topically 2 (two) times daily. Use bid to face as directed 60 g 5     mupirocin 2 % External Ointment Apply 1 Application topically 3 (three) times daily. 30 g 0     halobetasol 0.05 % External Cream Apply bid to affected area for 10 to 14 days 100 g 0     Diclofenac Sodium 1 % External Gel Apply 2 g topically 4 (four) times daily. Apply thin layer to affected joint. 100 g 3     Cholecalciferol (VITAMIN D3) 25 MCG (1000 UT) Oral Cap Take 1 tablet by mouth daily.       Clobetasol Propionate 0.05 % External Foam Use bid 100 g 6     Fluocinolone Acetonide (DERMOTIC) 0.01 % Otic Oil Use qhs to ears for psoriasis 20 mL 6     Emollient (ELETONE) Apply Externally Cream Use bid 200 g 6     calcipotriene 0.005 % External Cream Apply topically. apply by TOPICAL route 2 times every day a thin film to the affected skin areasand rub in gently and completely       levocetirizine 5 MG Oral Tab Take 1 tablet (5 mg total) by mouth every evening. 90 tablet 2      Azelastine HCl 0.1 % Nasal Solution 2 sprays by Nasal route 2 (two) times daily. 90 mL 0      Current Facility-Administered Medications   Medication Dose Route Frequency    lactated ringers infusion   Intravenous Continuous       Allergies: No Known Allergies    Past Medical History:   Diagnosis Date    Breast cancer (HCC)     age 44    Conjunctival cyst 08/01/2013    At 8 o'clock, OD    Corneal ulcer- right eye 02/17/2016    Hx of tonsillectomy 06/23/2016    Hypertension     Internal hemorrhoids without complication 09/11/2018    Lymphedema     Psoriasis      Past Surgical History:   Procedure Laterality Date    COLONOSCOPY N/A 09/11/2018    Procedure: COLONOSCOPY;  Surgeon: Ekta Villatoro MD;  Location: [facility] ENDO    EXCISION TURBINATE,SUBMUCOUS  06/23/2016    LUMPECTOMY LEFT Left 09/14/2020    HARMONY BIOPSY STEREO NODULE 1 SITE LEFT (CPT=19081) Left 12/02/2022    CLIP PLACED X    MOUTH SURGERY PROC UNLISTED  12/05/2011    Jaw surgery/metal implant    NEEDLE BIOPSY LEFT Left     8/14/2020    RADIATION LEFT Left     REMOVAL OF TONSILS,12+ Y/O  06/23/2016    REPAIR OF NASAL SEPTUM  06/23/2016     Family History   Problem Relation Age of Onset    Colon Cancer Father 65        Cause of death    Stroke Mother         Cause of death    Hypertension Mother     Diabetes Maternal Grandmother     Hypertension Sister     Hypertension Brother     Breast Cancer Self 44    Glaucoma Neg     Macular degeneration Neg     Ovarian Cancer Neg      Social History     Tobacco Use    Smoking status: Never     Passive exposure: Never    Smokeless tobacco: Never    Tobacco comments:     Grew up with a smoker, no present household smokers.    Substance Use Topics    Alcohol use: No     Alcohol/week: 0.0 standard drinks of alcohol     Comment: rarely         ROS:   CONSTITUTIONAL:  negative for fevers, rigors  EYES:  negative for diplopia   RESPIRATORY:  negative for severe shortness of breath  CARDIOVASCULAR:  negative for  crushing sub-sternal chest pain  GASTROINTESTINAL:  see HPI  GENITOURINARY:  negative for dysuria or gross hematuria  INTEGUMENT/BREAST:  SKIN:  negative for jaundice   ALLERGIC/IMMUNOLOGIC:  negative for hay fever  ENDOCRINE:  negative for cold intolerance and heat intolerance  MUSCULOSKELETAL:  negative for joint effusion/severe erythema  BEHAVIOR/PSYCH:  negative for psychotic behavior      PHYSICAL EXAM:   Ht 5' 3\" (1.6 m)   Wt 145 lb (65.8 kg)   LMP 11/13/2023 (Approximate)   BMI 25.69 kg/m²       Gen: Patient appears comfortable and in no acute discomfort  HEENT: the sclera appears anicteric, oropharynx clear, mucus membranes appear moist  CV: regular rate and rhythm, the extremities are warm and well perfused   Lung: Moves air well; No labored breathing  Abdomen: soft, non-tender exam in all quadrants without rigidity or guarding, non-distended, no abnormal bowel sounds noted, no masses are palpated  Skin: No jaundice  Ext: no cyanosis, clubbing or edema is evident.   Neuro: Alert and interactive, and gross movements of extremities normal    I have discussed the risks and benefits and alternatives of the procedure with the patient/family.  They understand and agree to proceed with plan of care.   I have reviewed recent H&P/clinic notes  Francesca Steven MD  [facility] - Gastroenterology  3/14/2024  12:04 PM

## 2024-03-14 NOTE — ANESTHESIA PREPROCEDURE EVALUATION
Anesthesia PreOp Note    HPI:     Alma Hackett is a 48 year old female who presents for preoperative consultation requested by: Francesca Steven MD    Date of Surgery: 3/14/2024    Procedure(s):  COLONOSCOPY  Indication: Screen for colon cancer/Family history of colon cancer    Relevant Problems   No relevant active problems       NPO:                         History Review:  Patient Active Problem List    Diagnosis Date Noted   • History of left breast cancer 07/30/2023   • Encounter for monitoring tamoxifen therapy 11/15/2021   • Malignant neoplasm of overlapping sites of left breast in female, estrogen receptor positive (HCC) 09/18/2020   • Family history of colon cancer 08/14/2018   • Goiter 09/14/2009   • Psoriasis and similar disorder 05/05/2005       Past Medical History:   Diagnosis Date   • Breast cancer (HCC)     age 44   • Conjunctival cyst 08/01/2013    At 8 o'clock, OD   • Corneal ulcer- right eye 02/17/2016   • Hx of tonsillectomy 06/23/2016   • Hypertension    • Internal hemorrhoids without complication 09/11/2018   • Lymphedema    • Psoriasis        Past Surgical History:   Procedure Laterality Date   • COLONOSCOPY N/A 09/11/2018    Procedure: COLONOSCOPY;  Surgeon: Ekta Villatoro MD;  Location: [facility] ENDO   • EXCISION TURBINATE,SUBMUCOUS  06/23/2016   • LUMPECTOMY LEFT Left 09/14/2020   • HARMONY BIOPSY STEREO NODULE 1 SITE LEFT (CPT=19081) Left 12/02/2022    CLIP PLACED X   • MOUTH SURGERY PROC UNLISTED  12/05/2011    Jaw surgery/metal implant   • NEEDLE BIOPSY LEFT Left     8/14/2020   • RADIATION LEFT Left    • REMOVAL OF TONSILS,12+ Y/O  06/23/2016   • REPAIR OF NASAL SEPTUM  06/23/2016       Medications Prior to Admission   Medication Sig Dispense Refill Last Dose   • Apremilast (OTEZLA) 30 MG Oral Tab Take 1 tablet by mouth 2 (two) times daily. 180 tablet 1    • Na Sulfate-K Sulfate-Mg Sulf (SUPREP BOWEL PREP KIT) 17.5-3.13-1.6 GM/177ML Oral Solution Take as directed 1 each 0     • fluticasone propionate (FLONASE) 50 MCG/ACT Nasal Suspension 2 sprays by Nasal route daily. 3 each 2    • tamoxifen 20 MG Oral Tab Take 1 tablet (20 mg total) by mouth daily. 90 tablet 3    • LOSARTAN 50 MG Oral Tab TAKE 1 TABLET DAILY 90 tablet 3    • venlafaxine ER 37.5 MG Oral Capsule SR 24 Hr Take 1 capsule (37.5 mg total) by mouth daily. 90 capsule 3    • MONTELUKAST 10 MG Oral Tab TAKE 1 TABLET NIGHTLY 90 tablet 3    • pimecrolimus (ELIDEL) 1 % External Cream Apply 1 Application. topically 2 (two) times daily. Use bid to face as directed 60 g 5    • mupirocin 2 % External Ointment Apply 1 Application topically 3 (three) times daily. 30 g 0    • halobetasol 0.05 % External Cream Apply bid to affected area for 10 to 14 days 100 g 0    • Diclofenac Sodium 1 % External Gel Apply 2 g topically 4 (four) times daily. Apply thin layer to affected joint. 100 g 3    • Cholecalciferol (VITAMIN D3) 25 MCG (1000 UT) Oral Cap Take 1 tablet by mouth daily.      • Clobetasol Propionate 0.05 % External Foam Use bid 100 g 6    • Fluocinolone Acetonide (DERMOTIC) 0.01 % Otic Oil Use qhs to ears for psoriasis 20 mL 6    • Emollient (ELETONE) Apply Externally Cream Use bid 200 g 6    • calcipotriene 0.005 % External Cream Apply topically. apply by TOPICAL route 2 times every day a thin film to the affected skin areasand rub in gently and completely      • levocetirizine 5 MG Oral Tab Take 1 tablet (5 mg total) by mouth every evening. 90 tablet 2    • Azelastine HCl 0.1 % Nasal Solution 2 sprays by Nasal route 2 (two) times daily. 90 mL 0      Current Facility-Administered Medications Ordered in Epic   Medication Dose Route Frequency Provider Last Rate Last Admin   • lactated ringers infusion   Intravenous Continuous Francesca Steven MD         No current [facility]-ordered outpatient medications on file.       No Known Allergies    Family History   Problem Relation Age of Onset   • Colon Cancer Father 65        Cause of death   •  Stroke Mother         Cause of death   • Hypertension Mother    • Diabetes Maternal Grandmother    • Hypertension Sister    • Hypertension Brother    • Breast Cancer Self 44   • Glaucoma Neg    • Macular degeneration Neg    • Ovarian Cancer Neg      Social History     Socioeconomic History   • Marital status:    Tobacco Use   • Smoking status: Never     Passive exposure: Never   • Smokeless tobacco: Never   • Tobacco comments:     Grew up with a smoker, no present household smokers.    Substance and Sexual Activity   • Alcohol use: No     Alcohol/week: 0.0 standard drinks of alcohol     Comment: rarely   • Drug use: No     Comment: none   • Sexual activity: Yes     Partners: Male     Birth control/protection: Injection   Other Topics Concern   • Caffeine Concern Yes     Comment: 16 oz soda daily   • Pt has a pacemaker No   • Pt has a defibrillator No   • Breast feeding No   • Reaction to local anesthetic No       Available pre-op labs reviewed.             Vital Signs:  Body mass index is 25.69 kg/m².   height is 1.6 m (5' 3\") and weight is 65.8 kg (145 lb).   Vitals:    03/07/24 1450   Weight: 65.8 kg (145 lb)   Height: 1.6 m (5' 3\")        Anesthesia Evaluation     Patient summary reviewed and Nursing notes reviewed    No history of anesthetic complications   Airway   Mallampati: II  TM distance: >3 FB  Neck ROM: full  Dental - Dentition appears grossly intact     Pulmonary - normal exam   Cardiovascular - normal exam  (+) hypertension    Neuro/Psych      GI/Hepatic/Renal    (+) bowel prep    Endo/Other    Abdominal                Anesthesia Plan:   ASA:  2  Plan:   General and MAC  Plan Comments: Discussed anesthetic risks such as but, not limited to, CVA, MI, dental damage, awareness and aspiration; verbalizes understanding and wishes to proceed.  The anesthetic dental exam does not represent a complete dental/oral exam performed by a dental professional. Notations on the dental diagram can help to  highlight areas of concern and may no reflect all findings.  Informed Consent Plan and Risks Discussed With:  Patient  Discussed plan with:  Attending    I have informed Alma Hackett and/or legal guardian or family member of the nature of the anesthetic plan, benefits, risks including possible dental damage if relevant, major complications, and any alternative forms of anesthetic management.   All of the patient's questions were answered to the best of my ability. The patient desires the anesthetic management as planned.  Jennifer Lopes CRNA  3/14/2024 11:47 AM  Present on Admission:  **None**

## 2024-03-14 NOTE — DISCHARGE INSTRUCTIONS
Home Care Instructions for Colonoscopy with Sedation    Diet:  - Resume your regular diet as tolerated unless otherwise instructed.  - Start with light meals to minimize bloating.  - Do not drink alcohol today.    Medication:  - If you have questions about resuming your normal medications, please contact your Primary Care Physician.    Activities:  - Take it easy today. Do not return to work today.  - Do not drive today.  - Do not operate any machinery today (including kitchen equipment).    Colonoscopy:  - You may notice some rectal \"spotting\" (a little blood on the toilet tissue) for a day or two after the exam. This is normal.  - If you experience any rectal bleeding (not spotting), persistent tenderness or sharp severe abdominal pains, oral temperature over 100 degrees Fahrenheit, light-headedness or dizziness, or any other problems, contact your doctor.    **If unable to reach your doctor, please go to the [facility] Emergency Room**    - Your referring physician will receive a full report of your examination.  - If you do not hear from your doctor's office within two weeks of your biopsy, please call them for your results.    You may be able to see your laboratory results in Aptible between 4 and 7 business days.  In some cases, your physician may not have viewed the results before they are released to Aptible.  If you have questions regarding your results contact the physician who ordered the test/exam by phone or via Aptible by choosing \"Ask a Medical Question.\"

## 2024-03-14 NOTE — TELEPHONE ENCOUNTER
Refill requested for    Name from pharmacy: MONTELUKAST SODIUM TABS 10MG         Will file in chart as: MONTELUKAST 10 MG Oral Tab    Sig: TAKE 1 TABLET NIGHTLY    Disp: 90 tablet    Refills: 3    Start: 3/14/2024    Class: Normal    Non-formulary    Last ordered: 12 months ago (3/20/2023) by Mitchell Khan MD    Last refill: 12/15/2023    Rx #: [id number]    Corticosteroids / Long-Acting Bronchodilators [id number]/14/2024 12:07 AM   Protocol Details Appt in past 6 mos or next 3 mos      To be filled at: [facility] - [address] [phone], [phone]        Last office visit: 12/20/2022    Previously advised to follow up in Follow-up in 1 year or sooner if needed     F/U currently scheduled? Not at this time     ACTION: Imindit message sent to patient. Overdue for follow up.

## 2024-03-14 NOTE — OPERATIVE REPORT
COLONOSCOPY REPORT    Alma Hackett     10/14/1975 Age 48 year old   PCP Alexandra Franco MD Endoscopist Francesca Steven MD     Date of procedure: 24    Procedure: Colonoscopy     Pre-operative diagnosis: screening high risk given father with colon CA    Post-operative diagnosis: normal colonoscopy, hemorrhoids    Medications:  MAC sedation    Withdrawal time: 13 minutes    Procedure:  Informed consent was obtained from the patient after the risks of the procedure were discussed, including but not limited to bleeding, perforation, aspiration, infection, or possibility of a missed lesion. After discussions of the risks/benefits and alternatives to this procedure, as well as the planned sedation, the patient was placed in the left lateral decubitus position and begun on continuous blood pressure pulse oximetry and EKG monitoring and this was maintained throughout the procedure. Once an adequate level of sedation was obtained a digital rectal exam was completed. Then the lubricated tip of the [id number] diagnostic video colonoscope was inserted and advanced without difficulty to the cecum using the CO2 insufflation technique. The cecum was identified by localizing the trifold, the appendix and the ileocecal valve. Withdrawal was begun with thorough washing and careful examination of the colonic walls and folds. A routine second examination of the cecum/ascending colon was performed. Photodocumentation was obtained. The bowel prep was good. Views of the colon were excellent with washing. I then carefully withdrew the instrument from the patient who tolerated the procedure well.     Complications: none.    Findings:   1. No polyp(s) noted    2. Diverticulosis: none noted.    3. Terminal ileum: the visualized mucosa appeared normal.    4. A retroflexed view of the rectum revealed hemorrhoids.    5. The colonic mucosa throughout the colon showed normal vascular pattern, without evidence of  angioectasias or inflammation.     6. SIMONE: normal rectal tone, no masses palpated.     Recommend:  Repeat colonoscopy in 5 years given family history of colon CA. If new signs or symptoms develop, colonoscopy may need to be repeated sooner.   High fiber diet.  Monitor for blood in the stool. If having more than just tinge of blood, call office or go to the ER.    >>>If tissue was obtained and you have not received your pathology results either by phone or letter within 2 weeks, please call our office at [phone].    Specimens: none

## 2024-03-14 NOTE — TELEPHONE ENCOUNTER
Recall colon in 5 years per Dr. Steven. Colonoscopy done on 3/14/24.    Health maintenance updated and message sent to pt outreach to repeat colon in 5 years.

## 2024-03-14 NOTE — ANESTHESIA POSTPROCEDURE EVALUATION
Patient: Alma Hackett    Procedure Summary       Date: 03/14/24 Room / Location: [facility] ENDOSCOPY 01 / [facility] ENDO    Anesthesia Start: 1212 Anesthesia Stop:     Procedure: COLONOSCOPY Diagnosis:       Screen for colon cancer      Family history of colon cancer      (hemorrhoids)    Surgeons: Francesca Steven MD Anesthesiologist:     Anesthesia Type: general, MAC ASA Status: 2            Anesthesia Type: general, MAC    Vitals Value Taken Time   /78 03/14/24 1235   Temp  03/14/24 1235   Pulse 78 03/14/24 1235   Resp 18 03/14/24 1235   SpO2 99 % 03/14/24 1235       [facility] AN Post Evaluation:   Patient Evaluated in Patient location: Endo recovery.  Patient Participation: complete - patient participated  Level of Consciousness: awake and alert  Pain Score: 0  Pain Management: adequate  Airway Patency:patent  Yes    Nausea/Vomiting: none  Cardiovascular Status: acceptable  Respiratory Status: acceptable  Postoperative Hydration acceptable      Jennifer Lopes CRNA  3/14/2024 12:35 PM

## 2024-03-18 RX ORDER — MONTELUKAST SODIUM 10 MG/1
TABLET ORAL
Qty: 90 TABLET | Refills: 3 | OUTPATIENT
Start: 2024-03-18

## 2024-03-18 NOTE — TELEPHONE ENCOUNTER
Patient read mychart message. Patient needs to schedule a follow up appointment before refill for Singulair can be addressed.  Refill denied.

## 2024-04-12 ENCOUNTER — HOSPITAL ENCOUNTER (OUTPATIENT)
Dept: MRI IMAGING | Facility: HOSPITAL | Age: 49
Discharge: HOME | End: 2024-04-12
Attending: INTERNAL MEDICINE
Payer: COMMERCIAL

## 2024-04-12 DIAGNOSIS — Z12.39 BREAST CANCER SCREENING OTHER THAN MAMMOGRAM: ICD-10-CM

## 2024-04-12 DIAGNOSIS — Z17.0 MALIGNANT NEOPLASM OF OVERLAPPING SITES OF LEFT BREAST IN FEMALE, ESTROGEN RECEPTOR POSITIVE (HCC): ICD-10-CM

## 2024-04-12 DIAGNOSIS — C50.812 MALIGNANT NEOPLASM OF OVERLAPPING SITES OF LEFT BREAST IN FEMALE, ESTROGEN RECEPTOR POSITIVE (HCC): ICD-10-CM

## 2024-04-12 PROCEDURE — 77049 MRI BREAST C-+ W/CAD BI: CPT | Performed by: INTERNAL MEDICINE

## 2024-04-12 PROCEDURE — A9575 INJ GADOTERATE MEGLUMI 0.1ML: HCPCS | Performed by: INTERNAL MEDICINE

## 2024-04-12 RX ORDER — GADOTERATE MEGLUMINE 376.9 MG/ML
15 INJECTION INTRAVENOUS
Status: COMPLETED | OUTPATIENT
Start: 2024-04-12 | End: 2024-04-12

## 2024-04-12 RX ADMIN — GADOTERATE MEGLUMINE 13 ML: 376.9 INJECTION INTRAVENOUS at 11:47:00

## 2024-04-16 RX ORDER — VENLAFAXINE HYDROCHLORIDE 37.5 MG/1
37.5 CAPSULE, EXTENDED RELEASE ORAL DAILY
Qty: 90 CAPSULE | Refills: 3 | Status: SHIPPED | OUTPATIENT
Start: 2024-04-16

## 2024-04-16 NOTE — TELEPHONE ENCOUNTER
Refill passed per [facility] protocol.  Requested Prescriptions   Pending Prescriptions Disp Refills    venlafaxine ER 37.5 MG Oral Capsule SR 24 Hr 90 capsule 3     Sig: Take 1 capsule (37.5 mg total) by mouth daily.       Psychiatric Non-Scheduled (Anti-Anxiety) Passed - 4/15/2024  8:05 AM        Passed - In person appointment or virtual visit in the past 6 mos or appointment in next 3 mos     Recent Outpatient Visits              3 months ago     [facility] Hematology Oncology    Nurse Only    [facility]    4 months ago Malignant neoplasm of overlapping sites of left breast in female, estrogen receptor positive (HCC)    [facility] Hematology Oncology Lonnie Powell MD    Office Visit    4 months ago Encounter for screening colonoscopy    [facility] ([place], [place]) Francesca Steven MD    Office Visit    5 months ago Rhinosinusitis    [facility] [place] Mary Mehta APRN    Office Visit          Future Appointments         Provider Department Appt Notes    In 1 month Lonnie Powell MD [facility] Hematology Oncology FOLLOW UP VISIT.CL                    Passed - Depression Screening completed within the past 12 months           Future Appointments         Provider Department Appt Notes    In 1 month Lonnie Powell MD [facility] Hematology Oncology FOLLOW UP VISIT.CL          Recent Outpatient Visits              3 months ago     [facility] Hematology Oncology    Nurse Only    4 months ago [facility]    [facility] Visit    4 months ago Malignant neoplasm of overlapping sites of left breast in female, estrogen receptor positive (HCC)    [facility] Hematology Oncology Lonnie Powell MD    Office Visit    4 months ago Encounter  for screening colonoscopy    [place], [facility], Francesca Mueller MD    Office Visit    5 months ago Rhinosinusitis    [place], [facility], Mary Archer APRN    Office Visit

## 2024-05-14 RX ORDER — LOSARTAN POTASSIUM 50 MG/1
50 TABLET ORAL DAILY
Qty: 90 TABLET | Refills: 3 | Status: SHIPPED | OUTPATIENT
Start: 2024-05-14

## 2024-05-15 ENCOUNTER — OFFICE VISIT (OUTPATIENT)
Dept: INTERNAL MEDICINE CLINIC | Facility: CLINIC | Age: 49
End: 2024-05-15

## 2024-05-15 ENCOUNTER — NURSE TRIAGE (OUTPATIENT)
Dept: INTERNAL MEDICINE CLINIC | Facility: CLINIC | Age: 49
End: 2024-05-15

## 2024-05-15 ENCOUNTER — LAB ENCOUNTER (OUTPATIENT)
Dept: LAB | Age: 49
End: 2024-05-15

## 2024-05-15 VITALS
SYSTOLIC BLOOD PRESSURE: 118 MMHG | WEIGHT: 153 LBS | BODY MASS INDEX: 27.11 KG/M2 | HEIGHT: 63 IN | OXYGEN SATURATION: 95 % | HEART RATE: 83 BPM | DIASTOLIC BLOOD PRESSURE: 72 MMHG

## 2024-05-15 DIAGNOSIS — T14.8XXD DELAYED WOUND HEALING: ICD-10-CM

## 2024-05-15 DIAGNOSIS — L90.5 SCAR: Primary | ICD-10-CM

## 2024-05-15 DIAGNOSIS — R73.03 PREDIABETES: ICD-10-CM

## 2024-05-15 LAB
EST. AVERAGE GLUCOSE BLD GHB EST-MCNC: 117 MG/DL (ref 68–126)
HBA1C MFR BLD: 5.7 % (ref ?–5.7)

## 2024-05-15 PROCEDURE — 3078F DIAST BP <80 MM HG: CPT

## 2024-05-15 PROCEDURE — 36415 COLL VENOUS BLD VENIPUNCTURE: CPT

## 2024-05-15 PROCEDURE — 3008F BODY MASS INDEX DOCD: CPT

## 2024-05-15 PROCEDURE — 99213 OFFICE O/P EST LOW 20 MIN: CPT

## 2024-05-15 PROCEDURE — 3074F SYST BP LT 130 MM HG: CPT

## 2024-05-15 PROCEDURE — 83036 HEMOGLOBIN GLYCOSYLATED A1C: CPT

## 2024-05-15 RX ORDER — TRIAMCINOLONE ACETONIDE 1 MG/G
CREAM TOPICAL 2 TIMES DAILY PRN
Qty: 60 G | Refills: 3 | Status: SHIPPED | OUTPATIENT
Start: 2024-05-15

## 2024-05-15 NOTE — TELEPHONE ENCOUNTER
Action Requested: Summary for Provider     []  Critical Lab, Recommendations Needed  [] Need Additional Advice  [x]   FYI    []   Need Orders  [] Need Medications Sent to Pharmacy  []  Other     SUMMARY: Patient scheduled for an appointment today at 3pm with FERMÍN Mehta.    Patient had a colonoscopy 3/14/24 and during procedure had blood pressure cuff on left thigh. Patient has had a hard bruise on her thigh since procedure. Denies pain or swelling. No shortness of breath, bleeding or warm to touch.     Reason for call: Contusion (Left thigh)  Onset: 2 months.     Reason for Disposition   Patient wants to be seen    Protocols used: Bruises-A-OH

## 2024-05-15 NOTE — PROGRESS NOTES
Subjective:   Alma Hackett is a 48 year old female who presents for Bruising (Bruising on right thigh for 2 months)     Had blood pressure checked on her thigh two months ago and has a welt on her thigh since then   They checked it when she was sedated for her colonoscopy so she did not know if it was painful at the time or squeezing very hard, but started having pain when she got home  Recently if she gets a bruise like from a blood draw, it will stay for longer than she remembers - about a week    Has had surgeries in the past and was never told she had delayed clotting or issues with bleeding  Has seen dermatology in the past - Dr. Aden and Roscoe Livingston, will follow-up with them if no improvement with steroid cream    History/Other:    Chief Complaint Reviewed and Verified  Nursing Notes Reviewed and   Verified  Tobacco Reviewed  Allergies Reviewed  Medications Reviewed    OB Status Reviewed         Tobacco:  She has never smoked tobacco.    Current Outpatient Medications   Medication Sig Dispense Refill    triamcinolone 0.1 % External Cream Apply topically 2 (two) times daily as needed. 60 g 3    losartan 50 MG Oral Tab Take 1 tablet (50 mg total) by mouth daily. 90 tablet 3    venlafaxine ER 37.5 MG Oral Capsule SR 24 Hr Take 1 capsule (37.5 mg total) by mouth daily. 90 capsule 3    Apremilast (OTEZLA) 30 MG Oral Tab Take 1 tablet by mouth 2 (two) times daily. 180 tablet 1    levocetirizine 5 MG Oral Tab Take 1 tablet (5 mg total) by mouth every evening. 90 tablet 2    fluticasone propionate (FLONASE) 50 MCG/ACT Nasal Suspension 2 sprays by Nasal route daily. 3 each 2    tamoxifen 20 MG Oral Tab Take 1 tablet (20 mg total) by mouth daily. 90 tablet 3    MONTELUKAST 10 MG Oral Tab TAKE 1 TABLET NIGHTLY 90 tablet 3    halobetasol 0.05 % External Cream Apply bid to affected area for 10 to 14 days 100 g 0    Diclofenac Sodium 1 % External Gel Apply 2 g topically 4 (four) times daily. Apply thin  layer to affected joint. 100 g 3    Azelastine HCl 0.1 % Nasal Solution 2 sprays by Nasal route 2 (two) times daily. 90 mL 0    Cholecalciferol (VITAMIN D3) 25 MCG (1000 UT) Oral Cap Take 1 tablet by mouth daily.      Clobetasol Propionate 0.05 % External Foam Use bid 100 g 6    Fluocinolone Acetonide (DERMOTIC) 0.01 % Otic Oil Use qhs to ears for psoriasis 20 mL 6    Emollient (ELETONE) Apply Externally Cream Use bid 200 g 6    calcipotriene 0.005 % External Cream Apply topically. apply by TOPICAL route 2 times every day a thin film to the affected skin areasand rub in gently and completely       Review of Systems:  Review of Systems   Constitutional: Negative.    Respiratory: Negative.     Cardiovascular: Negative.    Gastrointestinal: Negative.    Skin:         Poorly healed wound   Neurological: Negative.      Objective:   /72   Pulse 83   Ht 5' 3\" (1.6 m)   Wt 153 lb (69.4 kg)   LMP 05/08/2024 (Approximate)   SpO2 95%   BMI 27.10 kg/m²  Estimated body mass index is 27.1 kg/m² as calculated from the following:    Height as of this encounter: 5' 3\" (1.6 m).    Weight as of this encounter: 153 lb (69.4 kg).  Physical Exam  Vitals reviewed.   Constitutional:       General: She is not in acute distress.     Appearance: Normal appearance. She is well-developed.   Cardiovascular:      Rate and Rhythm: Normal rate.   Pulmonary:      Effort: Pulmonary effort is normal.   Skin:     General: Skin is warm and dry.      Comments: Slightly hypertrophic scar inner right thigh   Neurological:      Mental Status: She is alert and oriented to person, place, and time.       Assessment & Plan:   1. Scar (Primary)  -     Triamcinolone Acetonide; Apply topically 2 (two) times daily as needed.  Dispense: 60 g; Refill: 3  2. Delayed wound healing  -     Hemoglobin A1C; Future; Expected date: 05/15/2024  3. Prediabetes  -     Hemoglobin A1C; Future; Expected date: 05/15/2024    FERMÍN Villanueva, 5/15/2024, 3:04 PM

## 2024-06-01 ENCOUNTER — OFFICE VISIT (OUTPATIENT)
Dept: DERMATOLOGY CLINIC | Facility: CLINIC | Age: 49
End: 2024-06-01
Payer: COMMERCIAL

## 2024-06-01 DIAGNOSIS — Z17.0 MALIGNANT NEOPLASM OF OVERLAPPING SITES OF LEFT BREAST IN FEMALE, ESTROGEN RECEPTOR POSITIVE (HCC): ICD-10-CM

## 2024-06-01 DIAGNOSIS — D23.9 BENIGN NEOPLASM OF SKIN, UNSPECIFIED LOCATION: ICD-10-CM

## 2024-06-01 DIAGNOSIS — L40.9 PSORIASIS: Primary | ICD-10-CM

## 2024-06-01 DIAGNOSIS — L43.8 ORAL LICHEN PLANUS: ICD-10-CM

## 2024-06-01 DIAGNOSIS — Z51.81 MEDICATION MONITORING ENCOUNTER: ICD-10-CM

## 2024-06-01 DIAGNOSIS — L72.0 MILIA: ICD-10-CM

## 2024-06-01 DIAGNOSIS — C50.812 MALIGNANT NEOPLASM OF OVERLAPPING SITES OF LEFT BREAST IN FEMALE, ESTROGEN RECEPTOR POSITIVE (HCC): ICD-10-CM

## 2024-06-01 PROCEDURE — 99215 OFFICE O/P EST HI 40 MIN: CPT | Performed by: DERMATOLOGY

## 2024-06-01 RX ORDER — APREMILAST 30 MG/1
1 TABLET, FILM COATED ORAL 2 TIMES DAILY
Qty: 180 TABLET | Refills: 1 | Status: SHIPPED | OUTPATIENT
Start: 2024-06-01

## 2024-06-01 RX ORDER — DOXYCYCLINE HYCLATE 20 MG
20 TABLET ORAL 2 TIMES DAILY
Qty: 60 TABLET | Refills: 5 | Status: SHIPPED | OUTPATIENT
Start: 2024-06-01

## 2024-06-02 NOTE — PROGRESS NOTES
Alma Hackett is a 48 year old female.  HPI:     CC:    Chief Complaint   Patient presents with    Lesion     LOV 12/23. P present with a spot of concern on her right thigh. Pt states 2-3 months ago they checked her BP on that thigh and after she noticed this spot. Pt states it is a bit raised, the spot is dark. Pt denies any hx of skin CA.     Psoriasis     Pt f/u on psoriasis, pt using Otezla; keeps psoriasis stable.          Allergies:  Patient has no known allergies.    HISTORY:    Past Medical History:    Allergic rhinitis    Anxiety    Breast cancer (HCC)    age 44    Conjunctival cyst    At 8 o'clock, OD    Corneal ulcer- right eye    Environmental allergies    Essential hypertension    Failure to thrive in pediatric patient    Headache    Hx of tonsillectomy    Hypertension    Internal hemorrhoids without complication    Lymphedema    Psoriasis      Past Surgical History:   Procedure Laterality Date    Colonoscopy N/A 09/11/2018    Procedure: COLONOSCOPY;  Surgeon: Ekta Villatoro MD;  Location: [facility]    Colonoscopy N/A 03/14/2024    Procedure: COLONOSCOPY;  Surgeon: Francesca Steven MD;  Location: [facility]    Excision turbinate,submucous  06/23/2016    Lumpectomy left Left 09/14/2020    John biopsy stereo nodule 1 site left (cpt=19081) Left 12/02/2022    CLIP PLACED X    Mouth surgery proc unlisted  12/05/2011    Jaw surgery/metal implant    Needle biopsy left Left     8/14/2020    Other surgical history  Osteomyelitis of the jaw    Radiation left Left     Removal of tonsils,10+ y/o  06/23/2016    Repair of nasal septum  06/23/2016    Tonsillectomy  2016      Family History   Problem Relation Age of Onset    Colon Cancer Father 65        Cause of death    Cancer Father     Stroke Mother         Cause of death    Hypertension Mother     Diabetes Maternal Grandmother     Hypertension Sister     Hypertension Brother     Breast Cancer Self 44    Glaucoma Neg     Macular degeneration Neg      Ovarian Cancer Neg       Social History     Socioeconomic History    Marital status:    Tobacco Use    Smoking status: Never     Passive exposure: Never    Smokeless tobacco: Never    Tobacco comments:     Grew up with a smoker, no present household smokers.    Substance and Sexual Activity    Alcohol use: No     Comment: rarely    Drug use: Yes     Comment: none    Sexual activity: Yes     Partners: Male     Birth control/protection: Injection   Other Topics Concern    Caffeine Concern Yes     Comment: 16 oz soda daily    Grew up on a farm No    History of tanning No    Outdoor occupation No    Pt has a pacemaker No    Pt has a defibrillator No    Breast feeding No    Reaction to local anesthetic No        Current Outpatient Medications   Medication Sig Dispense Refill    Apremilast (OTEZLA) 30 MG Oral Tab Take 1 tablet by mouth 2 (two) times daily. 180 tablet 1    Doxycycline Hyclate 20 MG Oral Tab Take 1 tablet (20 mg total) by mouth 2 (two) times daily. 60 tablet 5    triamcinolone 0.1 % External Cream Apply topically 2 (two) times daily as needed. 60 g 3    losartan 50 MG Oral Tab Take 1 tablet (50 mg total) by mouth daily. 90 tablet 3    venlafaxine ER 37.5 MG Oral Capsule SR 24 Hr Take 1 capsule (37.5 mg total) by mouth daily. 90 capsule 3    levocetirizine 5 MG Oral Tab Take 1 tablet (5 mg total) by mouth every evening. 90 tablet 2    fluticasone propionate (FLONASE) 50 MCG/ACT Nasal Suspension 2 sprays by Nasal route daily. 3 each 2    tamoxifen 20 MG Oral Tab Take 1 tablet (20 mg total) by mouth daily. 90 tablet 3    MONTELUKAST 10 MG Oral Tab TAKE 1 TABLET NIGHTLY 90 tablet 3    halobetasol 0.05 % External Cream Apply bid to affected area for 10 to 14 days 100 g 0    Diclofenac Sodium 1 % External Gel Apply 2 g topically 4 (four) times daily. Apply thin layer to affected joint. 100 g 3    Azelastine HCl 0.1 % Nasal Solution 2 sprays by Nasal route 2 (two) times daily. 90 mL 0    Cholecalciferol  (VITAMIN D3) 25 MCG (1000 UT) Oral Cap Take 1 tablet by mouth daily.      Clobetasol Propionate 0.05 % External Foam Use bid 100 g 6    Fluocinolone Acetonide (DERMOTIC) 0.01 % Otic Oil Use qhs to ears for psoriasis 20 mL 6    Emollient (ELETONE) Apply Externally Cream Use bid 200 g 6    calcipotriene 0.005 % External Cream Apply topically. apply by TOPICAL route 2 times every day a thin film to the affected skin areasand rub in gently and completely       Allergies:   No Known Allergies    Past Medical History:    Allergic rhinitis    Anxiety    Breast cancer (HCC)    age 44    Conjunctival cyst    At 8 o'clock, OD    Corneal ulcer- right eye    Environmental allergies    Essential hypertension    Failure to thrive in pediatric patient    Headache    Hx of tonsillectomy    Hypertension    Internal hemorrhoids without complication    Lymphedema    Psoriasis     Past Surgical History:   Procedure Laterality Date    Colonoscopy N/A 09/11/2018    Procedure: COLONOSCOPY;  Surgeon: Ekta Villatoro MD;  [facility]    Colonoscopy N/A 03/14/2024    Procedure: COLONOSCOPY;  Surgeon: Francesca Steven MD;  [facility]    Excision turbinate,submucous  06/23/2016    Lumpectomy left Left 09/14/2020    John biopsy stereo nodule 1 site left (cpt=19081) Left 12/02/2022    CLIP PLACED X    Mouth surgery proc unlisted  12/05/2011    Jaw surgery/metal implant    Needle biopsy left Left     8/14/2020    Other surgical history  Osteomyelitis of the jaw    Radiation left Left     Removal of tonsils,10+ y/o  06/23/2016    Repair of nasal septum  06/23/2016    Tonsillectomy  2016     Social History     Socioeconomic History    Marital status:      Spouse name: Not on file    Number of children: Not on file    Years of education: Not on file    Highest education level: Not on file   Occupational History    Not on file   Tobacco Use    Smoking status: Never     Passive exposure: Never    Smokeless tobacco:  Never    Tobacco comments:     Grew up with a smoker, no present household smokers.    Vaping Use    Vaping status: Not on file   Substance and Sexual Activity    Alcohol use: No     Comment: rarely    Drug use: Yes     Comment: none    Sexual activity: Yes     Partners: Male     Birth control/protection: Injection   Other Topics Concern     Service Not Asked    Blood Transfusions Not Asked    Caffeine Concern Yes     Comment: 16 oz soda daily    Occupational Exposure Not Asked    Hobby Hazards Not Asked    Sleep Concern Not Asked    Stress Concern Not Asked    Weight Concern Not Asked    Special Diet Not Asked    Back Care Not Asked    Exercise Not Asked    Bike Helmet Not Asked    Seat Belt Not Asked    Self-Exams Not Asked    Grew up on a farm No    History of tanning No    Outdoor occupation No    Pt has a pacemaker No    Pt has a defibrillator No    Breast feeding No    Reaction to local anesthetic No    Left Handed Not Asked    Right Handed Not Asked    Currently spends a great deal of time in the sun Not Asked    Past Sunlamp Treatments for Acne Not Asked    Hx of Spending Great Deal of Time in Sun Not Asked    Bad sunburns in the past Not Asked    Tanning Salons in the Past Not Asked    Hx of Radiation Treatments Not Asked    Regular use of sun block Not Asked   Social History Narrative    Not on file     Social Determinants of Health     Financial Resource Strain: Not on file   Food Insecurity: Not on file   Transportation Needs: Not on file   Physical Activity: Not on file   Stress: Not on file   Social Connections: Not on file   Housing Stability: Not on file     Family History   Problem Relation Age of Onset    Colon Cancer Father 65        Cause of death    Cancer Father     Stroke Mother         Cause of death    Hypertension Mother     Diabetes Maternal Grandmother     Hypertension Sister     Hypertension Brother     Breast Cancer Self 44    Glaucoma Neg     Macular degeneration Neg      Ovarian Cancer Neg        There were no vitals filed for this visit.    HPI:    Chief Complaint   Patient presents with    Lesion     LOV 12/23. P present with a spot of concern on her right thigh. Pt states 2-3 months ago they checked her BP on that thigh and after she noticed this spot. Pt states it is a bit raised, the spot is dark. Pt denies any hx of skin CA.     Psoriasis     Pt f/u on psoriasis, pt using Otezla; keeps psoriasis stable.      Follow-up psoriasis doing well.  Otezla helping.  Notes lesion on thigh after blood pressure cuff had bruising seems to be fading still firm    Pt with history of breast cancer on tamoxifen post surgery radiation    Doing well on Otezla essentially clear.  Generally clear on twice daily dosing has had minimal outbreaks.  Scalp especially doing well.  Nails still a concern.   No significant problems with the Otezla.  Psoriasis occasionally uses topicals.  Overall good control with Otezla no significant side effects  No significant arthritis.  Oral erosive gingivitis /LP triggered by Humira in the past, ok currently.  History of atypical nevus abdomen    Biopsy benign keratosis right canthus lateral previously    Past notes/ records and appropriate/relevant lab results including pathology and past body maps reviewed. Updated and new information noted in current visit.       Patient presents with concerns above.    Patient has been in their usual state of health.  History, medications, allergies reviewed as noted.      ROS:  Denies any other systemic complaints.  No new or changeing lesions other than noted above. No fevers, chills, night sweats, unusual sun sensitivity.  No other skin complaints.        History, medications, allergies reviewed as noted.       Physical Examination:     Well-developed well-nourished patient alert oriented in no acute distress.  Exam performed, including scalp, head, neck, face,nails, hair, external eyes, including conjunctival mucosa, eyelids,  lips external ears, chest, arms, legs, palms nails.     Multiple light to medium brown, well marginated, uniformly pigmented, macules and papules 6 mm and less scattered on exam. pigmented lesions examined with dermoscopy benign-appearing patterns.     Waxy tannish keratotic papules scattered, cherry-red vascular papules scattered.    See map today's date for lesions noted .      Otherwise remarkable for lesions as noted on map.  See details of examination  See Assessment /Plan for additional history and physical exam also:    Assessment / plan:    No orders of the defined types were placed in this encounter.      Meds & Refills for this Visit:  Requested Prescriptions     Signed Prescriptions Disp Refills    Apremilast (OTEZLA) 30 MG Oral Tab 180 tablet 1     Sig: Take 1 tablet by mouth 2 (two) times daily.    Doxycycline Hyclate 20 MG Oral Tab 60 tablet 5     Sig: Take 1 tablet (20 mg total) by mouth 2 (two) times daily.         Encounter Diagnoses   Name Primary?    Psoriasis Yes    Medication monitoring encounter     [place]     Benign neoplasm of skin, unspecified location     Oral lichen planus     Malignant neoplasm of overlapping sites of left breast in female, estrogen receptor positive (HCC)        See details on map.      Remarkable for:    Biopsy visit right lateral canthus seborrheic keratosis    Continue to observe atypical nevus site on abdomen.  Continue observation atypical nevus    Few plaques on elbows minimal hands, minimal scalp.  Occasional flareups uses topicals intermittently Derma-Smoothe works well nails with ridges and pits, nailfolds normal.  Erythema, patchy scalp over the forehead brows and medial cheeks consistent with psoriasis. New concern since decreasing Otezla  Ears, have been ok now  Psoriasis.  Plaque-type.  Previously~10% body surface involvement.  Including scalp.  Elbows knees more persistent scattered lesions on trunk and extremities.  Stable doing well nearly clear on Otezla  topicals as needed will let us know she is a refill overall stable few patches over trunk and extremities.  Scalp been pretty clear.  Uses topicals quite sporadically overall happy with control.  Plan to continue no problems tolerating the Otezla.    Previously  dermasmoothe prn,  Add elidel for facial involvment.    Continue calcipotriene cream clobetasol foam, fluocinolone otic oil as needed has really not needed these with consistent Otezla use    Arthritis doing well.  Nails clear presently.  And will treat with medications and grid.  Overall skincare, liberal use of emollients discussed.  Consider more aggressive therapy if worsening.Patient will let us know how they are doing over the next several weeks.  Await clinical response to above therapy.     Patient with gingivitis, oral lichen planus post Humira previously.   Does not want to consider injectables at this time concern with breast cancer and injectables as well  Overall happy with control on Otezla  Discussed possible Alejandro inhibitors if loss of control of her psoriasis or possibly for worsening arthritis  With recent breast cancer, alternative biologics and immunosuppressants contraindicated.  UVB contraindicated -pt at risk for additional atypical pigmented leisons/ NMSC with fair skin.    Continue carful monitoring  Nevi stable no new atypical lesions re-check at f/u.  Overall benign lesions  Unchanged papule mid back observe.    Psoriasis elbows scalp.  Minimal    Benign-appearing nevus on back observe.  Multiple nevi lentigines unchanged no other new suspicious lesions extensive pigmented lesions prior atypical lesions more consistent follow-up especially with recent diagnosis of breast cancer follow-up every 6 months for skin exam    Labs reviewed normal.  Humira.  Has been okay flares intermittently has  Patient with oral lichen planus/post contemplated having implants.  Concern with side effects.  Will continue Otezla, will add doxycycline 20 mg  twice daily.  Follow-up with oral medicine at [place] as well    See previous body maps  Please refer to map for specific lesions.  See additional diagnoses.  Pros cons of various therapies, risks benefits discussed.Pathophysiology discussed with patient.  Therapeutic options reviewed.  See  Medications in grid.  Instructions reviewed at length.    Benign nevi, seborrheic  keratoses, cherry angiomas:  Reassurance regarding other benign skin lesions.Signs and symptoms of skin cancer, ABCDE's of melanoma discussed with patient. Sunscreen use, sun protection, self exams reviewed.  Followup as noted RTC routine checkup 6 mos - one year or p.r.n.    The patient indicates understanding of these issues and agrees to the plan.  The patient is asked to return as noted in follow-up/ above.    This note was generated using Dragon voice recognition software.  Please contact me regarding any confusion resulting from errors in recognition.  Encounter Times   Encounter Times   Including precharting, reviewing chart, prior notes obtaining history, medical exam, notes on body map, plan, counseling, discussion of therapeutic options, patient education, orders more than half total time spent in face to face time with patient.  My total time spent caring for the patient on the day of the encounter: 45 minutes

## 2024-06-04 ENCOUNTER — OFFICE VISIT (OUTPATIENT)
Dept: HEMATOLOGY/ONCOLOGY | Facility: HOSPITAL | Age: 49
End: 2024-06-04
Attending: INTERNAL MEDICINE
Payer: COMMERCIAL

## 2024-06-04 VITALS
DIASTOLIC BLOOD PRESSURE: 84 MMHG | SYSTOLIC BLOOD PRESSURE: 120 MMHG | BODY MASS INDEX: 28.12 KG/M2 | RESPIRATION RATE: 16 BRPM | WEIGHT: 152.81 LBS | TEMPERATURE: 99 F | HEIGHT: 62 IN | OXYGEN SATURATION: 96 % | HEART RATE: 86 BPM

## 2024-06-04 DIAGNOSIS — C50.812 MALIGNANT NEOPLASM OF OVERLAPPING SITES OF LEFT BREAST IN FEMALE, ESTROGEN RECEPTOR POSITIVE (HCC): Primary | ICD-10-CM

## 2024-06-04 DIAGNOSIS — Z79.810 ENCOUNTER FOR MONITORING TAMOXIFEN THERAPY: ICD-10-CM

## 2024-06-04 DIAGNOSIS — C80.1 ANXIETY ASSOCIATED WITH CANCER DIAGNOSIS (HCC): ICD-10-CM

## 2024-06-04 DIAGNOSIS — F41.1 ANXIETY ASSOCIATED WITH CANCER DIAGNOSIS (HCC): ICD-10-CM

## 2024-06-04 DIAGNOSIS — Z51.81 ENCOUNTER FOR MONITORING TAMOXIFEN THERAPY: ICD-10-CM

## 2024-06-04 DIAGNOSIS — Z08 ENCOUNTER FOR FOLLOW-UP SURVEILLANCE OF BREAST CANCER: ICD-10-CM

## 2024-06-04 DIAGNOSIS — Z85.3 ENCOUNTER FOR FOLLOW-UP SURVEILLANCE OF BREAST CANCER: ICD-10-CM

## 2024-06-04 DIAGNOSIS — Z17.0 MALIGNANT NEOPLASM OF OVERLAPPING SITES OF LEFT BREAST IN FEMALE, ESTROGEN RECEPTOR POSITIVE (HCC): Primary | ICD-10-CM

## 2024-06-04 DIAGNOSIS — Z12.31 SCREENING MAMMOGRAM, ENCOUNTER FOR: ICD-10-CM

## 2024-06-04 DIAGNOSIS — Z12.39 BREAST CANCER SCREENING OTHER THAN MAMMOGRAM: ICD-10-CM

## 2024-06-04 PROCEDURE — 99214 OFFICE O/P EST MOD 30 MIN: CPT | Performed by: INTERNAL MEDICINE

## 2024-06-04 NOTE — PROGRESS NOTES
HPI       Alma Hackett is a 48 year old female who is here today for follow-up diagnosis of   Encounter Diagnoses   Name Primary?    Malignant neoplasm of overlapping sites of left breast in female, estrogen receptor positive (HCC) Yes    Encounter for monitoring tamoxifen therapy     Encounter for follow-up surveillance of breast cancer     Breast cancer screening other than mammogram     Screening mammogram, encounter for       Compliance with SBE.   Still palpated on L breast, non tender small sl raised and not bigger in size.     Compliant with tamoxifen.  No hot flashes. Some nausea after taking tamoxifen, she takes in am after breakfast and has nausea for about 3 hrs.    Appetite good, weight is stable.     Still working full time.     Wearing compression sleeve and compression bra. HEP at home at times. Good ROM and no pain.  Using compression pump once a week.     LMP 6/2/24.      ECOG PS 0         Review of Systems:   Review of Systems   Constitutional:  Positive for fatigue. Negative for appetite change and unexpected weight change.   Respiratory:  Negative for cough and shortness of breath.    Cardiovascular:  Positive for palpitations (thinks from anxiety, in am.  Has improved.). Negative for chest pain and leg swelling.   Gastrointestinal:  Negative for abdominal pain.   Endocrine: Negative for hot flashes.   Genitourinary:  Negative for pelvic pain.    Musculoskeletal:  Positive for arthralgias (hands.) and myalgias (Darrion horses at bedtime). Negative for back pain and neck pain.        No bone pain.    Skin:         Hair thinning.   Neurological:  Positive for headaches (with period.). Negative for dizziness.   Hematological:  Negative for adenopathy. Bruises/bleeds easily.   Psychiatric/Behavioral:  Positive for sleep disturbance (since dx). The patient is nervous/anxious.          Current Outpatient Medications   Medication Sig Dispense Refill    Apremilast (OTEZLA) 30 MG Oral Tab Take  1 tablet by mouth 2 (two) times daily. 180 tablet 1    Doxycycline Hyclate 20 MG Oral Tab Take 1 tablet (20 mg total) by mouth 2 (two) times daily. 60 tablet 5    triamcinolone 0.1 % External Cream Apply topically 2 (two) times daily as needed. 60 g 3    losartan 50 MG Oral Tab Take 1 tablet (50 mg total) by mouth daily. 90 tablet 3    venlafaxine ER 37.5 MG Oral Capsule SR 24 Hr Take 1 capsule (37.5 mg total) by mouth daily. 90 capsule 3    levocetirizine 5 MG Oral Tab Take 1 tablet (5 mg total) by mouth every evening. 90 tablet 2    fluticasone propionate (FLONASE) 50 MCG/ACT Nasal Suspension 2 sprays by Nasal route daily. 3 each 2    tamoxifen 20 MG Oral Tab Take 1 tablet (20 mg total) by mouth daily. 90 tablet 3    MONTELUKAST 10 MG Oral Tab TAKE 1 TABLET NIGHTLY 90 tablet 3    halobetasol 0.05 % External Cream Apply bid to affected area for 10 to 14 days 100 g 0    Diclofenac Sodium 1 % External Gel Apply 2 g topically 4 (four) times daily. Apply thin layer to affected joint. 100 g 3    Azelastine HCl 0.1 % Nasal Solution 2 sprays by Nasal route 2 (two) times daily. 90 mL 0    Cholecalciferol (VITAMIN D3) 25 MCG (1000 UT) Oral Cap Take 1 tablet by mouth daily.      Clobetasol Propionate 0.05 % External Foam Use bid 100 g 6    Fluocinolone Acetonide (DERMOTIC) 0.01 % Otic Oil Use qhs to ears for psoriasis 20 mL 6    Emollient (ELETONE) Apply Externally Cream Use bid 200 g 6    calcipotriene 0.005 % External Cream Apply topically. apply by TOPICAL route 2 times every day a thin film to the affected skin areasand rub in gently and completely       Allergies:   No Known Allergies    Past Medical History:    Allergic rhinitis    Anxiety    Breast cancer (HCC)    age 44    Conjunctival cyst    At 8 o'clock, OD    Corneal ulcer- right eye    Environmental allergies    Essential hypertension    Failure to thrive in pediatric patient    Headache    Hx of tonsillectomy    Hypertension    Internal hemorrhoids without  complication    Lymphedema    Psoriasis     Past Surgical History:   Procedure Laterality Date    Colonoscopy N/A 09/11/2018    Procedure: COLONOSCOPY;  Surgeon: Ekta Villatoro MD;  Location: [facility] ENDO    Colonoscopy N/A 03/14/2024    Procedure: COLONOSCOPY;  Surgeon: Francesca Steven MD;  Location: [facility] ENDO    Excision turbinate,submucous  06/23/2016    Lumpectomy left Left 09/14/2020    John biopsy stereo nodule 1 site left (cpt=19081) Left 12/02/2022    CLIP PLACED X    Mouth surgery proc unlisted  12/05/2011    Jaw surgery/metal implant    Needle biopsy left Left     8/14/2020    Other surgical history  Osteomyelitis of the jaw    Radiation left Left     Removal of tonsils,10+ y/o  06/23/2016    Repair of nasal septum  06/23/2016    Tonsillectomy  2016     Social History     Socioeconomic History    Marital status:    Tobacco Use    Smoking status: Never     Passive exposure: Never    Smokeless tobacco: Never    Tobacco comments:     Grew up with a smoker, no present household smokers.    Substance and Sexual Activity    Alcohol use: No     Comment: rarely    Drug use: Yes     Comment: none    Sexual activity: Yes     Partners: Male     Birth control/protection: Injection   Other Topics Concern    Caffeine Concern Yes     Comment: 16 oz soda daily    Grew up on a farm No    History of tanning No    Outdoor occupation No    Pt has a pacemaker No    Pt has a defibrillator No    Breast feeding No    Reaction to local anesthetic No       Family History   Problem Relation Age of Onset    Colon Cancer Father 65        Cause of death    Cancer Father     Stroke Mother         Cause of death    Hypertension Mother     Diabetes Maternal Grandmother     Hypertension Sister     Hypertension Brother     Breast Cancer Self 44    Glaucoma Neg     Macular degeneration Neg     Ovarian Cancer Neg          PHYSICAL EXAM:    /84 (BP Location: Right arm, Patient Position: Sitting, Cuff Size: adult)   Pulse  86   Temp 98.5 °F (36.9 °C) (Oral)   Resp 16   Ht 1.575 m (5' 2\")   Wt 69.3 kg (152 lb 12.8 oz)   LMP 05/08/2024 (Approximate)   SpO2 96%   BMI 27.95 kg/m²   Wt Readings from Last 6 Encounters:   06/04/24 69.3 kg (152 lb 12.8 oz)   05/15/24 69.4 kg (153 lb)   03/14/24 65.8 kg (145 lb)   12/04/23 69.2 kg (152 lb 8 oz)   11/29/23 68.9 kg (152 lb)   11/03/23 68.9 kg (152 lb)     Physical Exam  General: Patient is alert, not in acute distress.  HEENT: EOMs intact. PERRL.  Neck: No JVD. No palpable lymphadenopathy. Neck is supple.  Chest: Clear to auscultation.  Breasts:  R breast no mass, L breast with surgical scars at breast and axilla and RT skin changes and lymphedema well controlled, no mass. 5 o'clock smaller size of eraser sl raised, dry non tender, benign.   Heart: Regular rate and rhythm.   Abdomen: Soft, non tender with good bowel sounds.    Extremities: No edema.  Neurological: Grossly intact.   Lymphatics: There is no palpable lymphadenopathy throughout in the cervical, supraclavicular, axillary, or inguinal regions.  Psych/Depression: nl        ASSESSMENT/PLAN:     Encounter Diagnoses   Name Primary?    Malignant neoplasm of overlapping sites of left breast in female, estrogen receptor positive (HCC) Yes    Encounter for monitoring tamoxifen therapy     Encounter for follow-up surveillance of breast cancer     Breast cancer screening other than mammogram     Screening mammogram, encounter for         Cancer Staging   Malignant neoplasm of overlapping sites of left breast in female, estrogen receptor positive (HCC)  Staging form: Breast, AJCC 8th Edition  - Pathologic stage from 9/18/2020: Stage IA (pT1b, pN0(sn), cM0, G2, ER+, DE+, HER2-, Oncotype DX score: 20) - Signed by Lonnie Powell MD on 9/29/2020    Alma Hackett is a 48 year old female with ER/DE positive breast cancer, node negative.    Patient completed local control of treatment with lumpectomy and radiation therapy.    Oncotype  score of 20, which per data from TAILORX trial the risk of distant recurrence at 9 years with 5 years of adjuvant hormonal therapy is 6% in the group average absolute benefit from chemotherapy is less than 1%.  Discussed with the patient that when stratified by age patient's less than or equal to 50 years old the benefit from chemotherapy and is approximately 1.6%.  Discussed with the patient the potential side effects of chemotherapy, and also discussed with the patient that there is option for 10 years of adjuvant hormonal therapy to improve outcomes.     Given the patient's low risk or recurrence, and marginal benefit from chemotherapy, do not recommend adjuvant chemotherapy.  She is on adjuvant hormonal therapy alone.    Patient is premenopausal.      Taking tamoxifen daily and is tolerating well.  She will complete treatment with tamoxifen on November of 2025. Discussed that she is supposed to take the AI at the same time every day and that the therapy will be for 5-10 yrs total.     L mammogram on 11/17/2023 BI-RADS 2, she will be having bilateral mammogram November 2024.      MRI of the breasts on 4/12/2024 BIRADS-2, next due in April of 2025.    Discussed with the patient that when she becomes postmenopausal, once this is confirmed, will then switch to an aromatase inhibitor to complete her adjuvant therapy course.      All questions answered to the best of my abilities.  Support provided to the patient.      MDM - mod  No follow-ups on file.  No orders of the defined types were placed in this encounter.    Results From Past 48 Hours:  No results found for this or any previous visit (from the past 48 hour(s)).      Imaging & Referrals:  None   Narrative   PROCEDURE: MRI BREAST (W+WO) W/CAD BILAT (CPT=77049)      COMPARISON: [facility], HARMONY BONNIE 2D+3D DIAGNOSTIC [facility] BILAT (OOA=50208/24260), 11/17/2023, 1:50 PM.  [facility], MRI BREAST (W+WO) W/CAD BILAT (CPT=77049),  8/25/2020, 5:58 PM.      INDICATIONS: Z12.39 Breast cancer screening other than mammogram Z17.0 Malignant neoplasm of overlapping sites of left breast in female, estrogen receptor positive (HCC) C5* 48-year-old female with history of left breast cancer diagnosed at age 44 status    post treatment for high risk screening breast MRI.  Patient had bilateral mammogram performed November 2023 which was reported as benign      TECHNIQUE: Breast MRI was performed using dynamic intravenous gadolinium infusion, thin sections, and a dedicated breast coil. T1, T2, STIR, and pre and post contrast T1 fat sat sequences were obtained. 13 cc of intravenous gadolinium was used.      LMP:  4/5/2024      Of note:   All measurements are listed in AP x trans x CC      AMOUNT OF FIBROGLANDULAR TISSUE:  There is scattered fibroglandular tissue      BACKGROUND PARENCHYMAL ENHANCEMENT PATTERN:  There is minimal background parenchymal enhancement     FINDINGS:   Right breast:  A stable enhancing focus is seen in the medial right breast unchanged when compared to prior exam of August 2020.  Given long-term stability for a time period of greater than 2 years, this is presumably benign.  No abnormal enhancement is   seen.  No abnormal right axillary or right internal mammary lymph nodes are seen.      Left breast:  New postsurgical changes are seen in the left breast.   No abnormal enhancement is seen.  No abnormal left axillary or left internal mammary lymph nodes are seen.      CONCLUSION:  Benign findings.  Given patient's age at diagnosis of breast cancer (less than 50), annual screening MRI in conjunction with annual mammography at alternating 6 month intervals is recommended.      BI-RADS CATEGORY:   DIAGNOSTIC CATEGORY 2--BENIGN FINDING:       RECOMMENDATIONS:   SCREENING MRI OF THE BREAST IN ONE YEAR       PLEASE NOTE: A NORMAL MRI DOES NOT EXCLUDE THE POSSIBILITY OF BREAST CANCER.  A CLINICALLY SUSPICIOUS PALPABLE LUMP SHOULD BE  BIOPSIED.                      Finalized by (CST): Madison Epperson MD on 4/12/2024 at 2:09 PM

## 2024-07-10 ENCOUNTER — OFFICE VISIT (OUTPATIENT)
Dept: INTERNAL MEDICINE CLINIC | Facility: CLINIC | Age: 49
End: 2024-07-10

## 2024-07-10 VITALS
SYSTOLIC BLOOD PRESSURE: 119 MMHG | HEIGHT: 63 IN | HEART RATE: 93 BPM | BODY MASS INDEX: 27.5 KG/M2 | WEIGHT: 155.19 LBS | DIASTOLIC BLOOD PRESSURE: 79 MMHG

## 2024-07-10 DIAGNOSIS — F41.9 ANXIETY: ICD-10-CM

## 2024-07-10 DIAGNOSIS — L40.9 PSORIASIS AND SIMILAR DISORDER: ICD-10-CM

## 2024-07-10 DIAGNOSIS — Z91.09 ENVIRONMENTAL ALLERGIES: ICD-10-CM

## 2024-07-10 DIAGNOSIS — I83.90 VARICOSE VEINS: ICD-10-CM

## 2024-07-10 DIAGNOSIS — L65.9 HAIR LOSS: Primary | ICD-10-CM

## 2024-07-10 DIAGNOSIS — Z85.3 HISTORY OF LEFT BREAST CANCER: ICD-10-CM

## 2024-07-10 PROCEDURE — 3074F SYST BP LT 130 MM HG: CPT | Performed by: INTERNAL MEDICINE

## 2024-07-10 PROCEDURE — 3008F BODY MASS INDEX DOCD: CPT | Performed by: INTERNAL MEDICINE

## 2024-07-10 PROCEDURE — 99214 OFFICE O/P EST MOD 30 MIN: CPT | Performed by: INTERNAL MEDICINE

## 2024-07-10 PROCEDURE — 3078F DIAST BP <80 MM HG: CPT | Performed by: INTERNAL MEDICINE

## 2024-07-11 ENCOUNTER — TELEPHONE (OUTPATIENT)
Dept: INTERNAL MEDICINE CLINIC | Facility: CLINIC | Age: 49
End: 2024-07-11

## 2024-07-11 NOTE — TELEPHONE ENCOUNTER
Family Medical Leave Act forms received. Valid authorization on file signed on 1/8/24. Logged for processing.

## 2024-07-11 NOTE — PROGRESS NOTES
HPI:    Patient ID: Alma Hackett is a 48 year old female.    Varicose Veins  Associated symptoms include a rash. Pertinent negatives include no abdominal pain, chest pain, chills, coughing, fatigue, fever, headaches, joint swelling, nausea, sore throat, vomiting or weakness.       HTN  Long standing history of hypertension     sympotms  :        Headache no  dizziness        no                             Blurred vision no  palpitaionsSyncope no  Chest pain  no  PND  Orthopnea no  Weakness  No  Low salt diet    yes    Compliance with exercise stays active  Compliance with medication yes                                            Hx of breat ca on  FMLA  Hair loss     superficial varicose veins  Psoriasis  HTN    /79 (BP Location: Right arm, Patient Position: Sitting, Cuff Size: large)   Pulse 93   Ht 5' 3\" (1.6 m)   Wt 155 lb 3.2 oz (70.4 kg)   LMP 05/08/2024 (Approximate)   BMI 27.49 kg/m²   Wt Readings from Last 6 Encounters:   07/10/24 155 lb 3.2 oz (70.4 kg)   06/04/24 152 lb 12.8 oz (69.3 kg)   05/15/24 153 lb (69.4 kg)   03/14/24 145 lb (65.8 kg)   12/04/23 152 lb 8 oz (69.2 kg)   11/29/23 152 lb (68.9 kg)     Body mass index is 27.49 kg/m².  HGBA1C:    Lab Results   Component Value Date    A1C 5.7 (H) 05/15/2024    A1C 6.1 (H) 07/28/2023    A1C 5.8 (H) 04/01/2021     05/15/2024         Review of Systems   Constitutional:  Negative for activity change, chills, fatigue and fever.   HENT:  Negative for ear discharge, nosebleeds, postnasal drip, rhinorrhea, sinus pressure and sore throat.    Eyes:  Negative for pain, discharge and redness.   Respiratory:  Negative for cough, chest tightness, shortness of breath and wheezing.    Cardiovascular:  Negative for chest pain, palpitations and leg swelling.   Gastrointestinal:  Negative for abdominal pain, blood in stool, constipation, diarrhea, nausea and vomiting.   Genitourinary:  Negative for difficulty urinating, dysuria, frequency,  hematuria and urgency.   Musculoskeletal:  Negative for back pain, gait problem and joint swelling.   Skin:  Positive for rash.   Neurological:  Negative for syncope, weakness, light-headedness and headaches.   Psychiatric/Behavioral:  Negative for dysphoric mood. The patient is not nervous/anxious.          Current Outpatient Medications   Medication Sig Dispense Refill    Apremilast (OTEZLA) 30 MG Oral Tab Take 1 tablet by mouth 2 (two) times daily. 180 tablet 1    Doxycycline Hyclate 20 MG Oral Tab Take 1 tablet (20 mg total) by mouth 2 (two) times daily. 60 tablet 5    triamcinolone 0.1 % External Cream Apply topically 2 (two) times daily as needed. 60 g 3    losartan 50 MG Oral Tab Take 1 tablet (50 mg total) by mouth daily. 90 tablet 3    venlafaxine ER 37.5 MG Oral Capsule SR 24 Hr Take 1 capsule (37.5 mg total) by mouth daily. 90 capsule 3    levocetirizine 5 MG Oral Tab Take 1 tablet (5 mg total) by mouth every evening. 90 tablet 2    fluticasone propionate (FLONASE) 50 MCG/ACT Nasal Suspension 2 sprays by Nasal route daily. 3 each 2    tamoxifen 20 MG Oral Tab Take 1 tablet (20 mg total) by mouth daily. 90 tablet 3    MONTELUKAST 10 MG Oral Tab TAKE 1 TABLET NIGHTLY 90 tablet 3    halobetasol 0.05 % External Cream Apply bid to affected area for 10 to 14 days 100 g 0    Diclofenac Sodium 1 % External Gel Apply 2 g topically 4 (four) times daily. Apply thin layer to affected joint. 100 g 3    Azelastine HCl 0.1 % Nasal Solution 2 sprays by Nasal route 2 (two) times daily. 90 mL 0    Cholecalciferol (VITAMIN D3) 25 MCG (1000 UT) Oral Cap Take 1 tablet by mouth daily.      Clobetasol Propionate 0.05 % External Foam Use bid 100 g 6    Fluocinolone Acetonide (DERMOTIC) 0.01 % Otic Oil Use qhs to ears for psoriasis 20 mL 6    Emollient (ELETONE) Apply Externally Cream Use bid 200 g 6    calcipotriene 0.005 % External Cream Apply topically. apply by TOPICAL route 2 times every day a thin film to the affected skin  areasand rub in gently and completely       Allergies:No Known Allergies    HISTORY:  Past Medical History:    Allergic rhinitis    Anxiety    Breast cancer (HCC)    age 44    Conjunctival cyst    At 8 o'clock, OD    Corneal ulcer- right eye    Environmental allergies    Essential hypertension    Failure to thrive in pediatric patient    Headache    Hx of tonsillectomy    Hypertension    Internal hemorrhoids without complication    Lymphedema    Psoriasis      Past Surgical History:   Procedure Laterality Date    Colonoscopy N/A 09/11/2018    Procedure: COLONOSCOPY;  Surgeon: Ekta Villatoro MD;  Location: [facility] ENDO    Colonoscopy N/A 03/14/2024    Procedure: COLONOSCOPY;  Surgeon: Francesca Steven MD;  Location: [facility] ENDO    Excision turbinate,submucous  06/23/2016    Lumpectomy left Left 09/14/2020    John biopsy stereo nodule 1 site left (cpt=19081) Left 12/02/2022    CLIP PLACED X    Mouth surgery proc unlisted  12/05/2011    Jaw surgery/metal implant    Needle biopsy left Left     8/14/2020    Other surgical history  Osteomyelitis of the jaw    Radiation left Left     Removal of tonsils,10+ y/o  06/23/2016    Repair of nasal septum  06/23/2016    Tonsillectomy  2016      Family History   Problem Relation Age of Onset    Colon Cancer Father 65        Cause of death    Cancer Father     Stroke Mother         Cause of death    Hypertension Mother     Diabetes Maternal Grandmother     Hypertension Sister     Hypertension Brother     Breast Cancer Self 44    Glaucoma Neg     Macular degeneration Neg     Ovarian Cancer Neg       Social History:   Social History     Socioeconomic History    Marital status:    Tobacco Use    Smoking status: Never     Passive exposure: Never    Smokeless tobacco: Never    Tobacco comments:     Grew up with a smoker, no present household smokers.    Substance and Sexual Activity    Alcohol use: No     Comment: rarely    Drug use: Yes     Comment: none    Sexual activity:  Yes     Partners: Male     Birth control/protection: Injection   Other Topics Concern    Caffeine Concern Yes     Comment: 16 oz soda daily    Grew up on a farm No    History of tanning No    Outdoor occupation No    Pt has a pacemaker No    Pt has a defibrillator No    Breast feeding No    Reaction to local anesthetic No        PHYSICAL EXAM:    Physical Exam  Constitutional:       Appearance: She is well-developed. She is not ill-appearing.   HENT:      Right Ear: Ear canal normal.      Left Ear: Ear canal normal.      Mouth/Throat:      Pharynx: Oropharynx is clear.   Eyes:      Extraocular Movements: Extraocular movements intact.      Conjunctiva/sclera: Conjunctivae normal.      Pupils: Pupils are equal, round, and reactive to light.   Cardiovascular:      Rate and Rhythm: Normal rate and regular rhythm.      Heart sounds: Normal heart sounds.   Pulmonary:      Effort: Pulmonary effort is normal.      Breath sounds: Normal breath sounds.   Abdominal:      General: Bowel sounds are normal.      Palpations: Abdomen is soft.   Skin:     General: Skin is warm and dry.   Neurological:      Mental Status: She is alert.   Psychiatric:         Mood and Affect: Mood normal.              ASSESSMENT/PLAN:   (L65.9) Hair loss  (primary encounter diagnosis)  Plan: avoid hair conditioner toughing her scalp  Try organic hair products   no sarah beth alopecia  Follow up with derm    (Z85.3) History of left breast cancer  Plan: ongoing monitoring and follow up with oncology  No knonw recurrence    (L40.9) Psoriasis and similar disorder  Plan: ongong treatment by derm    (F41.9) Anxiety  Plan: dong wll in general    (Z91.09) Environmental allergies  Plan: con meds    (I83.90) Varicose veins  Plan: superficial leg elevation support hopse    Pt will cont FMLA with ongoing medical problems and treatment    Patient voiced understanding  and agrees with plan         No orders of the defined types were placed in this encounter.      Meds  This Visit:  Requested Prescriptions      No prescriptions requested or ordered in this encounter       Imaging & Referrals:  None        ID#[id number]

## 2024-07-18 RX ORDER — DOXYCYCLINE HYCLATE 20 MG
20 TABLET ORAL 2 TIMES DAILY
Qty: 60 TABLET | Refills: 4 | Status: SHIPPED | OUTPATIENT
Start: 2024-07-18

## 2024-07-18 NOTE — TELEPHONE ENCOUNTER
Refill Request for medication(s):     Last Office Visit: 6/1/2024    Last Refill:     Pharmacy, Dosage verified:     Condition Update (if applicable):     Rx pended and sent to provider for approval, please advise. Thank You!

## 2024-07-22 RX ORDER — MONTELUKAST SODIUM 10 MG/1
10 TABLET ORAL NIGHTLY
Qty: 90 TABLET | Refills: 0 | Status: SHIPPED | OUTPATIENT
Start: 2024-07-22

## 2024-07-22 NOTE — TELEPHONE ENCOUNTER
Refill requested for   Requested Prescriptions   Pending Prescriptions Disp Refills    montelukast 10 MG Oral Tab 90 tablet 3     Sig: Take 1 tablet (10 mg total) by mouth nightly.       Corticosteroids / Long-Acting Bronchodilators Passed - 7/22/2024  9:03 AM        Passed - Appt in past 6 mos or next 3 mos     Recent Outpatient Visits              1 week ago Hair loss    [facility], [place] Jones MD    Office Visit    1 month ago Malignant neoplasm of overlapping sites of left breast in female, estrogen receptor positive (HCC)    [facility] Lonnie Powell MD    Office Visit    1 month ago Psoriasis    [facility], [place] Rachael Aden MD    Office Visit    2 months ago Scar    [facility] [place] Mary Mehta APRN    Office Visit    7 months ago     [facility]    Nurse Only          Future Appointments         Provider Department Appt Notes    In 1 month Mitchell Khan MD [facility] See if my medication needs to be changed    In 3 months Christiana Aguila MD [facility] - OB/GYN Annual physical    In 4 months Lonnie Powell MD [facility] FOLLOW UP [email or web address]                          Last office visit: 12/22/22    Previously advised to follow up in 1 year or sooner if needed     F/U currently scheduled? 8/28/24     Date of last refill: 3/20/23    ACTION: Refill filled per protocol - no further refills until patient is seen

## 2024-07-22 NOTE — TELEPHONE ENCOUNTER
Please try to avoid signing forms in the corner as it is not visible when printing and forms are not accepted this way. Thank you!    Dr. Franco,    **The ACKNOWLEDGE button has been moved to the top right ribbon**    Please sign off on form if you agree to: Family Medical Leave Act recertification  Start 8/1/24, end 2/1/24  (place your signature on the first page only)  -From your Inbasket, Highlight the patient and click Chart  -Double click the 7/11/24 Forms Completion telephone encounter  -Scroll down to the Media section  -Click the blue Hyperlink: Roberto Carlos Franco 7/22/24    -Click Acknowledge located in the top right ribbon/menu  -Drag the mouse into the blank space of the document and a + sign will appear. Left click to  electronically sign the document.    Thank you,    Jocelyn

## 2024-07-23 NOTE — TELEPHONE ENCOUNTER
Form signed and efaxed to [facility] per January 2024 Release of Information [phone]. Sent mychart msg

## 2024-08-28 ENCOUNTER — OFFICE VISIT (OUTPATIENT)
Dept: ALLERGY | Facility: CLINIC | Age: 49
End: 2024-08-28
Payer: COMMERCIAL

## 2024-08-28 VITALS — SYSTOLIC BLOOD PRESSURE: 124 MMHG | HEART RATE: 71 BPM | DIASTOLIC BLOOD PRESSURE: 84 MMHG | OXYGEN SATURATION: 99 %

## 2024-08-28 DIAGNOSIS — H10.10 SEASONAL AND PERENNIAL ALLERGIC RHINOCONJUNCTIVITIS: ICD-10-CM

## 2024-08-28 DIAGNOSIS — J30.89 SEASONAL AND PERENNIAL ALLERGIC RHINOCONJUNCTIVITIS: ICD-10-CM

## 2024-08-28 DIAGNOSIS — J30.2 SEASONAL AND PERENNIAL ALLERGIC RHINOCONJUNCTIVITIS: ICD-10-CM

## 2024-08-28 DIAGNOSIS — Z23 FLU VACCINE NEED: ICD-10-CM

## 2024-08-28 DIAGNOSIS — J32.9 CHRONIC SINUSITIS, UNSPECIFIED LOCATION: Primary | ICD-10-CM

## 2024-08-28 DIAGNOSIS — Z23 NEED FOR COVID-19 VACCINE: ICD-10-CM

## 2024-08-28 DIAGNOSIS — J32.9 RHINOSINUSITIS: ICD-10-CM

## 2024-08-28 PROCEDURE — 3079F DIAST BP 80-89 MM HG: CPT | Performed by: ALLERGY & IMMUNOLOGY

## 2024-08-28 PROCEDURE — 3074F SYST BP LT 130 MM HG: CPT | Performed by: ALLERGY & IMMUNOLOGY

## 2024-08-28 PROCEDURE — 99214 OFFICE O/P EST MOD 30 MIN: CPT | Performed by: ALLERGY & IMMUNOLOGY

## 2024-08-28 RX ORDER — DOXYCYCLINE 100 MG/1
100 CAPSULE ORAL 2 TIMES DAILY
Qty: 20 CAPSULE | Refills: 0 | Status: SHIPPED | OUTPATIENT
Start: 2024-08-28 | End: 2024-09-07

## 2024-08-28 RX ORDER — AZELASTINE 1 MG/ML
2 SPRAY, METERED NASAL 2 TIMES DAILY
Qty: 3 EACH | Refills: 2 | Status: SHIPPED | OUTPATIENT
Start: 2024-08-28

## 2024-08-28 RX ORDER — FLUTICASONE PROPIONATE 50 MCG
2 SPRAY, SUSPENSION (ML) NASAL DAILY
Qty: 3 EACH | Refills: 2 | Status: SHIPPED | OUTPATIENT
Start: 2024-08-28

## 2024-08-28 RX ORDER — MONTELUKAST SODIUM 10 MG/1
10 TABLET ORAL NIGHTLY
Qty: 90 TABLET | Refills: 2 | Status: SHIPPED | OUTPATIENT
Start: 2024-08-28

## 2024-08-28 RX ORDER — LEVOCETIRIZINE DIHYDROCHLORIDE 5 MG/1
5 TABLET, FILM COATED ORAL EVERY EVENING
Qty: 90 TABLET | Refills: 2 | Status: SHIPPED | OUTPATIENT
Start: 2024-08-28

## 2024-08-28 RX ORDER — DOXYCYCLINE 100 MG/1
100 CAPSULE ORAL 2 TIMES DAILY
Qty: 20 CAPSULE | Refills: 0 | Status: SHIPPED | OUTPATIENT
Start: 2024-08-28 | End: 2024-08-28

## 2024-08-28 RX ORDER — TRIAMCINOLONE ACETONIDE 1 MG/G
OINTMENT TOPICAL
Qty: 60 G | Refills: 0 | Status: SHIPPED | OUTPATIENT
Start: 2024-08-28

## 2024-08-28 NOTE — PROGRESS NOTES
Alma Hackett is a 48 year old female.    HPI:     Chief Complaint   Patient presents with    Sinus Problem     Annual visit. No major concerns or symptoms. Currently on xyzal, singulair, and using flonase and Astelin nasal spray.      Patient is a 48-year-old female who presents for follow-up with a chief complaint of allergies and sinus issues  Patient last seen by me in December 2022.  Prior skin testing in 2021 was positive to dog  3 dogs in the home setting      Prior CT of sinuses in 2021  Medication list includes Singulair Xyzal Flonase triamcinolone and Astelin    COVID vaccines x 3 doses last in 2021  Last flu vaccine in October 2022.    Today patient reports.    Ar:  Active or persistent symptoms: pressure in teeth  left upper molar/max sinus   Denies runny nose, sz nc   No fever or mp   Active meds: Xyzal  prn Singulair  daily,  Flonase prn ,  Astelin prn   Pets dogs     Chronic sinusitis  pressure in teeth   1-2x in past year . No abx   Denies recurrent sinus infections or antibiotics in the interim  No current fevers or mucopurulence.    Mite bites 'legs       HISTORY:  Past Medical History:    Allergic rhinitis    Anxiety    Breast cancer (HCC)    age 44    Conjunctival cyst    At 8 o'clock, OD    Corneal ulcer- right eye    Environmental allergies    Essential hypertension    Failure to thrive in pediatric patient    Headache    Hx of tonsillectomy    Hypertension    Internal hemorrhoids without complication    Lymphedema    Psoriasis      Past Surgical History:   Procedure Laterality Date    Colonoscopy N/A 09/11/2018    Procedure: COLONOSCOPY;  Surgeon: Ekta Villatoro MD;  Location: [facility] ENDO    Colonoscopy N/A 03/14/2024    Procedure: COLONOSCOPY;  Surgeon: Francesca Steven MD;  Location: [facility] ENDO    Excision turbinate,submucous  06/23/2016    Lumpectomy left Left 09/14/2020    John biopsy stereo nodule 1 site left (cpt=19081) Left 12/02/2022    CLIP PLACED X    Mouth surgery  proc unlisted  12/05/2011    Jaw surgery/metal implant    Needle biopsy left Left     8/14/2020    Other surgical history  Osteomyelitis of the jaw    Radiation left Left     Removal of tonsils,10+ y/o  06/23/2016    Repair of nasal septum  06/23/2016    Tonsillectomy  2016      Family History   Problem Relation Age of Onset    Colon Cancer Father 65        Cause of death    Cancer Father     Stroke Mother         Cause of death    Hypertension Mother     Diabetes Maternal Grandmother     Hypertension Sister     Hypertension Brother     Breast Cancer Self 44    Glaucoma Neg     Macular degeneration Neg     Ovarian Cancer Neg       Social History:   Social History     Socioeconomic History    Marital status:    Tobacco Use    Smoking status: Never     Passive exposure: Never    Smokeless tobacco: Never    Tobacco comments:     Grew up with a smoker, no present household smokers.    Substance and Sexual Activity    Alcohol use: No     Comment: rarely    Drug use: Yes     Comment: none    Sexual activity: Yes     Partners: Male     Birth control/protection: Injection   Other Topics Concern    Caffeine Concern Yes     Comment: 16 oz soda daily    Grew up on a farm No    History of tanning No    Outdoor occupation No    Pt has a pacemaker No    Pt has a defibrillator No    Breast feeding No    Reaction to local anesthetic No        Medications (Active prior to today's visit):  Current Outpatient Medications   Medication Sig Dispense Refill    montelukast 10 MG Oral Tab Take 1 tablet (10 mg total) by mouth nightly. 90 tablet 2    azelastine 0.1 % Nasal Solution 2 sprays by Nasal route 2 (two) times daily. 3 each 2    levocetirizine 5 MG Oral Tab Take 1 tablet (5 mg total) by mouth every evening. 90 tablet 2    fluticasone propionate (FLONASE) 50 MCG/ACT Nasal Suspension 2 sprays by Nasal route daily. 3 each 2    doxycycline 100 MG Oral Cap Take 1 capsule (100 mg total) by mouth 2 (two) times daily for 10 days. 20  capsule 0    triamcinolone 0.1 % External Ointment Applied 2 times per day as needed 60 g 0    Doxycycline Hyclate 20 MG Oral Tab Take 1 tablet (20 mg total) by mouth 2 (two) times daily. 60 tablet 4    Apremilast (OTEZLA) 30 MG Oral Tab Take 1 tablet by mouth 2 (two) times daily. 180 tablet 1    losartan 50 MG Oral Tab Take 1 tablet (50 mg total) by mouth daily. 90 tablet 3    venlafaxine ER 37.5 MG Oral Capsule SR 24 Hr Take 1 capsule (37.5 mg total) by mouth daily. 90 capsule 3    tamoxifen 20 MG Oral Tab Take 1 tablet (20 mg total) by mouth daily. 90 tablet 3    halobetasol 0.05 % External Cream Apply bid to affected area for 10 to 14 days 100 g 0    Diclofenac Sodium 1 % External Gel Apply 2 g topically 4 (four) times daily. Apply thin layer to affected joint. 100 g 3    Cholecalciferol (VITAMIN D3) 25 MCG (1000 UT) Oral Cap Take 1 tablet by mouth daily.      Clobetasol Propionate 0.05 % External Foam Use bid 100 g 6    Fluocinolone Acetonide (DERMOTIC) 0.01 % Otic Oil Use qhs to ears for psoriasis 20 mL 6    Emollient (ELETONE) Apply Externally Cream Use bid 200 g 6    calcipotriene 0.005 % External Cream Apply topically. apply by TOPICAL route 2 times every day a thin film to the affected skin areasand rub in gently and completely         Allergies:  No Known Allergies      ROS:   Allergic/Immuno:  See hpi  Cardiovascular:  Negative for irregular heartbeat/palpitations, chest pain, edema  Constitutional:  Negative night sweats,weight loss, irritability and lethargy  ENMT:  Negative for ear drainage, hearing loss and nasal drainage  Eyes:  Negative for eye discharge and vision loss  Gastrointestinal:  Negative for abdominal pain, diarrhea and vomiting  Integumentary: Red scattered blanchable papules 3 to 4 mm in size.  Suggestive of mite bites  Respiratory:  Negative for cough, dyspnea and wheezing    PHYSICAL EXAM:   Constitutional: responsive, no acute distress noted  Head/Face: NC/Atraumatic  Eyes/Vision:  conjunctiva and lids are normal extraocular motion is intact   Ears/Audiometry: tympanic membranes are normal bilaterally hearing is grossly intact  Nose/Mouth/Throat: nose and throat are clear mucous membranes are moist   Neck/Thyroid: neck is supple without adenopathy  Lymphatic: no abnormal cervical, supraclavicular or axillary adenopathy is noted  Respiratory: normal to inspection lungs are clear to auscultation bilaterally normal respiratory effort   Cardiovascular: regular rate and rhythm no murmurs, gallups, or rubs  Abdomen: soft non-tender non-distended  Skin/Hair: no unusual rashes present   Extremities: no edema, cyanosis, or clubbing     ASSESSMENT/PLAN:   Assessment   Encounter Diagnoses   Name Primary?    Chronic sinusitis, unspecified location Yes    Seasonal and perennial allergic rhinoconjunctivitis     Flu vaccine need     Need for COVID-19 vaccine     Rhinosinusitis      #1 allergic rhinitis  Continue with current medications including Xyzal Singulair Flonase Astelin  Reviewed avoidance measures and potential treatment option immunotherapy      2.  Chronic sinusitis  Recent  left maxillary pressure.  Worsening  No fevers or mucopurulence.  Check CT of sinuses to further evaluate.  Prior CT from  reviewed.  Prescription for doxycycline provided if acute sinusitis is seen or flare of chronic sinusitis    3.  Flu vaccine in the fall recommended    4.  COVID vaccines reviewed.  Recommend booster.  Most recent booster will be available next month      Cool compresses twice a day #5 mite bites  Trial of triamcinolone twice a day.         Orders This Visit:  No orders of the defined types were placed in this encounter.      Meds This Visit:  Requested Prescriptions     Signed Prescriptions Disp Refills    montelukast 10 MG Oral Tab 90 tablet 2     Sig: Take 1 tablet (10 mg total) by mouth nightly.    azelastine 0.1 % Nasal Solution 3 each 2     Si sprays by Nasal route 2 (two) times daily.     levocetirizine 5 MG Oral Tab 90 tablet 2     Sig: Take 1 tablet (5 mg total) by mouth every evening.    fluticasone propionate (FLONASE) 50 MCG/ACT Nasal Suspension 3 each 2     Si sprays by Nasal route daily.    doxycycline 100 MG Oral Cap 20 capsule 0     Sig: Take 1 capsule (100 mg total) by mouth 2 (two) times daily for 10 days.    triamcinolone 0.1 % External Ointment 60 g 0     Sig: Applied 2 times per day as needed       Imaging & Referrals:  CT SINUS (CPT=70486)     2024  Mitchell Khan MD    If medication samples were provided today, they were provided solely for patient education and training related to self administration of these medications.  Teaching, instruction and sample was provided to the patient by myself.  Teaching included  a review of potential adverse side effects as well as potential efficacy.  Patient's questions were answered in regards to medication administration and dosing and potential side effects. Teaching was provided via the teach back method

## 2024-08-28 NOTE — PATIENT INSTRUCTIONS
#1 allergic rhinitis  Continue with current medications including Xyzal Singulair Flonase Astelin  Reviewed avoidance measures and potential treatment option immunotherapy      2.  Chronic sinusitis  Recent  left maxillary pressure.  Worsening  No fevers or mucopurulence.  Check CT of sinuses to further evaluate.  Prior CT from  reviewed.  Prescription for doxycycline provided if acute sinusitis is seen or flare of chronic sinusitis    3.  Flu vaccine in the fall recommended    4.  COVID vaccines reviewed.  Recommend booster.  Most recent booster will be available next month      Cool compresses twice a day #5 mite bites  Trial of triamcinolone twice a day.         Orders This Visit:  No orders of the defined types were placed in this encounter.      Meds This Visit:  Requested Prescriptions     Signed Prescriptions Disp Refills    montelukast 10 MG Oral Tab 90 tablet 2     Sig: Take 1 tablet (10 mg total) by mouth nightly.    azelastine 0.1 % Nasal Solution 3 each 2     Si sprays by Nasal route 2 (two) times daily.    levocetirizine 5 MG Oral Tab 90 tablet 2     Sig: Take 1 tablet (5 mg total) by mouth every evening.    fluticasone propionate (FLONASE) 50 MCG/ACT Nasal Suspension 3 each 2     Si sprays by Nasal route daily.    doxycycline 100 MG Oral Cap 20 capsule 0     Sig: Take 1 capsule (100 mg total) by mouth 2 (two) times daily for 10 days.    triamcinolone 0.1 % External Ointment 60 g 0     Sig: Applied 2 times per day as needed

## 2024-08-28 NOTE — TELEPHONE ENCOUNTER
Dr. Aden,     Per pt message below for Doxycycline RX:    [facility] will only fill if it is a 3 month supply. Is it possible to have the prescription changed to this? If not, can I have it switched back to [facility]?     Thank you!

## 2024-08-29 RX ORDER — DOXYCYCLINE HYCLATE 20 MG
20 TABLET ORAL 2 TIMES DAILY
Qty: 180 TABLET | Refills: 2 | Status: SHIPPED | OUTPATIENT
Start: 2024-08-29

## 2024-10-17 DIAGNOSIS — C50.812 MALIGNANT NEOPLASM OF OVERLAPPING SITES OF LEFT BREAST IN FEMALE, ESTROGEN RECEPTOR POSITIVE (HCC): ICD-10-CM

## 2024-10-17 DIAGNOSIS — Z17.0 MALIGNANT NEOPLASM OF OVERLAPPING SITES OF LEFT BREAST IN FEMALE, ESTROGEN RECEPTOR POSITIVE (HCC): ICD-10-CM

## 2024-10-18 RX ORDER — TAMOXIFEN CITRATE 20 MG/1
20 TABLET ORAL DAILY
Qty: 90 TABLET | Refills: 3 | Status: SHIPPED | OUTPATIENT
Start: 2024-10-18

## 2024-10-23 ENCOUNTER — OFFICE VISIT (OUTPATIENT)
Dept: OBGYN CLINIC | Facility: CLINIC | Age: 49
End: 2024-10-23
Payer: COMMERCIAL

## 2024-10-23 VITALS
WEIGHT: 156 LBS | HEART RATE: 71 BPM | DIASTOLIC BLOOD PRESSURE: 85 MMHG | BODY MASS INDEX: 27.64 KG/M2 | HEIGHT: 63 IN | SYSTOLIC BLOOD PRESSURE: 131 MMHG

## 2024-10-23 DIAGNOSIS — N95.1 PERIMENOPAUSAL VASOMOTOR SYMPTOMS: ICD-10-CM

## 2024-10-23 DIAGNOSIS — Z85.3 HISTORY OF BREAST CANCER: ICD-10-CM

## 2024-10-23 DIAGNOSIS — Z01.419 ENCOUNTER FOR GYNECOLOGICAL EXAMINATION: Primary | ICD-10-CM

## 2024-10-23 PROCEDURE — 3079F DIAST BP 80-89 MM HG: CPT | Performed by: OBSTETRICS & GYNECOLOGY

## 2024-10-23 PROCEDURE — 3075F SYST BP GE 130 - 139MM HG: CPT | Performed by: OBSTETRICS & GYNECOLOGY

## 2024-10-23 PROCEDURE — 3008F BODY MASS INDEX DOCD: CPT | Performed by: OBSTETRICS & GYNECOLOGY

## 2024-10-23 PROCEDURE — 99396 PREV VISIT EST AGE 40-64: CPT | Performed by: OBSTETRICS & GYNECOLOGY

## 2024-10-23 RX ORDER — VENLAFAXINE HYDROCHLORIDE 75 MG/1
75 CAPSULE, EXTENDED RELEASE ORAL DAILY
Qty: 90 CAPSULE | Refills: 3 | Status: SHIPPED | OUTPATIENT
Start: 2024-10-23

## 2024-10-23 NOTE — PROGRESS NOTES
Alma Hackett is a 49 year old female  Patient's last menstrual period was 10/03/2024 (approximate). here for annual exam.       Last seen 10/12/2022.   Last pap 2021 normal with negative HPV  Last mammogram 2023.    Last MRI 2024    History of left breast cancer in 2020.     Sees Dr Powell.  On tamoxifen    Menses q month.  LMP 10/3/24 x 10 days, light.    Declined STD screen.    On Effexor 37.5 mg for hot flashes and random panic attacks.  Given by PCP.   Still has hot flashes.      OBSTETRICS HISTORY:  OB History    Para Term  AB Living   0 0 0 0 0 0   SAB IAB Ectopic Multiple Live Births   0 0 0 0 0       GYNE HISTORY   Pap Date: 21  Pap Result Notes: PAP HPV NEG  Follow Up Recommendation: mammo 23 bilat neg    MEDICAL HISTORY:  Past Medical History:    Allergic rhinitis    Anxiety    Breast cancer (HCC)    age 44    Conjunctival cyst    At 8 o'clock, OD    Corneal ulcer- right eye    Environmental allergies    Essential hypertension    Failure to thrive in pediatric patient    Headache    Hx of tonsillectomy    Hypertension    Internal hemorrhoids without complication    Lymphedema    Psoriasis     Past Surgical History:   Procedure Laterality Date    Colonoscopy N/A 2018    Procedure: COLONOSCOPY;  Surgeon: Ekta Villatoro MD;  Location: [facility] ENDO    Colonoscopy N/A 2024    Procedure: COLONOSCOPY;  Surgeon: Francesca Steven MD;  Location: [facility] ENDO    Excision turbinate,submucous  2016    Lumpectomy left Left 2020    John biopsy stereo nodule 1 site left (cpt=19081) Left 2022    CLIP PLACED X    Mouth surgery proc unlisted  2011    Jaw surgery/metal implant    Needle biopsy left Left     2020    Other surgical history  Osteomyelitis of the jaw    Radiation left Left     Removal of tonsils,10+ y/o  2016    Repair of nasal septum  2016    Tonsillectomy         SOCIAL HISTORY:  Social History      Socioeconomic History    Marital status:    Tobacco Use    Smoking status: Never     Passive exposure: Never    Smokeless tobacco: Never    Tobacco comments:     Grew up with a smoker, no present household smokers.    Substance and Sexual Activity    Alcohol use: No     Comment: rarely    Drug use: Yes     Comment: none    Sexual activity: Yes     Partners: Male   Other Topics Concern    Caffeine Concern Yes     Comment: 16 oz soda daily    Grew up on a farm No    History of tanning No    Outdoor occupation No    Pt has a pacemaker No    Pt has a defibrillator No    Breast feeding No    Reaction to local anesthetic No       FAMILY HISTORY:  Family History   Problem Relation Age of Onset    Colon Cancer Father 65        Cause of death    Cancer Father     Stroke Mother         Cause of death    Hypertension Mother     Diabetes Maternal Grandmother     Hypertension Sister     Hypertension Brother     Breast Cancer Self 44    Glaucoma Neg     Macular degeneration Neg     Ovarian Cancer Neg        MEDICATIONS:  Current Outpatient Medications   Medication Sig Dispense Refill    venlafaxine ER 75 MG Oral Capsule SR 24 Hr Take 1 capsule (75 mg total) by mouth daily. 90 capsule 3    tamoxifen 20 MG Oral Tab Take 1 tablet (20 mg total) by mouth daily. 90 tablet 3    Doxycycline Hyclate 20 MG Oral Tab Take 1 tablet (20 mg total) by mouth 2 (two) times daily. 180 tablet 2    montelukast 10 MG Oral Tab Take 1 tablet (10 mg total) by mouth nightly. 90 tablet 2    azelastine 0.1 % Nasal Solution 2 sprays by Nasal route 2 (two) times daily. 3 each 2    levocetirizine 5 MG Oral Tab Take 1 tablet (5 mg total) by mouth every evening. 90 tablet 2    fluticasone propionate (FLONASE) 50 MCG/ACT Nasal Suspension 2 sprays by Nasal route daily. 3 each 2    triamcinolone 0.1 % External Ointment Applied 2 times per day as needed 60 g 0    Apremilast (OTEZLA) 30 MG Oral Tab Take 1 tablet by mouth 2 (two) times daily. 180 tablet 1     losartan 50 MG Oral Tab Take 1 tablet (50 mg total) by mouth daily. 90 tablet 3    venlafaxine ER 37.5 MG Oral Capsule SR 24 Hr Take 1 capsule (37.5 mg total) by mouth daily. 90 capsule 3    halobetasol 0.05 % External Cream Apply bid to affected area for 10 to 14 days 100 g 0    Diclofenac Sodium 1 % External Gel Apply 2 g topically 4 (four) times daily. Apply thin layer to affected joint. 100 g 3    Cholecalciferol (VITAMIN D3) 25 MCG (1000 UT) Oral Cap Take 1 tablet by mouth daily.      Clobetasol Propionate 0.05 % External Foam Use bid 100 g 6    Fluocinolone Acetonide (DERMOTIC) 0.01 % Otic Oil Use qhs to ears for psoriasis 20 mL 6    Emollient (ELETONE) Apply Externally Cream Use bid 200 g 6    calcipotriene 0.005 % External Cream Apply topically. apply by TOPICAL route 2 times every day a thin film to the affected skin areasand rub in gently and completely         ALLERGIES:  Allergies[1]      Depression Screening (PHQ-2/PHQ-9): Over the LAST 2 WEEKS   Little interest or pleasure in doing things (over the last two weeks)?: Not at all    Feeling down, depressed, or hopeless (over the last two weeks)?: Not at all    PHQ-2 SCORE: 0           Review of Systems:  Constitutional:  Denies fatigue, night sweats, hot flashes  Eyes:  denies blurred or double vision  Cardiovascular:  denies chest pain or palpitations  Respiratory:  denies shortness of breath  Gastrointestinal:  denies heartburn, abdominal pain, diarrhea or constipation  Genitourinary:  denies dysuria, incontinence, abnormal vaginal discharge, vaginal itching  Musculoskeletal:  denies back pain.  Skin/Breast:  Denies any breast pain, lumps, or discharge.   Neurological:  denies headaches, extremity weakness or numbness.  Psychiatric: denies depression or anxiety.  Endocrine:   denies excessive thirst or urination.  Heme/Lymph:  easy bruising or bleeding.    PHYSICAL EXAM:   /85   Pulse 71   Ht 5' 3\" (1.6 m)   Wt 156 lb (70.8 kg)   LMP  10/03/2024 (Approximate)   BMI 27.63 kg/m²   Wt Readings from Last 2 Encounters:   10/23/24 156 lb (70.8 kg)   07/10/24 155 lb 3.2 oz (70.4 kg)     Body mass index is 27.63 kg/m².    Constitutional: well developed, well nourished  Neck/Thyroid: thyroid symmetric, no nodules  Heart:  Regular rate and rhythm  Lungs:  Clear to asculation  Breast: normal without palpable masses, tenderness, asymmetry, nipple discharge, nipple retraction or skin changes  Abdomen:  soft, nontender, nondistended, no masses  Psychiatric:  Oriented to time, place, person and situation. Appropriate mood and affect    Pelvic Exam:  External Genitalia: normal appearance, hair distribution, and no lesions  Urethral Meatus:  normal in size, location, without lesions and prolapse  Bladder:  No fullness, masses or tenderness  Vagina:  Normal appearance without lesions, no abnormal discharge  Cervix:  Normal without tenderness on motion  Uterus: normal in size, contour, position, mobility, without tenderness  Adnexa: normal without masses or tenderness  Perineum/anus: normal      Assessment & Plan:    Alma Hackett is a 49 year old female who presents for an annual physical exam.    1. Encounter for gynecological examination  Pap and HPV.   Will do Pap/HPV q 3 years.   Annual exams encouraged.   RTC 1 year or prn     2. History of breast cancer    3.  Perimenopausal vasomotor symptoms  Will increase Effexor to standard dosage for hot flashes.  Rx Effexor ER 75 mg q day          Requested Prescriptions     Signed Prescriptions Disp Refills    venlafaxine ER 75 MG Oral Capsule SR 24 Hr 90 capsule 3     Sig: Take 1 capsule (75 mg total) by mouth daily.         Christiana Aguila MD  10/23/2024  6:50 PM         [1] No Known Allergies

## 2024-10-24 LAB — HPV E6+E7 MRNA CVX QL NAA+PROBE: NEGATIVE

## 2024-11-08 ENCOUNTER — HOSPITAL ENCOUNTER (OUTPATIENT)
Dept: CT IMAGING | Age: 49
Discharge: HOME | End: 2024-11-08
Attending: ALLERGY & IMMUNOLOGY
Payer: COMMERCIAL

## 2024-11-08 DIAGNOSIS — J32.9 CHRONIC SINUSITIS, UNSPECIFIED LOCATION: ICD-10-CM

## 2024-11-08 PROCEDURE — 70486 CT MAXILLOFACIAL W/O DYE: CPT | Performed by: ALLERGY & IMMUNOLOGY

## 2024-11-11 ENCOUNTER — TELEPHONE (OUTPATIENT)
Dept: ALLERGY | Facility: CLINIC | Age: 49
End: 2024-11-11

## 2024-11-11 NOTE — TELEPHONE ENCOUNTER
Received a fax from [facility] with an approval for CT Sinus Scan approved 11/6/24 through 1/4/24.    Patient has CT Scan on 11/8/24.     Will send approval to scan.

## 2024-11-11 NOTE — TELEPHONE ENCOUNTER
Spoke with patient. Verified name and . Informed patient of test results per Dr. Khan. Patient verbalizes understanding.

## 2024-11-11 NOTE — TELEPHONE ENCOUNTER
----- Message from Mitchell Khan sent at 11/11/2024 10:58 AM CST -----   Please contact patient with recent CT of sinuses.  Paranasal sinuses are well aerated.  No evidence of acute or chronic sinusitis.  There is a slight nasal septal deviation to the left.  Sinuses unremarkable

## 2024-11-15 ENCOUNTER — TELEPHONE (OUTPATIENT)
Dept: HEMATOLOGY/ONCOLOGY | Facility: HOSPITAL | Age: 49
End: 2024-11-15

## 2024-11-15 NOTE — TELEPHONE ENCOUNTER
[facility]   [phone]   OPT 3  give patient  name and :   We faxed over information for compression garment to [phone] . Would like to know status.   Thanks Duyen

## 2024-11-21 NOTE — TELEPHONE ENCOUNTER
[facility] called. Informed faxed several times.Absolute provided an additional fax number. Faxed to [phone].

## 2024-11-21 NOTE — TELEPHONE ENCOUNTER
Nabor Robertson at [facility] called and advised they need a script for compression garments for the pt. Ailyn stated it was faxed again on 11/15/24 for the provider to fill out in it's entirety.     I reviewed note from Dilcia and advised Ailyn of it and she stated they have never received the script back     Please give a call to [phone] option 3, fax# [phone]    When calling please reference pt name and  and can speak to Dago Robertson or Fany

## 2024-11-22 ENCOUNTER — HOSPITAL ENCOUNTER (OUTPATIENT)
Dept: MAMMOGRAPHY | Age: 49
Discharge: HOME | End: 2024-11-22
Attending: INTERNAL MEDICINE
Payer: COMMERCIAL

## 2024-11-22 DIAGNOSIS — Z12.31 SCREENING MAMMOGRAM, ENCOUNTER FOR: ICD-10-CM

## 2024-11-22 PROCEDURE — 77067 SCR MAMMO BI INCL CAD: CPT | Performed by: INTERNAL MEDICINE

## 2024-11-22 PROCEDURE — 77063 BREAST TOMOSYNTHESIS BI: CPT | Performed by: INTERNAL MEDICINE

## 2024-12-09 ENCOUNTER — APPOINTMENT (OUTPATIENT)
Dept: HEMATOLOGY/ONCOLOGY | Facility: HOSPITAL | Age: 49
End: 2024-12-09
Attending: INTERNAL MEDICINE
Payer: COMMERCIAL

## 2024-12-23 NOTE — TELEPHONE ENCOUNTER
Refill Request for medication(s):     Last Office Visit: 06/01/2024    Last Refill: 06/01/2024    Pharmacy, Dosage verified: yes    Condition Update (if applicable):     Rx pended and sent to provider for approval, please advise. Thank You!

## 2024-12-24 RX ORDER — APREMILAST 30 MG/1
1 TABLET, FILM COATED ORAL 2 TIMES DAILY
Qty: 180 TABLET | Refills: 2 | Status: SHIPPED | OUTPATIENT
Start: 2024-12-24

## 2025-01-08 ENCOUNTER — OFFICE VISIT (OUTPATIENT)
Age: 50
End: 2025-01-08
Attending: GENETIC COUNSELOR, MS
Payer: COMMERCIAL

## 2025-01-08 VITALS
SYSTOLIC BLOOD PRESSURE: 145 MMHG | HEART RATE: 82 BPM | OXYGEN SATURATION: 96 % | DIASTOLIC BLOOD PRESSURE: 88 MMHG | TEMPERATURE: 99 F | BODY MASS INDEX: 28.86 KG/M2 | WEIGHT: 156.81 LBS | RESPIRATION RATE: 16 BRPM | HEIGHT: 62 IN

## 2025-01-08 DIAGNOSIS — Z08 ENCOUNTER FOR FOLLOW-UP SURVEILLANCE OF BREAST CANCER: ICD-10-CM

## 2025-01-08 DIAGNOSIS — Z12.31 SCREENING MAMMOGRAM, ENCOUNTER FOR: ICD-10-CM

## 2025-01-08 DIAGNOSIS — Z85.3 ENCOUNTER FOR FOLLOW-UP SURVEILLANCE OF BREAST CANCER: ICD-10-CM

## 2025-01-08 DIAGNOSIS — Z51.81 ENCOUNTER FOR MONITORING TAMOXIFEN THERAPY: ICD-10-CM

## 2025-01-08 DIAGNOSIS — C50.812 MALIGNANT NEOPLASM OF OVERLAPPING SITES OF LEFT BREAST IN FEMALE, ESTROGEN RECEPTOR POSITIVE (HCC): Primary | ICD-10-CM

## 2025-01-08 DIAGNOSIS — Z17.0 MALIGNANT NEOPLASM OF OVERLAPPING SITES OF LEFT BREAST IN FEMALE, ESTROGEN RECEPTOR POSITIVE (HCC): Primary | ICD-10-CM

## 2025-01-08 DIAGNOSIS — Z12.39 BREAST CANCER SCREENING OTHER THAN MAMMOGRAM: ICD-10-CM

## 2025-01-08 DIAGNOSIS — Z79.810 ENCOUNTER FOR MONITORING TAMOXIFEN THERAPY: ICD-10-CM

## 2025-01-08 RX ORDER — PROCHLORPERAZINE MALEATE 10 MG
10 TABLET ORAL DAILY PRN
Qty: 90 TABLET | Refills: 1 | Status: SHIPPED | OUTPATIENT
Start: 2025-01-08

## 2025-01-08 NOTE — PROGRESS NOTES
HPI     Alma Hackett is a 49 year old female who is here today for follow-up diagnosis of   Encounter Diagnoses   Name Primary?    Malignant neoplasm of overlapping sites of left breast in female, estrogen receptor positive (HCC) Yes    Encounter for monitoring tamoxifen therapy     Encounter for follow-up surveillance of breast cancer     Screening mammogram, encounter for       Compliance with SBE.   Still palpated on L breast, non tender small sl raised and not bigger in size.     Compliant with tamoxifen.  No hot flashes on venlafaxine. . Some nausea after taking tamoxifen, she takes in am after breakfast and has nausea for about 3 hrs.    Appetite good, weight is stable.     Still working full time.     Wearing compression sleeve and compression bra. HEP at home at times. Good ROM and no pain.  Using compression pump once a week.     LMP beginning of January of 2025, lasted 10 days, states was a little different.  States still regular.        ECOG PS 0         Review of Systems:   Review of Systems   Constitutional:  Negative for appetite change, fatigue and unexpected weight change.   Respiratory:  Negative for cough and shortness of breath.    Cardiovascular:  Negative for chest pain, leg swelling and palpitations.   Gastrointestinal:  Negative for abdominal pain.   Endocrine: Negative for hot flashes.   Genitourinary:  Negative for menstrual problem and pelvic pain.    Musculoskeletal:  Positive for arthralgias (hands.). Negative for back pain, myalgias and neck pain.        No bone pain.    Skin:         Hair thinning.   Neurological:  Positive for headaches (with period.). Negative for dizziness.   Hematological:  Negative for adenopathy. Bruises/bleeds easily.   Psychiatric/Behavioral:  Positive for sleep disturbance (due to 2 new puppies). The patient is not nervous/anxious.          Current Outpatient Medications   Medication Sig Dispense Refill    Apremilast (OTEZLA) 30 MG Oral Tab Take 1  tablet by mouth 2 (two) times daily. 180 tablet 2    venlafaxine ER 75 MG Oral Capsule SR 24 Hr Take 1 capsule (75 mg total) by mouth daily. 90 capsule 3    tamoxifen 20 MG Oral Tab Take 1 tablet (20 mg total) by mouth daily. 90 tablet 3    Doxycycline Hyclate 20 MG Oral Tab Take 1 tablet (20 mg total) by mouth 2 (two) times daily. 180 tablet 2    montelukast 10 MG Oral Tab Take 1 tablet (10 mg total) by mouth nightly. 90 tablet 2    azelastine 0.1 % Nasal Solution 2 sprays by Nasal route 2 (two) times daily. 3 each 2    levocetirizine 5 MG Oral Tab Take 1 tablet (5 mg total) by mouth every evening. 90 tablet 2    fluticasone propionate (FLONASE) 50 MCG/ACT Nasal Suspension 2 sprays by Nasal route daily. 3 each 2    triamcinolone 0.1 % External Ointment Applied 2 times per day as needed 60 g 0    losartan 50 MG Oral Tab Take 1 tablet (50 mg total) by mouth daily. 90 tablet 3    venlafaxine ER 37.5 MG Oral Capsule SR 24 Hr Take 1 capsule (37.5 mg total) by mouth daily. 90 capsule 3    halobetasol 0.05 % External Cream Apply bid to affected area for 10 to 14 days 100 g 0    Diclofenac Sodium 1 % External Gel Apply 2 g topically 4 (four) times daily. Apply thin layer to affected joint. 100 g 3    Cholecalciferol (VITAMIN D3) 25 MCG (1000 UT) Oral Cap Take 1 tablet by mouth daily.      Clobetasol Propionate 0.05 % External Foam Use bid 100 g 6    Fluocinolone Acetonide (DERMOTIC) 0.01 % Otic Oil Use qhs to ears for psoriasis 20 mL 6    Emollient (ELETONE) Apply Externally Cream Use bid 200 g 6    calcipotriene 0.005 % External Cream Apply topically. apply by TOPICAL route 2 times every day a thin film to the affected skin areasand rub in gently and completely       Allergies:   No Known Allergies    Past Medical History:    Allergic rhinitis    Anxiety    Breast cancer (HCC)    age 44    Conjunctival cyst    At 8 o'clock, OD    Corneal ulcer- right eye    Environmental allergies    Essential hypertension    Failure to  thrive in pediatric patient    Headache    Hx of tonsillectomy    Hypertension    Internal hemorrhoids without complication    Lymphedema    Psoriasis     Past Surgical History:   Procedure Laterality Date    Colonoscopy N/A 09/11/2018    Procedure: COLONOSCOPY;  Surgeon: Ekta Villatoro MD;  Location: [facility]    Colonoscopy N/A 03/14/2024    Procedure: COLONOSCOPY;  Surgeon: Francesca Steven MD;  Location: [facility]    Excision turbinate,submucous  06/23/2016    Lumpectomy left Left 09/14/2020    John biopsy stereo nodule 1 site left (cpt=19081) Left 12/02/2022    CLIP PLACED X    Mouth surgery proc unlisted  12/05/2011    Jaw surgery/metal implant    Needle biopsy left Left     8/14/2020    Other surgical history  Osteomyelitis of the jaw    Radiation left Left     Removal of tonsils,10+ y/o  06/23/2016    Repair of nasal septum  06/23/2016    Tonsillectomy  2016     Social History     Socioeconomic History    Marital status:    Tobacco Use    Smoking status: Never     Passive exposure: Never    Smokeless tobacco: Never    Tobacco comments:     Grew up with a smoker, no present household smokers.    Substance and Sexual Activity    Alcohol use: No     Comment: rarely    Drug use: Yes     Comment: none    Sexual activity: Yes     Partners: Male   Other Topics Concern    Caffeine Concern Yes     Comment: 16 oz soda daily    Grew up on a farm No    History of tanning No    Outdoor occupation No    Pt has a pacemaker No    Pt has a defibrillator No    Breast feeding No    Reaction to local anesthetic No       Family History   Problem Relation Age of Onset    Colon Cancer Father 65        Cause of death    Cancer Father     Stroke Mother         Cause of death    Hypertension Mother     Diabetes Maternal Grandmother     Hypertension Sister     Hypertension Brother     Breast Cancer Self 44    Glaucoma Neg     Macular degeneration Neg     Ovarian Cancer Neg          PHYSICAL EXAM:    /88 (BP  Location: Left arm, Patient Position: Sitting, Cuff Size: adult)   Pulse 82   Temp 99.1 °F (37.3 °C) (Oral)   Resp 16   Ht 1.575 m (5' 2\")   Wt 71.1 kg (156 lb 12.8 oz)   LMP 10/03/2024 (Approximate)   SpO2 96%   BMI 28.68 kg/m²   Wt Readings from Last 6 Encounters:   01/08/25 71.1 kg (156 lb 12.8 oz)   10/23/24 70.8 kg (156 lb)   07/10/24 70.4 kg (155 lb 3.2 oz)   06/04/24 69.3 kg (152 lb 12.8 oz)   05/15/24 69.4 kg (153 lb)   03/14/24 65.8 kg (145 lb)     Physical Exam  General: Patient is alert, not in acute distress.  HEENT: EOMs intact. PERRL.  Neck: No JVD. No palpable lymphadenopathy. Neck is supple.  Chest: Clear to auscultation.  Breasts:  R breast no mass, L breast with surgical scars at breast and axilla and RT skin changes and lymphedema well controlled, no mass. 5 o'clock smaller size of eraser sl raised, dry non tender, benign.   Heart: Regular rate and rhythm.   Abdomen: Soft, non tender with good bowel sounds.    Extremities: No edema.  Neurological: Grossly intact.   Lymphatics: There is no palpable lymphadenopathy throughout in the cervical, supraclavicular, axillary, or inguinal regions.  Psych/Depression: nl        ASSESSMENT/PLAN:     Encounter Diagnoses   Name Primary?    Malignant neoplasm of overlapping sites of left breast in female, estrogen receptor positive (HCC) Yes    Encounter for monitoring tamoxifen therapy     Encounter for follow-up surveillance of breast cancer     Screening mammogram, encounter for         Cancer Staging   Malignant neoplasm of overlapping sites of left breast in female, estrogen receptor positive (HCC)  Staging form: Breast, AJCC 8th Edition  - Pathologic stage from 9/18/2020: Stage IA (pT1b, pN0(sn), cM0, G2, ER+, WY+, HER2-, Oncotype DX score: 20) - Signed by Lonnie Powell MD on 9/29/2020    Alma Hackett is a 49 year old female with ER/WY positive breast cancer, node negative.    Patient completed local control of treatment with lumpectomy  and radiation therapy.    Oncotype score of 20, which per data from TAILORX trial the risk of distant recurrence at 9 years with 5 years of adjuvant hormonal therapy is 6% in the group average absolute benefit from chemotherapy is less than 1%.  Discussed with the patient that when stratified by age patient's less than or equal to 50 years old the benefit from chemotherapy and is approximately 1.6%.  Discussed with the patient the potential side effects of chemotherapy, and also discussed with the patient that there is option for 10 years of adjuvant hormonal therapy to improve outcomes.     Given the patient's low risk or recurrence, and marginal benefit from chemotherapy, do not recommend adjuvant chemotherapy.  She is on adjuvant hormonal therapy alone.    Patient is premenopausal.      Taking tamoxifen daily and is tolerating well.  She will complete treatment with tamoxifen on November of 2025. Discussed that she is supposed to take the AI at the same time every day and that the therapy will be for 5-10 yrs total.  Will submit BCI to determine if 5 vs 10 yrs.     Bilateral mammogram 11/22/2024 BI-RADS 2, due again in 1 year.    MRI of the breasts on 4/12/2024 BIRADS-2, next due in April of 2025.    Discussed with the patient that when she becomes postmenopausal, once this is confirmed, will then switch to an aromatase inhibitor to complete her adjuvant therapy course.      All questions answered to the best of my abilities.  Support provided to the patient.      MDM - mod  No follow-ups on file.  No orders of the defined types were placed in this encounter.    Results From Past 48 Hours:  No results found for this or any previous visit (from the past 48 hours).      Imaging & Referrals:  None   PROCEDURE: [facility] BONNIE 2D+3D SCREENING BILAT ([id number])     COMPARISON: [facility] POST PROCEDURAL IMAGE LEFT (CPT=77065), 8/14/2020, 11:27 AM.  [facility] POST  PROCEDURAL IMAGE LEFT (CPT=77065), 9/14/2020, 10:48 AM.  [facility] BONNIE   2D+3D DIAGNOSTIC HARMONY LEFT (CPT=77065/18015), 5/10/2021, 2:00 PM.  [facility] BONNIE 2D+3D DIAGNOSTIC HARMONY BILAT (AFR=[id number]), 11/09/2021, 2:27 PM.  [facility] BONNIE 2D+3D DIAGNOSTIC HARMONY BILAT  (OSB=[id number]), 11/18/2022, 1:23 PM.  [facility] BIOPSY STEREO W/BONNIE GUIDE 1 SITE LEFT (CPT=19081), 12/02/2022, 7:54 AM.  [facility] BONNIE 2D+3D DIAGNOSTIC HARMONY LEFT (CPT=77065/23785),  5/19/2023, 1:34 PM.  [facility] BONNIE 2D+3D DIAGNOSTIC HARMONY BILAT (DAW=[id number]), 11/17/2023, 1:50 PM.     INDICATIONS: Z12.31 Screening mammogram, encounter for     TECHNIQUE: Full field direct screening mammography was performed and images were reviewed with the 2-Observe DASH 1.5.1.5 CAD device.  3D tomosynthesis was performed and reviewed        BREAST COMPOSITION:   Category b-Scattered areas fibroglandular density.       FINDINGS:     There are postsurgical changes in the left breast. The parenchyma pattern is stable with no new suspicious mass, architectural distortion, or microcalcifications identified in either breast.  Biopsy clip again noted in the central left breast.    Benign-appearing calcifications again noted.                       Impression  CONCLUSION:    No mammographic evidence of malignancy in either breast.  As long as clinical examination remains benign, annual screening mammogram is recommended in 1 year.     Annual screening breast MRI in conjunction with screening mammogram at alternating 6 month intervals is recommended, due to patient's high-risk status.        BI-RADS CATEGORY:    DIAGNOSTIC CATEGORY 2 - BENIGN FINDING:       RECOMMENDATIONS:  ROUTINE MAMMOGRAM AND CLINICAL EVALUATION IN 12 MONTHS.                   PLEASE NOTE: NORMAL MAMMOGRAM DOES NOT EXCLUDE THE  POSSIBILITY OF BREAST CANCER.  A CLINICALLY SUSPICIOUS PALPABLE LUMP SHOULD BE BIOPSIED.       For patients over the age of 40, the target due date for the patient's next mammogram has been entered into a reminder system.       Patient received a discharge summary from the technologist after completion of exam.     Breast marker legend used on images    Triangle = Palpable lump  [place] = Skin tag or mole  BB = Nipple  Linear josey = Scar  Square = Pain           Dictated by (CST): Erika Wagoner MD on 11/25/2024 at 7:38 AM      Finalized by (CST): Erika Wagoner MD on 11/25/2024 at 7:43 AM          [address] [address]  [phone]

## 2025-01-16 ENCOUNTER — LAB REQUISITION (OUTPATIENT)
Dept: LAB | Facility: HOSPITAL | Age: 50
End: 2025-01-16
Payer: COMMERCIAL

## 2025-01-16 DIAGNOSIS — C50.812 MALIGNANT NEOPLASM OF OVERLAPPING SITES OF LEFT FEMALE BREAST (HCC): ICD-10-CM

## 2025-01-16 PROCEDURE — 88363 XM ARCHIVE TISSUE MOLEC ANAL: CPT | Performed by: INTERNAL MEDICINE

## 2025-01-20 ENCOUNTER — TELEPHONE (OUTPATIENT)
Dept: INTERNAL MEDICINE CLINIC | Facility: CLINIC | Age: 50
End: 2025-01-20

## 2025-01-20 NOTE — TELEPHONE ENCOUNTER
Family Medical Leave Act forms received in forms dept. Logged for processing. [facility] message sent for completion of Release of Information.

## 2025-01-20 NOTE — TELEPHONE ENCOUNTER
Called patient to obtain details, per patient forms are recert all will remain the same. Start date 01/30/25-6mos.

## 2025-01-20 NOTE — TELEPHONE ENCOUNTER
Dr. Franco,    Please sign off on form if you agree to: Intermittent Family Medical Leave Act due to breast cancer. 1-2 flare ups a month, each lasting 1 day. 1-5 appts a month, each lasting 1-4 hrs.    -Signature page will be the first page scanned  -From your Inbasket, Highlight the patient and click Chart   -Double click the 01/20/2025 Forms Completion telephone encounter  -Scroll down to the Media section   -Click the blue Hyperlink: Family Medical Leave Act Dr Franco 01/20/25  -Click Acknowledge located in the top right ribbon/menu   -Drag the mouse into the blank space of the document and a + sign will appear. Left click to   electronically sign the document.  -Once signed, simply exit out of the screen and you signature will be saved.     Thank you,    Judith

## 2025-01-21 NOTE — TELEPHONE ENCOUNTER
Valid Release of Information signed on 1/21/25 received via [employer]. Completed forms faxed to [employer] &  fax number [phone]. Confirmation received.

## 2025-02-07 ENCOUNTER — TELEPHONE (OUTPATIENT)
Dept: DERMATOLOGY CLINIC | Facility: CLINIC | Age: 50
End: 2025-02-07

## 2025-03-21 ENCOUNTER — TELEMEDICINE (OUTPATIENT)
Dept: TELEHEALTH | Age: 50
End: 2025-03-21
Payer: COMMERCIAL

## 2025-03-21 DIAGNOSIS — J06.9 UPPER RESPIRATORY TRACT INFECTION, UNSPECIFIED TYPE: Primary | ICD-10-CM

## 2025-03-21 PROCEDURE — 98004 SYNCH AUDIO-VIDEO EST SF 10: CPT | Performed by: PHYSICIAN ASSISTANT

## 2025-03-21 NOTE — PATIENT INSTRUCTIONS
Upper respiratory infection supportive care measures to try as applicable:  General:   - There are multiple viruses that cause similar symptoms, including: Influenza, Rhinovirus, Adenovirus, Coronaviruses (including Covid-19), RSV, parainfluenza, etc.   - Duration of symptoms typically depends on your body's ability to heal itself, which can be affected in many ways including metabolic health, diet, and genetics.    - Symptoms typically last between 7-days to 3-weeks   - Most medications do not not cure the illness, but may help to alleviate your symptoms. However, evidence is not strong.   - Antibiotics are NOT effective for viral illnesses   - Wash hands often   - Disinfect your environment, linens, electronics, etc. to limit viral spread   - Change toothbrush to limit viral spread   - Drink plenty of fluids (water, Pedialyte, etc.)   - Get plenty of rest and sleep with head elevated to help with sinus drainage and throat irritation   - Do not share utensils or drinks   - Alternate Ibuprofen (adult: 600mg) and Tylenol (adult: 650-1000mg) as needed for pain / body aches / fever   - Take a multivitamin and extra Vitamin D (~2000IU) daily, year round   - Taking 2000mg-3000mg of Vitamin C divided twice daily (1000mg -1500 mg twice a day) while ill can help boost and support the immune system.    - You may benefit from Zicam (zinc) taken every 3 hours at onset of symptoms and until symptoms resolve.  Zinc can inhibit viral replication particularly in rhinoviruses. It also enhances immune response and has some anti-inflammatory effects.       Sore throat:   - Salt water gargles throughout the day for sore throat   - Cepacol or Ricola lozenges as needed for sore throat     Cough / Sinus:   - Using saline spray, Xlear drug free nasal spray with Xylitol, or a couple drops into nostrils a couple times a day can help with sinus inflammation (Use nasal spray with nose forward and applicator tip pointed towards outside of eye.  Breath normally. You should not feel medication go down your throat.)   - Avoid having air blow on your face as this can worsen congestion / cough   - You may benefit from spoonfuls of honey (and/or added to warm drinks) throughout the day for cough   - You may benefit from taking a decongestant (e.g. Sudafed - pseudoephedrine [behind the pharmacy counter]) (may temporarily elevate your heart rate and blood pressure). Avoid if you have a history of high blood pressure.  -For severe nasal congestion you may trial Afrin or Xlear nasal spray with oxymetazoline but cannot be used for more than 3 days as it can cause rebound congestion if used longer than that.   - You may benefit from cough medication containing \"Dextromethorphan\" (e.g. Delsym)   - You may benefit from using a humidifier and/or steam showers

## 2025-03-21 NOTE — PROGRESS NOTES
Virtual/Telephone Check-In    Alma Hackett verbally consents to a Virtual/Telephone Check-In service on 03/21/25.  Patient has been referred to the [facility] website at [email or web address] to review the yearly Consent to Treat document.  Patient understands and accepts financial responsibility for any deductible, co-insurance and/or co-pays associated with this service.       Telehealth Verbal Consent   I conducted a telehealth visit with Alma Hackett today, 03/21/25, which was completed using two-way, real-time interactive audio and video communication. This has been done in good jose alejandro to provide continuity of care in the best interest of the provider-patient relationship, due to the COVID -19 public health crisis/national emergency where restrictions of face-to-face office visits are ongoing. Every conscious effort was taken to allow for sufficient and adequate time to complete the visit.  The patient was made aware of the limitations of the telehealth visit, including treatment limitations as no physical exam could be performed.  The patient was advised to call 911 or to go to the ER in case there was an emergency.  The patient was also advised of the potential privacy & security concerns related to the telehealth platform.   The patient was made aware of where to find [facility]'s notice of privacy practices, telehealth consent form and other related consent forms and documents.  which are located on the [facility] website. The patient verbally agreed to telehealth consent form, related consents and the risks discussed.    Lastly, the patient confirmed that they were in [place].   Included in this visit, time may have been spent reviewing labs, medications, radiology tests and decision making. Appropriate medical decision-making and tests are ordered as detailed in the plan of care above.  Coding/billing information is submitted for this visit based on complexity of care and/or time spent for the  visit.    CHIEF COMPLAINT:  Chief Complaint   Patient presents with    Upper Respiratory Infection       HPI:  Alma Hackett is a 49 year old female who presents for a video visit.  Patient reports nasal congestion, post nasal drip, cough, chills, sore throat and pressure around eyes for 2 days. Reports low grade temp of 99.5F last night.  Patient denies breathing issues, neck stiffness, visual changes.  Patient has tried Claritin, montekulast and Xyzal for symptoms, which has not seemed to help.     Current Outpatient Medications   Medication Sig Dispense Refill    prochlorperazine (COMPAZINE) 10 mg tablet Take 1 tablet (10 mg total) by mouth daily as needed for Nausea. 90 tablet 1    Apremilast (OTEZLA) 30 MG Oral Tab Take 1 tablet by mouth 2 (two) times daily. 180 tablet 2    venlafaxine ER 75 MG Oral Capsule SR 24 Hr Take 1 capsule (75 mg total) by mouth daily. 90 capsule 3    tamoxifen 20 MG Oral Tab Take 1 tablet (20 mg total) by mouth daily. 90 tablet 3    Doxycycline Hyclate 20 MG Oral Tab Take 1 tablet (20 mg total) by mouth 2 (two) times daily. 180 tablet 2    montelukast 10 MG Oral Tab Take 1 tablet (10 mg total) by mouth nightly. 90 tablet 2    azelastine 0.1 % Nasal Solution 2 sprays by Nasal route 2 (two) times daily. 3 each 2    levocetirizine 5 MG Oral Tab Take 1 tablet (5 mg total) by mouth every evening. 90 tablet 2    fluticasone propionate (FLONASE) 50 MCG/ACT Nasal Suspension 2 sprays by Nasal route daily. 3 each 2    triamcinolone 0.1 % External Ointment Applied 2 times per day as needed 60 g 0    losartan 50 MG Oral Tab Take 1 tablet (50 mg total) by mouth daily. 90 tablet 3    venlafaxine ER 37.5 MG Oral Capsule SR 24 Hr Take 1 capsule (37.5 mg total) by mouth daily. 90 capsule 3    halobetasol 0.05 % External Cream Apply bid to affected area for 10 to 14 days 100 g 0    Diclofenac Sodium 1 % External Gel Apply 2 g topically 4 (four) times daily. Apply thin layer to affected joint.  100 g 3    Cholecalciferol (VITAMIN D3) 25 MCG (1000 UT) Oral Cap Take 1 tablet by mouth daily.      Clobetasol Propionate 0.05 % External Foam Use bid 100 g 6    Fluocinolone Acetonide (DERMOTIC) 0.01 % Otic Oil Use qhs to ears for psoriasis 20 mL 6    Emollient (ELETONE) Apply Externally Cream Use bid 200 g 6    calcipotriene 0.005 % External Cream Apply topically. apply by TOPICAL route 2 times every day a thin film to the affected skin areasand rub in gently and completely       Past Medical History:    Allergic rhinitis    Anxiety    Breast cancer (HCC)    age 44    Conjunctival cyst    At 8 o'clock, OD    Corneal ulcer- right eye    Environmental allergies    Essential hypertension    Failure to thrive in pediatric patient    Headache    Hx of tonsillectomy    Hypertension    Internal hemorrhoids without complication    Lymphedema    Psoriasis     Past Surgical History:   Procedure Laterality Date    Colonoscopy N/A 09/11/2018    Procedure: COLONOSCOPY;  Surgeon: Ekta Villatoro MD;  Location: [facility] ENDO    Colonoscopy N/A 03/14/2024    Procedure: COLONOSCOPY;  Surgeon: Francesca Steven MD;  Location: [facility] ENDO    Excision turbinate,submucous  06/23/2016    Lumpectomy left Left 09/14/2020    John biopsy stereo nodule 1 site left (cpt=19081) Left 12/02/2022    CLIP PLACED X    Mouth surgery proc unlisted  12/05/2011    Jaw surgery/metal implant    Needle biopsy left Left     8/14/2020    Other surgical history  Osteomyelitis of the jaw    Radiation left Left     Removal of tonsils,10+ y/o  06/23/2016    Repair of nasal septum  06/23/2016    Tonsillectomy  2016       Social History     Socioeconomic History    Marital status:    Tobacco Use    Smoking status: Never     Passive exposure: Never    Smokeless tobacco: Never    Tobacco comments:     Grew up with a smoker, no present household smokers.    Substance and Sexual Activity    Alcohol use: No     Comment: rarely    Drug use: Yes     Comment:  none    Sexual activity: Yes     Partners: Male   Other Topics Concern    Caffeine Concern Yes     Comment: 16 oz soda daily    Grew up on a farm No    History of tanning No    Outdoor occupation No    Pt has a pacemaker No    Pt has a defibrillator No    Breast feeding No    Reaction to local anesthetic No        REVIEW OF SYSTEMS:  GENERAL: see HPI  SKIN: no rashes or abnormal skin lesions  HEENT: See HPI  LUNGS: denies shortness of breath or wheezing, See HPI  CARDIOVASCULAR: denies chest pain or palpitations   NEURO: + sinus headaches    EXAM:  General: Alert, Well-appearing, and In no acute distress  Respiratory:   Speaking in full sentences comfortably  Normal work of breathing  No cough during visit  Head: Normocephalic  Nose: No obvious nasal discharge.  Skin: No obvious rashes or lesions from what observed.     No results found for this or any previous visit (from the past 24 hours).    ASSESSMENT AND PLAN:  Alma Hackett is a 49 year old female who presents with symptoms that are consistent with    ASSESSMENT:   Encounter Diagnosis   Name Primary?    Upper respiratory tract infection, unspecified type Yes       PLAN: Likely viral etiology at this time with sx duration of 2 days. Has not tested for covid. Advised covid/flu testing. Pt agreeable. Discussed viral vs bacterial etiology of URIs, including cough and sinus congestion/pain. Patient was informed that antibiotics are not effective for treating viral ailments and can result in antibiotic resistence. Reviewed symptom relief measures with patient. Patient verbalized understanding of symptom relief measures, as well as s/sx of worsening that would need further evaluation.    See patient Instructions    There are no Patient Instructions on file for this visit.    The patient indicates understanding of these issues and agrees to the plan.  The patient is asked to f/u with PCP if sx's persist or to IC if s/s worsen.    Alma Hackett  understands video visit evaluation is not a substitute for face-to-face examination or emergency care. Patient advised to go to ER or call 911 for worsening symptoms or acute distress.

## 2025-04-04 ENCOUNTER — HOSPITAL ENCOUNTER (OUTPATIENT)
Dept: MRI IMAGING | Facility: HOSPITAL | Age: 50
Discharge: HOME | End: 2025-04-04
Attending: INTERNAL MEDICINE
Payer: COMMERCIAL

## 2025-04-04 DIAGNOSIS — C50.812 MALIGNANT NEOPLASM OF OVERLAPPING SITES OF LEFT BREAST IN FEMALE, ESTROGEN RECEPTOR POSITIVE (HCC): ICD-10-CM

## 2025-04-04 DIAGNOSIS — Z12.39 BREAST CANCER SCREENING OTHER THAN MAMMOGRAM: ICD-10-CM

## 2025-04-04 DIAGNOSIS — Z17.0 MALIGNANT NEOPLASM OF OVERLAPPING SITES OF LEFT BREAST IN FEMALE, ESTROGEN RECEPTOR POSITIVE (HCC): ICD-10-CM

## 2025-04-04 PROCEDURE — A9575 INJ GADOTERATE MEGLUMI 0.1ML: HCPCS | Performed by: INTERNAL MEDICINE

## 2025-04-04 PROCEDURE — 77049 MRI BREAST C-+ W/CAD BI: CPT | Performed by: INTERNAL MEDICINE

## 2025-04-04 RX ORDER — GADOTERATE MEGLUMINE 376.9 MG/ML
15 INJECTION INTRAVENOUS
Status: COMPLETED | OUTPATIENT
Start: 2025-04-04 | End: 2025-04-04

## 2025-04-04 RX ADMIN — GADOTERATE MEGLUMINE 14 ML: 376.9 INJECTION INTRAVENOUS at 09:46:00

## 2025-05-09 RX ORDER — LOSARTAN POTASSIUM 50 MG/1
50 TABLET ORAL DAILY
Qty: 90 TABLET | Refills: 1 | Status: SHIPPED | OUTPATIENT
Start: 2025-05-09

## 2025-06-24 RX ORDER — MONTELUKAST SODIUM 10 MG/1
10 TABLET ORAL NIGHTLY
Qty: 90 TABLET | Refills: 0 | Status: SHIPPED | OUTPATIENT
Start: 2025-06-24

## 2025-07-02 ENCOUNTER — OFFICE VISIT (OUTPATIENT)
Dept: DERMATOLOGY CLINIC | Facility: CLINIC | Age: 50
End: 2025-07-02
Payer: COMMERCIAL

## 2025-07-02 DIAGNOSIS — L40.9 PSORIASIS: ICD-10-CM

## 2025-07-02 DIAGNOSIS — Z51.81 MEDICATION MONITORING ENCOUNTER: ICD-10-CM

## 2025-07-02 DIAGNOSIS — L43.8 ORAL LICHEN PLANUS: ICD-10-CM

## 2025-07-02 DIAGNOSIS — D23.9 BENIGN NEOPLASM OF SKIN, UNSPECIFIED LOCATION: ICD-10-CM

## 2025-07-02 DIAGNOSIS — S70.11XS HEMATOMA OF RIGHT THIGH, SEQUELA: Primary | ICD-10-CM

## 2025-07-02 RX ORDER — TACROLIMUS 0.3 MG/G
OINTMENT TOPICAL
Qty: 30 G | Refills: 3 | Status: SHIPPED | OUTPATIENT
Start: 2025-07-02

## 2025-07-02 NOTE — PROGRESS NOTES
The following individual(s) verbally consented to be recorded using ambient AI listening technology and understand that they can each withdraw their consent to this listening technology at any point by asking the clinician to turn off or pause the recording:    Patient name: Alma Hackett

## 2025-07-07 NOTE — PROGRESS NOTES
Alma Hackett is a 49 year old female.  HPI:     CC:    Chief Complaint   Patient presents with    Lesion     LOV 6/2024. Pt present for assessment of lesion on R Inner thigh that she has had for about a year. Previously using clobetasol on it w/ no relief.          Allergies:  Patient has no known allergies.    HISTORY:    Past Medical History:    Allergic rhinitis    Anxiety    Breast cancer (HCC)    age 44    Conjunctival cyst    At 8 o'clock, OD    Corneal ulcer- right eye    Environmental allergies    Essential hypertension    Failure to thrive in pediatric patient    Headache    Hx of tonsillectomy    Hypertension    Internal hemorrhoids without complication    Lymphedema    Psoriasis      Past Surgical History:   Procedure Laterality Date    Colonoscopy N/A 09/11/2018    Procedure: COLONOSCOPY;  Surgeon: Ekta Villatoro MD;  Location: [facility] ENDO    Colonoscopy N/A 03/14/2024    Procedure: COLONOSCOPY;  Surgeon: Francesca Steven MD;  Location: [facility] ENDO    Excision turbinate,submucous  06/23/2016    Lumpectomy left Left 09/14/2020    John biopsy stereo nodule 1 site left (cpt=19081) Left 12/02/2022    CLIP PLACED X    Mouth surgery proc unlisted  12/05/2011    Jaw surgery/metal implant    Needle biopsy left Left     8/14/2020    Other surgical history  Osteomyelitis of the jaw    Radiation left Left     Removal of tonsils,10+ y/o  06/23/2016    Repair of nasal septum  06/23/2016    Tonsillectomy  2016      Family History   Problem Relation Age of Onset    Colon Cancer Father 65        Cause of death    Cancer Father     Stroke Mother         Cause of death    Hypertension Mother     Diabetes Maternal Grandmother     Hypertension Sister     Hypertension Brother     Breast Cancer Self 44    Glaucoma Neg     Macular degeneration Neg     Ovarian Cancer Neg       Social History     Socioeconomic History    Marital status:    Tobacco Use    Smoking status: Never     Passive exposure: Never     Smokeless tobacco: Never    Tobacco comments:     Grew up with a smoker, no present household smokers.    Substance and Sexual Activity    Alcohol use: No     Comment: rarely    Drug use: Yes     Comment: none    Sexual activity: Yes     Partners: Male   Other Topics Concern    Caffeine Concern Yes     Comment: 16 oz soda daily    Grew up on a farm No    History of tanning No    Outdoor occupation No    Pt has a pacemaker No    Pt has a defibrillator No    Breast feeding No    Reaction to local anesthetic No        Current Outpatient Medications   Medication Sig Dispense Refill    Tacrolimus (PROTOPIC) 0.03 % External Ointment Apply bid to right thigh/ psoriasis around ear/ scalp 30 g 3    montelukast 10 MG Oral Tab TAKE 1 TABLET NIGHTLY 90 tablet 0    losartan 50 MG Oral Tab Take 1 tablet (50 mg total) by mouth daily. 90 tablet 1    prochlorperazine (COMPAZINE) 10 mg tablet Take 1 tablet (10 mg total) by mouth daily as needed for Nausea. 90 tablet 1    Apremilast (OTEZLA) 30 MG Oral Tab Take 1 tablet by mouth 2 (two) times daily. 180 tablet 2    venlafaxine ER 75 MG Oral Capsule SR 24 Hr Take 1 capsule (75 mg total) by mouth daily. 90 capsule 3    tamoxifen 20 MG Oral Tab Take 1 tablet (20 mg total) by mouth daily. 90 tablet 3    Doxycycline Hyclate 20 MG Oral Tab Take 1 tablet (20 mg total) by mouth 2 (two) times daily. 180 tablet 2    azelastine 0.1 % Nasal Solution 2 sprays by Nasal route 2 (two) times daily. 3 each 2    levocetirizine 5 MG Oral Tab Take 1 tablet (5 mg total) by mouth every evening. 90 tablet 2    fluticasone propionate (FLONASE) 50 MCG/ACT Nasal Suspension 2 sprays by Nasal route daily. 3 each 2    triamcinolone 0.1 % External Ointment Applied 2 times per day as needed 60 g 0    venlafaxine ER 37.5 MG Oral Capsule SR 24 Hr Take 1 capsule (37.5 mg total) by mouth daily. 90 capsule 3    halobetasol 0.05 % External Cream Apply bid to affected area for 10 to 14 days 100 g 0    Diclofenac Sodium 1 %  External Gel Apply 2 g topically 4 (four) times daily. Apply thin layer to affected joint. 100 g 3    Cholecalciferol (VITAMIN D3) 25 MCG (1000 UT) Oral Cap Take 1 tablet by mouth daily.      Clobetasol Propionate 0.05 % External Foam Use bid 100 g 6    Fluocinolone Acetonide (DERMOTIC) 0.01 % Otic Oil Use qhs to ears for psoriasis 20 mL 6    Emollient (ELETONE) Apply Externally Cream Use bid 200 g 6    calcipotriene 0.005 % External Cream Apply topically. apply by TOPICAL route 2 times every day a thin film to the affected skin areasand rub in gently and completely       Allergies:   No Known Allergies    Past Medical History:    Allergic rhinitis    Anxiety    Breast cancer (HCC)    age 44    Conjunctival cyst    At 8 o'clock, OD    Corneal ulcer- right eye    Environmental allergies    Essential hypertension    Failure to thrive in pediatric patient    Headache    Hx of tonsillectomy    Hypertension    Internal hemorrhoids without complication    Lymphedema    Psoriasis     Past Surgical History:   Procedure Laterality Date    Colonoscopy N/A 09/11/2018    Procedure: COLONOSCOPY;  Surgeon: Ekta Villatoro MD;  Location: [facility] ENDO    Colonoscopy N/A 03/14/2024    Procedure: COLONOSCOPY;  Surgeon: Francesca Steven MD;  Location: [facility] ENDO    Excision turbinate,submucous  06/23/2016    Lumpectomy left Left 09/14/2020    John biopsy stereo nodule 1 site left (cpt=19081) Left 12/02/2022    CLIP PLACED X    Mouth surgery proc unlisted  12/05/2011    Jaw surgery/metal implant    Needle biopsy left Left     8/14/2020    Other surgical history  Osteomyelitis of the jaw    Radiation left Left     Removal of tonsils,10+ y/o  06/23/2016    Repair of nasal septum  06/23/2016    Tonsillectomy  2016     Social History     Socioeconomic History    Marital status:      Spouse name: Not on file    Number of children: Not on file    Years of education: Not on file    Highest education level: Not on file    Occupational History    Not on file   Tobacco Use    Smoking status: Never     Passive exposure: Never    Smokeless tobacco: Never    Tobacco comments:     Grew up with a smoker, no present household smokers.    Vaping Use    Vaping status: Not on file   Substance and Sexual Activity    Alcohol use: No     Comment: rarely    Drug use: Yes     Comment: none    Sexual activity: Yes     Partners: Male   Other Topics Concern     Service Not Asked    Blood Transfusions Not Asked    Caffeine Concern Yes     Comment: 16 oz soda daily    Occupational Exposure Not Asked    Hobby Hazards Not Asked    Sleep Concern Not Asked    Stress Concern Not Asked    Weight Concern Not Asked    Special Diet Not Asked    Back Care Not Asked    Exercise Not Asked    Bike Helmet Not Asked    Seat Belt Not Asked    Self-Exams Not Asked    Grew up on a farm No    History of tanning No    Outdoor occupation No    Pt has a pacemaker No    Pt has a defibrillator No    Breast feeding No    Reaction to local anesthetic No    Left Handed Not Asked    Right Handed Not Asked    Currently spends a great deal of time in the sun Not Asked    Past Sunlamp Treatments for Acne Not Asked    Hx of Spending Great Deal of Time in Sun Not Asked    Bad sunburns in the past Not Asked    Tanning Salons in the Past Not Asked    Hx of Radiation Treatments Not Asked    Regular use of sun block Not Asked   Social History Narrative    Not on file     Social Drivers of Health     Food Insecurity: Not on file   Transportation Needs: Not on file   Stress: Not on file   Housing Stability: Not on file     Family History   Problem Relation Age of Onset    Colon Cancer Father 65        Cause of death    Cancer Father     Stroke Mother         Cause of death    Hypertension Mother     Diabetes Maternal Grandmother     Hypertension Sister     Hypertension Brother     Breast Cancer Self 44    Glaucoma Neg     Macular degeneration Neg     Ovarian Cancer Neg        There  were no vitals filed for this visit.    HPI:    Chief Complaint   Patient presents with    Lesion     LOV 6/2024. Pt present for assessment of lesion on R Inner thigh that she has had for about a year. Previously using clobetasol on it w/ no relief.      Follow-up psoriasis doing well.  Otezla helping.  Notes lesion on thigh after blood pressure cuff had bruising seems to be fading still firm    Pt with history of breast cancer on tamoxifen post surgery radiation    Doing well on Otezla essentially clear.  Generally clear on twice daily dosing has had minimal outbreaks.  Scalp especially doing well.  Nails still a concern.   No significant problems with the Otezla.  Psoriasis occasionally uses topicals.  Overall good control with Otezla no significant side effects  No significant arthritis.  Oral erosive gingivitis /LP triggered by Humira in the past, ok currently.  History of atypical nevus abdomen    History of Present Illness  Alma Hackett is a 49 year old female with psoriasis who presents for dermatological evaluation of a persistent leg lesion.    She has a persistent lesion on her leg, located where the blood pressure cuff is typically placed. The lesion has been present for over a year, is not itchy, but can be irritated by clothing. She has previously used a steroid cream on the lesion but has not applied it recently. The lesion has changed from a purple to a more pink color over time.    She is currently using Otezla for psoriasis, taking it twice a day, and it has been effective. She does not use any other treatments for her psoriasis at this time. Her scalp psoriasis has improved significantly compared to the past.    She notes the presence of skin tags and other skin changes, which she attributes to aging. She describes a skin tag that bled due to irritation from a compression bra        Biopsy benign keratosis right canthus lateral previously    Past notes/ records and appropriate/relevant  lab results including pathology and past body maps reviewed. Updated and new information noted in current visit.       Patient presents with concerns above.    Patient has been in their usual state of health.  History, medications, allergies reviewed as noted.      ROS:  Denies any other systemic complaints.  No new or changeing lesions other than noted above. No fevers, chills, night sweats, unusual sun sensitivity.  No other skin complaints.        History, medications, allergies reviewed as noted.       Physical Examination:     Well-developed well-nourished patient alert oriented in no acute distress.  Exam performed, including scalp, head, neck, face,nails, hair, external eyes, including conjunctival mucosa, eyelids, lips external ears, chest, arms, legs, palms nails.     Multiple light to medium brown, well marginated, uniformly pigmented, macules and papules 6 mm and less scattered on exam. pigmented lesions examined with dermoscopy benign-appearing patterns.     Waxy tannish keratotic papules scattered, cherry-red vascular papules scattered.    See map today's date for lesions noted .      Otherwise remarkable for lesions as noted on map.  See details of examination  See Assessment /Plan for additional history and physical exam also:    Assessment / plan:    No orders of the defined types were placed in this encounter.      Meds & Refills for this Visit:  Requested Prescriptions     Signed Prescriptions Disp Refills    Tacrolimus (PROTOPIC) 0.03 % External Ointment 30 g 3     Sig: Apply bid to right thigh/ psoriasis around ear/ scalp         Encounter Diagnoses   Name Primary?    Hematoma of right thigh, sequela Yes    Psoriasis     Oral lichen planus     Medication monitoring encounter     Benign neoplasm of skin, unspecified location        See details on map.      Remarkable for:      Physical Exam        Results        Assessment & Plan  Psoriasis  Psoriasis is well-managed with Otezla, taken twice  daily, with no significant flare-ups. No additional topical treatments are used. Discussion included potential use of newer medications like Xerava and Radium, but these are redundant. Protopic was discussed for inflammation, particularly on the scalp, but is not currently used. Otezla has a favorable safety profile with no significant side effects or increased risk of clots or cancer. Insurance coverage for Otezla is adequate.  - Continue Otezla twice daily.  - Consider Protopic for inflammation if needed, particularly for the scalp.  - Monitor for any worsening of symptoms, especially on the scalp, which may require additional treatment.      Skin lesion on leg  Resolving hematoma right medial thigh post blood pressure cuff from her colonoscopy tender at times will try Protopic for more postinflammatory changes reassurance.  No more worrisome changes.  Patient concern regarding discoloration in this area  Recommend continuing to massage the area    The skin lesion on the leg has been present for over a year, is not itchy but can be irritated by clothing. It is less purple and more pink, indicating improvement. Previous use of steroid cream was discontinued. The lesion may be a deeper bruise, which can take a couple of years to resolve. Protopic was discussed for inflammation and texture issues. Massage may aid in improvement.  - Discontinue steroid cream.  - Consider using Protopic for inflammation and texture improvement.  - Massage the area to aid in resolution.    Recording duration: 9 minutes      Biopsy visit right lateral canthus seborrheic keratosis    Continue to observe atypical nevus site on abdomen.  Continue observation atypical nevus    Few plaques on elbows minimal hands, minimal scalp.  Occasional flareups uses topicals intermittently Derma-Smoothe works well nails with ridges and pits, nailfolds normal.  Erythema, patchy scalp over the forehead brows and medial cheeks consistent with psoriasis. New  concern since decreasing Otezla  Ears, have been ok now  Psoriasis.  Plaque-type.  Previously~10% body surface involvement.  Including scalp.  Elbows knees more persistent scattered lesions on trunk and extremities.  Stable doing well nearly clear on Otezla topicals as needed will let us know she is a refill overall stable few patches over trunk and extremities.  Scalp been pretty clear.  Uses topicals quite sporadically overall happy with control.  Plan to continue no problems tolerating the Otezla.    Previously  dermasmoothe prn,  Add elidel for facial involvment.    Continue calcipotriene cream clobetasol foam, fluocinolone otic oil as needed has really not needed these with consistent Otezla use    Arthritis doing well.  Nails clear presently.  And will treat with medications and grid.  Overall skincare, liberal use of emollients discussed.  Consider more aggressive therapy if worsening.Patient will let us know how they are doing over the next several weeks.  Await clinical response to above therapy.     Patient with gingivitis, oral lichen planus post Humira previously.   Does not want to consider injectables at this time concern with breast cancer and injectables as well  Overall happy with control on Otezla  Discussed possible Alejandro inhibitors if loss of control of her psoriasis or possibly for worsening arthritis  With recent breast cancer, alternative biologics and immunosuppressants contraindicated.  UVB contraindicated -pt at risk for additional atypical pigmented leisons/ NMSC with fair skin.    Continue carful monitoring  Nevi stable no new atypical lesions re-check at f/u.  Overall benign lesions  Unchanged papule mid back observe.    Psoriasis elbows scalp.  Minimal    Benign-appearing nevus on back observe.  Multiple nevi lentigines unchanged no other new suspicious lesions extensive pigmented lesions prior atypical lesions more consistent follow-up especially with recent diagnosis of breast cancer  follow-up every 6 months for skin exam    Labs reviewed normal.  Humira.  Has been okay flares intermittently has  Patient with oral lichen planus/post contemplated having implants.  Concern with side effects.  Will continue Otezla, will add doxycycline 20 mg twice daily.  Follow-up with oral medicine at [place] as well    See previous body maps  Please refer to map for specific lesions.  See additional diagnoses.  Pros cons of various therapies, risks benefits discussed.Pathophysiology discussed with patient.  Therapeutic options reviewed.  See  Medications in grid.  Instructions reviewed at length.    Benign nevi, seborrheic  keratoses, cherry angiomas:  Reassurance regarding other benign skin lesions.Signs and symptoms of skin cancer, ABCDE's of melanoma discussed with patient. Sunscreen use, sun protection, self exams reviewed.  Followup as noted RTC routine checkup 6 mos - one year or p.r.n.    The patient indicates understanding of these issues and agrees to the plan.  The patient is asked to return as noted in follow-up/ above.    This note was generated using Dragon voice recognition software.  Please contact me regarding any confusion resulting from errors in recognition.  Encounter Times   Encounter Times   Including precharting, reviewing chart, prior notes obtaining history, medical exam, notes on body map, plan, counseling, discussion of therapeutic options, patient education, orders more than half total time spent in face to face time with patient.  My total time spent caring for the patient on the day of the encounter: 45 minutes

## 2025-07-08 ENCOUNTER — OFFICE VISIT (OUTPATIENT)
Age: 50
End: 2025-07-08
Attending: GENETIC COUNSELOR, MS
Payer: COMMERCIAL

## 2025-07-08 VITALS
BODY MASS INDEX: 28.46 KG/M2 | HEART RATE: 82 BPM | HEIGHT: 62 IN | WEIGHT: 154.63 LBS | SYSTOLIC BLOOD PRESSURE: 123 MMHG | RESPIRATION RATE: 16 BRPM | TEMPERATURE: 100 F | DIASTOLIC BLOOD PRESSURE: 82 MMHG | OXYGEN SATURATION: 97 %

## 2025-07-08 DIAGNOSIS — Z17.0 MALIGNANT NEOPLASM OF OVERLAPPING SITES OF LEFT BREAST IN FEMALE, ESTROGEN RECEPTOR POSITIVE (HCC): Primary | ICD-10-CM

## 2025-07-08 DIAGNOSIS — Z08 ENCOUNTER FOR FOLLOW-UP SURVEILLANCE OF BREAST CANCER: ICD-10-CM

## 2025-07-08 DIAGNOSIS — Z12.31 SCREENING MAMMOGRAM, ENCOUNTER FOR: ICD-10-CM

## 2025-07-08 DIAGNOSIS — Z12.39 BREAST CANCER SCREENING OTHER THAN MAMMOGRAM: ICD-10-CM

## 2025-07-08 DIAGNOSIS — Z51.81 ENCOUNTER FOR MONITORING TAMOXIFEN THERAPY: ICD-10-CM

## 2025-07-08 DIAGNOSIS — C50.812 MALIGNANT NEOPLASM OF OVERLAPPING SITES OF LEFT BREAST IN FEMALE, ESTROGEN RECEPTOR POSITIVE (HCC): Primary | ICD-10-CM

## 2025-07-08 DIAGNOSIS — Z85.3 ENCOUNTER FOR FOLLOW-UP SURVEILLANCE OF BREAST CANCER: ICD-10-CM

## 2025-07-08 DIAGNOSIS — Z79.810 ENCOUNTER FOR MONITORING TAMOXIFEN THERAPY: ICD-10-CM

## 2025-07-08 RX ORDER — PROCHLORPERAZINE MALEATE 10 MG
10 TABLET ORAL DAILY PRN
Qty: 90 TABLET | Refills: 1 | Status: SHIPPED | OUTPATIENT
Start: 2025-07-08

## 2025-07-08 NOTE — PROGRESS NOTES
HPI     Alma Hackett is a 49 year old female who is here today for follow-up diagnosis of   Encounter Diagnoses   Name Primary?    Malignant neoplasm of overlapping sites of left breast in female, estrogen receptor positive (HCC) Yes    Encounter for monitoring tamoxifen therapy     Encounter for follow-up surveillance of breast cancer     Screening mammogram, encounter for     Breast cancer screening other than mammogram       Compliance with SBE.      States a month ago had a pimple/boil under the right breast for 2-3 days, states that she squeezed it and a lot of whitish thick material came out and then some serous bloody drainage.  Pain relieved after that.     Compliant with tamoxifen.  Now has hot flashes in the am getting ready for work.  Still on venlafaxine.  Some nausea after taking tamoxifen, she takes in am after breakfast and has nausea for about 3 hrs.  Has not taken the compazine as did not received it.      Appetite good, weight is stable.     Still working full time.     Wearing compression sleeve and compression bra. HEP at home at times. Good ROM and no pain.  Using compression pump once a week.     LMP 6/19/25  States still regular.        ECOG PS 0         Review of Systems:   Review of Systems   Constitutional:  Negative for appetite change, fatigue and unexpected weight change.   Respiratory:  Negative for cough and shortness of breath.    Cardiovascular:  Negative for chest pain, leg swelling and palpitations.   Gastrointestinal:  Negative for abdominal pain.   Endocrine: Positive for hot flashes.   Genitourinary:  Negative for menstrual problem.    Musculoskeletal:  Positive for arthralgias (hands. R > L). Negative for back pain, myalgias and neck pain.        No bone pain.    Skin:         Hair thinning.   Neurological:  Positive for headaches (with period.). Negative for dizziness.   Hematological:  Negative for adenopathy. Bruises/bleeds easily.   Psychiatric/Behavioral:   Positive for sleep disturbance (due to 3 new puppies). The patient is not nervous/anxious.          Current Outpatient Medications   Medication Sig Dispense Refill    Tacrolimus (PROTOPIC) 0.03 % External Ointment Apply bid to right thigh/ psoriasis around ear/ scalp 30 g 3    montelukast 10 MG Oral Tab TAKE 1 TABLET NIGHTLY 90 tablet 0    losartan 50 MG Oral Tab Take 1 tablet (50 mg total) by mouth daily. 90 tablet 1    prochlorperazine (COMPAZINE) 10 mg tablet Take 1 tablet (10 mg total) by mouth daily as needed for Nausea. 90 tablet 1    Apremilast (OTEZLA) 30 MG Oral Tab Take 1 tablet by mouth 2 (two) times daily. 180 tablet 2    venlafaxine ER 75 MG Oral Capsule SR 24 Hr Take 1 capsule (75 mg total) by mouth daily. 90 capsule 3    tamoxifen 20 MG Oral Tab Take 1 tablet (20 mg total) by mouth daily. 90 tablet 3    Doxycycline Hyclate 20 MG Oral Tab Take 1 tablet (20 mg total) by mouth 2 (two) times daily. 180 tablet 2    azelastine 0.1 % Nasal Solution 2 sprays by Nasal route 2 (two) times daily. 3 each 2    levocetirizine 5 MG Oral Tab Take 1 tablet (5 mg total) by mouth every evening. 90 tablet 2    fluticasone propionate (FLONASE) 50 MCG/ACT Nasal Suspension 2 sprays by Nasal route daily. 3 each 2    triamcinolone 0.1 % External Ointment Applied 2 times per day as needed 60 g 0    venlafaxine ER 37.5 MG Oral Capsule SR 24 Hr Take 1 capsule (37.5 mg total) by mouth daily. 90 capsule 3    halobetasol 0.05 % External Cream Apply bid to affected area for 10 to 14 days 100 g 0    Diclofenac Sodium 1 % External Gel Apply 2 g topically 4 (four) times daily. Apply thin layer to affected joint. 100 g 3    Cholecalciferol (VITAMIN D3) 25 MCG (1000 UT) Oral Cap Take 1 tablet by mouth daily.      Clobetasol Propionate 0.05 % External Foam Use bid 100 g 6    Fluocinolone Acetonide (DERMOTIC) 0.01 % Otic Oil Use qhs to ears for psoriasis 20 mL 6    Emollient (ELETONE) Apply Externally Cream Use bid 200 g 6    calcipotriene  0.005 % External Cream Apply topically. apply by TOPICAL route 2 times every day a thin film to the affected skin areasand rub in gently and completely       Allergies:   No Known Allergies    Past Medical History:    Allergic rhinitis    Anxiety    Breast cancer (HCC)    age 44    Conjunctival cyst    At 8 o'clock, OD    Corneal ulcer- right eye    Environmental allergies    Essential hypertension    Failure to thrive in pediatric patient    Headache    Hx of tonsillectomy    Hypertension    Internal hemorrhoids without complication    Lymphedema    Psoriasis     Past Surgical History:   Procedure Laterality Date    Colonoscopy N/A 09/11/2018    Procedure: COLONOSCOPY;  Surgeon: Ekta Villatoro MD;  Location: [facility]    Colonoscopy N/A 03/14/2024    Procedure: COLONOSCOPY;  Surgeon: Francesca Steven MD;  Location: [facility]    Excision turbinate,submucous  06/23/2016    Lumpectomy left Left 09/14/2020    John biopsy stereo nodule 1 site left (cpt=19081) Left 12/02/2022    CLIP PLACED X    Mouth surgery proc unlisted  12/05/2011    Jaw surgery/metal implant    Needle biopsy left Left     8/14/2020    Other surgical history  Osteomyelitis of the jaw    Radiation left Left     Removal of tonsils,10+ y/o  06/23/2016    Repair of nasal septum  06/23/2016    Tonsillectomy  2016     Social History     Socioeconomic History    Marital status:    Tobacco Use    Smoking status: Never     Passive exposure: Never    Smokeless tobacco: Never    Tobacco comments:     Grew up with a smoker, no present household smokers.    Substance and Sexual Activity    Alcohol use: No     Comment: rarely    Drug use: Yes     Comment: none    Sexual activity: Yes     Partners: Male   Other Topics Concern    Caffeine Concern Yes     Comment: 16 oz soda daily    Grew up on a farm No    History of tanning No    Outdoor occupation No    Pt has a pacemaker No    Pt has a defibrillator No    Breast feeding No    Reaction to local  anesthetic No       Family History   Problem Relation Age of Onset    Colon Cancer Father 65        Cause of death    Cancer Father     Stroke Mother         Cause of death    Hypertension Mother     Diabetes Maternal Grandmother     Hypertension Sister     Hypertension Brother     Breast Cancer Self 44    Glaucoma Neg     Macular degeneration Neg     Ovarian Cancer Neg          PHYSICAL EXAM:    /82 (BP Location: Right arm, Patient Position: Sitting, Cuff Size: adult)   Pulse 82   Temp 99.5 °F (37.5 °C) (Tympanic)   Resp 16   Ht 1.575 m (5' 2\")   Wt 70.1 kg (154 lb 9.6 oz)   LMP 10/03/2024 (Approximate)   SpO2 97%   BMI 28.28 kg/m²   Wt Readings from Last 6 Encounters:   07/08/25 70.1 kg (154 lb 9.6 oz)   01/08/25 71.1 kg (156 lb 12.8 oz)   10/23/24 70.8 kg (156 lb)   07/10/24 70.4 kg (155 lb 3.2 oz)   06/04/24 69.3 kg (152 lb 12.8 oz)   05/15/24 69.4 kg (153 lb)     Physical Exam  General: Patient is alert, not in acute distress.  HEENT: EOMs intact. PERRL.  Neck: No JVD. No palpable lymphadenopathy. Neck is supple.  Chest: Clear to auscultation.  Breasts:  R breast no mass, L breast with surgical scars at breast and axilla and RT skin changes and lymphedema well controlled, no mass.  Heart: Regular rate and rhythm.   Abdomen: Soft, non tender with good bowel sounds.    Extremities: No edema.  Neurological: Grossly intact.   Lymphatics: There is no palpable lymphadenopathy throughout in the cervical, supraclavicular, axillary, or inguinal regions.  Psych/Depression: nl        ASSESSMENT/PLAN:     Encounter Diagnoses   Name Primary?    Malignant neoplasm of overlapping sites of left breast in female, estrogen receptor positive (HCC) Yes    Encounter for monitoring tamoxifen therapy     Encounter for follow-up surveillance of breast cancer     Screening mammogram, encounter for     Breast cancer screening other than mammogram         Cancer Staging   Malignant neoplasm of overlapping sites of left  breast in female, estrogen receptor positive (HCC)  Staging form: Breast, AJCC 8th Edition  - Pathologic stage from 9/18/2020: Stage IA (pT1b, pN0(sn), cM0, G2, ER+, IL+, HER2-, Oncotype DX score: 20) - Signed by Lonnie Powell MD on 9/29/2020    Alma Hackett is a 49 year old female with ER/IL positive breast cancer, node negative.    Patient completed local control of treatment with lumpectomy and radiation therapy.    Oncotype score of 20, which per data from TAILORX trial the risk of distant recurrence at 9 years with 5 years of adjuvant hormonal therapy is 6% in the group average absolute benefit from chemotherapy is less than 1%.  Discussed with the patient that when stratified by age patient's less than or equal to 50 years old the benefit from chemotherapy and is approximately 1.6%.  Discussed with the patient the potential side effects of chemotherapy, and also discussed with the patient that there is option for 10 years of adjuvant hormonal therapy to improve outcomes.     Given the patient's low risk or recurrence, and marginal benefit from chemotherapy, do not recommend adjuvant chemotherapy.  She is on adjuvant hormonal therapy alone.    Patient is premenopausal.      Taking tamoxifen daily and is tolerating well.  She will complete treatment with tamoxifen on November of 2025. Discussed that she is supposed to take the AI at the same time every day and that the therapy will be for 5-10 yrs total.  BCI with recommendation for total of 10 yrs.     Bilateral mammogram 11/22/2024 BI-RADS 2, due again in 1 year.  Order given    MRI of the breasts on 4/04/2025 BIRADS-2, next due in April of 2026.    Discussed with the patient that when she becomes postmenopausal, once this is confirmed, will then switch to an aromatase inhibitor to complete her adjuvant therapy course.      All questions answered to the best of my abilities.  Support provided to the patient.      MDM - moderate.  I have a  longitudinal and continuous care relationship with this patient for the management of breast cancer, a serious or complex condition.  is applicable because the patient's medical record notes over time support that there is a longitudinal care relationship with me, the care plan reflects the ongoing nature of the continuous relationship of care, and the medical record indicates that there is ongoing treatment of a serious/complex medical condition which I am currently managing.     Quality measures:  Hypertension target range 130-140/70-80  Tobacco:   Never smoker.        No follow-ups on file.  No orders of the defined types were placed in this encounter.    Results From Past 48 Hours:  No results found for this or any previous visit (from the past 48 hours).      Imaging & Referrals:  None     PROCEDURE: MRI BREAST (W+WO) W/CAD BILAT (CPT=77049)     COMPARISON: [facility], MRI BREAST (W+WO) W/CAD BILAT (CPT=77049), 4/12/2024, 10:59 AM.     INDICATIONS: Z12.39 Breast cancer screening other than mammogram Z17.0 Malignant neoplasm of overlapping sites of left breast in female, estrogen receptor positive (HCC) C5*     BREAST COMPOSITION: Category B - The breasts have scattered areas fibroglandular density.     TECHNIQUE: Interpretation of this MRI was correlated with available mammograms, ultrasound and clinical information. MRI images were obtained using a dedicated breast coil in a 1.5 Ruth scanner. Initial axial T1 weight GRE imaging both before and after  IV administration of 14 ml of Dotarem. Subsequently, subtraction imaging and 3D reconstruction were completed on an independent workstation.     ENHANCEMENT PATTERN:   None or minimal, with <25% of glandular tissue demonstrating enhancement.    FINDINGS:     LEFT BREAST: There is no suspicious mass, abnormal enhancement or significant abnormality.     RIGHT BREAST:  There is a stable focus of enhancement in the upper inner quadrant of the  right breast, best seen on series 70267, image 81. This is unchanged since 2020, consistent with benignity.  There is no other suspicious mass, abnormal enhancement  or significant abnormality.     AXILLAE: No abnormalities are seen in either axillary region.               Impression   CONCLUSION:  No new abnormal enhancement is seen in either breast.  Annual screening breast MRI in conjunction with screening mammography at alternating six month intervals in recommended due to patient's high risk status.     BI-RADS CATEGORY:  DIAGNOSTIC CATEGORY 2 - BENIGN FINDING:       RECOMMENDATIONS:  ROUTINE MAMMOGRAM AND CLINICAL EVALUATION IN 6 MONTHS.        THE AMERICAN CANCER SOCIETY NOW RECOMMENDS THAT ALL WOMEN WITH >20% LIFETIME RISK OF BREAST CANCER UNDERGO ANNUAL SCREENING BREAST MRI AND WOMEN WITH 15-20% SPEAK WITH THEIR DOCTORS ABOUT ANNUAL SCREENING BREAST MRI. PLEASE NOTE: A NORMAL MRI DOES NOT  EXCLUDE THE POSSIBILITY OF BREAST CANCER.  A CLINICALLY SUSPICIOUS PALPABLE LUMP SHOULD BE BIOPSIED.                         Dictated by (CST): Vesna Basilio DO on 4/06/2025 at 7:31 AM      Finalized by (CST): Vesna Basilio DO on 4/06/2025 at 7:44 AM

## 2025-07-21 ENCOUNTER — TELEPHONE (OUTPATIENT)
Facility: LOCATION | Age: 50
End: 2025-07-21

## 2025-07-21 NOTE — TELEPHONE ENCOUNTER
Family Medical Leave Act forms rec'd. Valid authorization on file 1/20/25 for [facility]. Logged for processing.

## 2025-07-21 NOTE — TELEPHONE ENCOUNTER
Dr. Powell,    Please sign off on form if you agree to: Intermittent Family Medical Leave Act due to cancer treatments/symptoms, 1-2 flare ups/mo lasting 1 day and 1-5 appts/mo lasting 1-4 hours    -Signature page will be the first page scanned  -From your Inbasket, Highlight the patient and click Chart   -Double click the 7/21/25 Forms Completion telephone encounter  -Scroll down to the Media section   -Click the blue Hyperlink: Family Medical Leave Act, Dr. Powell, 7/21/25  -Click Acknowledge located in the top right ribbon/menu   -Drag the mouse into the blank space of the document and a + sign will appear. Left click to   electronically sign the document.  -Once signed, simply exit out of the screen and you signature will be saved.     Thank you,  Tierney HAMMOND

## (undated) DEVICE — 60 ML SYRINGE REGULAR TIP: Brand: MONOJECT

## (undated) DEVICE — DERMABOND LIQUID ADHESIVE

## (undated) DEVICE — KIT ENDO ORCAPOD 160/180/190

## (undated) DEVICE — STERILE LATEX POWDER-FREE SURGICAL GLOVESWITH NITRILE COATING: Brand: PROTEXIS

## (undated) DEVICE — FLEXIBLE YANKAUER,MEDIUM TIP, NO VACUUM CONTROL: Brand: ARGYLE

## (undated) DEVICE — ENDOSCOPY PACK - LOWER: Brand: MEDLINE INDUSTRIES, INC.

## (undated) DEVICE — MINOR GENERAL: Brand: MEDLINE INDUSTRIES, INC.

## (undated) DEVICE — SOL  .9 1000ML BTL

## (undated) DEVICE — SUTURE VICRYL 3-0 SH

## (undated) DEVICE — SUTURE SILK 2-0 SH

## (undated) DEVICE — DRAPE SHEET TRANSVERSE LAP

## (undated) DEVICE — BRA SURG ELIZ PINK M

## (undated) DEVICE — KIT CLEAN ENDOKIT 1.1OZ GOWNX2

## (undated) DEVICE — Device: Brand: DEFENDO AIR/WATER/SUCTION AND BIOPSY VALVE

## (undated) DEVICE — DRAPE SHEET LG

## (undated) DEVICE — ACCUVAC SMOKE ATTACHMENT

## (undated) DEVICE — MEDI-VAC NON-CONDUCTIVE SUCTION TUBING 6MM X 1.8M (6FT.) L: Brand: CARDINAL HEALTH

## (undated) DEVICE — SUTURE MONOCRYL 4-0 PS-2

## (undated) DEVICE — Device: Brand: DUAL NARE NASAL CANNULAE FEMALE LUER CON 7FT O2 TUBE

## (undated) DEVICE — COVER PRB NEOGUARD 30X2.6CM US

## (undated) NOTE — MR AVS SNAPSHOT
[address]  [phone]               Thank you for choosing us for your health care visit with Shubham Harris MD.  We are glad to serve you and happy to provide you with this summar office. At that time, you will be provided with any authorization numbers or be assured that none are required. You can then schedule your appointment. Failure to obtain required authorization numbers can create reimbursement difficulties for you.     Is th · Do not have pets with fur and feathers. · If you cannot avoid having a pet, keep it out of your bedroom and off upholstered furniture. Pollen:  · When pollen counts are high, keep windows of your car and home closed.  If possible, use an air conditioner No Known Allergies                Today's Vital Signs     BP Temp Height Weight BMI    112/80 mmHg 98.1 °F (36.7 °C) (Tympanic) 5' 3\" (1.6 m) 150 lb (68.04 kg) 26.58 kg/m2         Current Medications          This list is accurate as of: 6/13/17 10:46 PM - Levocetirizine Dihydrochloride 5 MG Tabs  - Triamcinolone Acetonide 55 MCG/ACT Aero            MyChart     Visit MyChart  You can access your MyChart to more actively manage your health care and view more details from this visit by going to [email or web address]

## (undated) NOTE — LETTER
[address] [place] [address]      Authorization for Surgical Operation and Procedure     Date:___________                                                                                                         Time:_______ and/or blood products. The following are some, but not all, of the potential risks that can occur: fever and allergic reactions, hemolytic reactions, transmission of diseases such as Hepatitis, AIDS and Cytomegalovirus (CMV) and fluid overload.   In the ev (or a person authorized to consent on my behalf). The surgeon or my attending physician will determine when the applicable recovery period ends for purposes of reinstating the DNAR order.   10. Patients having a sterilization procedure: I understand that if procedure/surgery, I have disclosed this and had a discussion with my patient.     _______________________________________________________________ _____________________________  Miranda Vargas  Physician)

## (undated) NOTE — LETTER
7/21/2017              Michael Agustin        [facility] [address]         Dear Cleveland Hernandez,      The report of the Biopsy done on 7-17-17 shows a Compound Nevus.   This is a benign (not cancerous) growth, and requires no further treatment

## (undated) NOTE — LETTER
[facility]  Administration of Anesthesia  1. Carmen Silence, or _________________________________ acting on her behalf, (Patient) (Dependent/Representative) request to receive anesthesia for my pending procedure/operation/treatment. infections, high spinal block, spinal bleeding, seizure, cardiac arrest and death. 7. AWARENESS: I understand that it is possible (but unlikely) to have explicit memory of events from the operating room while under general anesthesia.   8. ELECTROCONVULSIV unconscious pt /Relationship    My signature below affirms that prior to the time of the procedure, I have explained to the patient and/or his/her guardian, the risks and benefits of undergoing anesthesia, as well as any reasonable alternatives.     _______

## (undated) NOTE — LETTER
[facility]  Administration of Anesthesia  IAlma agree to be cared for by a physician anesthesiologist alone and/or with a nurse anesthetist, who is specially trained to monitor me and give me medicine to put me to sleep or keep me comfortable during my procedure    I understand that my anesthesiologist and/or anesthetist is not an employee or agent of [facility] or [employer]. He or she works for [facility]    As the patient asking for anesthesia services, I agree to:  Allow the anesthesiologist (anesthesia doctor) to give me medicine and do additional procedures as necessary. Some examples are: Starting or using an “IV” to give me medicine, fluids or blood during my procedure, and having a breathing tube placed to help me breathe when I’m asleep (intubation). In the event that my heart stops working properly, I understand that my anesthesiologist will make every effort to sustain my life, unless otherwise directed by [facility] Do Not Resuscitate documents.  Tell my anesthesia doctor before my procedure:  If I am pregnant.  The last time that I ate or drank.  iii. All of the medicines I take (including prescriptions, herbal supplements, and pills I can buy without a prescription (including street drugs/illegal medications). Failure to inform my anesthesiologist about these medicines may increase my risk of anesthetic complications.  iv.If I am allergic to anything or have had a reaction to anesthesia before.  I understand how the anesthesia medicine will help me (benefits).  I understand that with any type of anesthesia medicine there are risks:  The most common risks are: nausea, vomiting, sore throat, muscle soreness, damage to my eyes, mouth, or teeth (from breathing tube placement).  Rare risks include: remembering what happened during my procedure, allergic reactions to medications, injury to my airway, heart, lungs, vision, nerves,  or muscles and in extremely rare instances death.  My doctor has explained to me other choices available to me for my care (alternatives).  Pregnant Patients (“epidural”):  I understand that the risks of having an epidural (medicine given into my back to help control pain during labor), include itching, low blood pressure, difficulty urinating, headache or slowing of the baby’s heart. Very rare risks include infection, bleeding, seizure, irregular heart rhythms and nerve injury.  Regional Anesthesia (“spinal”, “epidural”, & “nerve blocks”):  I understand that rare but potential complications include headache, bleeding, infection, seizure, irregular heart rhythms, and nerve injury.    _____________________________________________________________________________  Patient (or Representative) Signature/Relationship to Patient  Date   Time    _____________________________________________________________________________   Name (if used)    Language/Organization   Time    _____________________________________________________________________________  Nurse Anesthetist Signature     Date   Time  _____________________________________________________________________________  Anesthesiologist Signature     Date   Time  I have discussed the procedure and information above with the patient (or patient’s representative) and answered their questions. The patient or their representative has agreed to have anesthesia services.    _____________________________________________________________________________  Witness        Date   Time  I have verified that the signature is that of the patient or patient’s representative, and that it was signed before the procedure  Patient Name: Alma Hackett     : 10/14/1975                 Printed: 3/13/2024 at 8:36 AM    Medical Record #: [id number]                                            Page 1 of 1  ----------ANESTHESIA CONSENT----------

## (undated) NOTE — LETTER
[address]  [address] Le[address]  [facility] [address]  [phone]  Authorization for Imaging Procedure  Date of Procedure:     1. I hereby authorize Dr. Rico Monterroso , my physician and his/her assistants (if applicable), which may include medical students, residents, and/or fellows, to perform the following procedure and administer such anesthesia as may be determined necessary by my physician: Stereotactic biopsy with tomosynthesis of the left breast with clip placement on [place]. 2.  I recognize that during the procedure, unforeseen conditions may necessitate additional or different procedures than those listed above. I, therefore, further authorize and request that the above-named physician, assistants, or designees perform such procedures as are, in their judgment, necessary and desirable. 3.  My physician has discussed prior to my procedure the potential benefits, risks and side effects of this procedure; the likelihood of achieving goals; and potential problems that might occur during recuperation. They also discussed reasonable alternatives to the procedure, including risks, benefits, and side effects related to the alternatives and risks related to not receiving this procedure. I have had all my questions answered and I acknowledge that no guarantee has been made as to the result that may be obtained. 4.  Should the need arise during my procedure, which includes change of level of care prior to discharge, I also consent to the administration of blood and/or blood products. Further, I understand that despite careful testing and screening of blood or blood products by collecting agencies, I may still be subject to ill effects as a result of receiving a blood transfusion and/or blood products.  The following are some, but not all, of the potential risks that can occur: fever and allergic reactions, hemolytic reactions, transmission of diseases such as Hepatitis, AIDS and Cytomegalovirus (CMV) and fluid overload. In the event that I wish to have an autologous transfusion of my own blood, or a directed donor transfusion, I will discuss this with my physician. Check only if Refusing Blood or Blood Products  I understand refusal of blood or blood products as deemed necessary by my physician may have serious consequences to my condition to include possible death. I hereby assume responsibility for my refusal and release the hospital, its personnel, and my physicians from any responsibility for the consequences of my refusal.   [  ] Patient Refuses Blood      5. I authorize the use of any specimen, organs, tissues, body parts or foreign objects that may be removed from my body during the procedure for diagnosis, research or teaching purposes and their subsequent disposal by hospital authorities. I also authorize the release of specimen test results and/or written reports to my treating physician on the hospital medical staff or other referring or consulting physicians involved in my care, at the discretion of the Pathologist or my treating physician. 6.  I consent to the photographing or videotaping of the procedures to be performed, including appropriate portions of my body for medical, scientific, or educational purposes, provided my identity is not revealed by the pictures or by descriptive texts accompanying them. If the procedure has been photographed/videotaped, the physician will obtain the original picture, image, videotape or CD. The hospital will not be responsible for storage, release or maintenance of the picture, image, tape or CD.   7.  I consent to the presence of a  or observers in the operating room as deemed necessary by my physician or their designees. 8.  I recognize that in the event my procedure results in extended X-Ray/fluoroscopy time, I may develop a skin reaction. 9.   If I have a Do Not Attempt Resuscitation (DNAR) order in place, that status will be suspended while in the operating room, procedural suite, and during the recovery period unless otherwise explicitly stated by me (or a person authorized to consent on my behalf). The performing physician or my attending physician will determine when the applicable recovery period ends for purposes of reinstating the DNAR order. 10.  I acknowledge that my physician has explained sedation/analgesia administration to me including the risk and benefits I consent to the administration of sedation/analgesia as may be necessary or desirable in the judgment of my physician. I CERTIFY THAT I HAVE READ AND FULLY UNDERSTAND THE ABOVE CONSENT FOR THE PROCEDURE.    Signature of Patient: _____________________________________________________________  Responsible person in case of minor, unconscious: ____________________________________  Relationship to patient:  __________________________________________________________    Signature of Witness: _______________________________Date: _________Time: __________    Patient Name: Ekaterina Antunez : 10/14/1975  Printed: 2022   Medical Record #: [id number]

## (undated) NOTE — LETTER
[facility], [place]  Authorization for Invasive Procedures  1.  I hereby authorize Dr. Michelle Lawson, my physician and whomever may be designated as the doctor's assistant, to perform the following operation and/or procedure:  Colonoscopy performed for the purposes of advancing medicine, science, and/or education, provided my identity is not revealed. If the procedure has been videotaped, the physician/surgeon will obtain the original videotape.  The hospital will not be responsible for stor My signature below affirms that prior to the time of the procedure, I have explained to the patient and/or her legal representative, the risks and benefits involved in the proposed treatment and any reasonable alternative to the proposed treatment.  I have

## (undated) NOTE — LETTER
[address] [place] [address]  Authorization for Surgical Operation or Procedure  Date: ______________       Time: _______________  1.  I hereby authorize Dr. Case Arana, my physician and the assistant, to perform the following operation an 5. I consent to the photographing of the operations or procedures to be performed for the purposes of advancing medicine, science, and/or education, provided my identity is not revealed.  If the procedure has been videotaped, the physician/surgeon will obta risks and benefits involved in the proposed treatment and any reasonable alternative to the proposed treatment. I have also explained the risks and benefits involved in the refusal of the proposed treatment and have answered the patient's questions.  If I h

## (undated) NOTE — LETTER
22      Patient: Jaret Walton  : 10/14/1975 Visit date: 2022    Dear Tres Nur,      I examined your patient in consultation today. She presents with profound right thumb numbness and a tremor. I find no clinical evidence of carpal tunnel syndrome. She may have a more proximal for a systemic cause for her numbness and tremor. I have referred her to physiatry for same. Thank you for your kind referral. If I may answer any questions, please feel free to contact me.      Sincerely,   Sandy Giles MD     CC:   No Recipients

## (undated) NOTE — LETTER
1/22/2019              [address] [place], [place] [id number]         To whom it may concern,    Nahun Montero is under my medical care. She is unable to work at this time.   She will return on 1/25/19 without restricit

## (undated) NOTE — LETTER
9/21/2020          To Whom It May Concern:    Rex Gibson is currently under my medical care and may return to work on Monday 9/21/2020.     Activity is restricted as follows: As tolerated    If you require additional information please contact o

## (undated) NOTE — LETTER
2/3/2020              [address] [place] [address]         To Whom It May Concern,    Janet Delatorre (10/14/1975) is under my medical care. She is unable to work at this time. She will return on  2/6/2020.

## (undated) NOTE — LETTER
[address] [place] [address]      Authorization for Surgical Operation and Procedure     Date:___________                                                                                                         Time:_______ be subject to ill effects as a result of receiving a blood transfusion and/or blood products.   The following are some, but not all, of the potential risks that can occur: fever and allergic reactions, hemolytic reactions, transmission of diseases such as H and during the recovery period unless otherwise explicitly stated by me (or a person authorized to consent on my behalf).  The surgeon or my attending physician will determine when the applicable recovery period ends for purposes of reinstating the DNAR ord or implant, or other significant relationship used in this procedure/surgery, I have disclosed this and had a discussion with my patient.     _______________________________________________________________ _____________________________  Kem Azar Physi

## (undated) NOTE — MR AVS SNAPSHOT
[place]  [address]  [phone]               Thank you for choosing us for your health care visit with Lukas Goldman MD.  We are glad to serve you and happy to provide you with this summary of Fluticasone Propionate 0.05 % Crea   Apply 1 Application topically 2 (two) times daily.  Use as directed 2 times daily for psoriasis on face   Commonly known as:  CUTIVATE           Losartan Potassium 50 MG Tabs   Take 1 tablet (50 mg total) by mouth daily Enjoy your food, but eat less. Fully enjoy your food when eating. Don’t eat while distracted and slow down. Avoid over sized portions. Don’t eat while when you’re bored.      EAT THESE FOODS MORE OFTEN: EAT THESE FOODS LESS OFTEN:   Make half your pl

## (undated) NOTE — LETTER
7/12/2023    Errol Cordoba        [address] [facility]            Dear Errol Cordoba,      Our records indicate that you are due for an appointment for a Colonoscopy with a physician at [address] [place] [address],[address] - Gastroenterology. Our doctors are booking out about 3-5 months in advance for procedures. Please call our office to schedule a phone screening appointment to plan for the procedure(s). Your medical well-being is important to us. If your insurance requires a referral, please call your primary care office to request one.       Thank you,      [facility]

## (undated) NOTE — MR AVS SNAPSHOT
[address]  [address] [facility]  [phone]               Thank you for choosing us for your health care visit with Kiley Weber MD.  We are glad to serve you and happy to provide you with this summar Apply  topically.  apply by TOPICAL route 2 times every day a thin film to the affected skin areasand rub in gently and completely           ELETONE Crea   Use bid           Fluocinolone Acetonide 0.01 % Oil   Use qhs to ears for psoriasis   Commonly known

## (undated) NOTE — LETTER
4/1/2021              Melba Garza        [address] [id number]         To Whom It May Concern,    Melba Garza (10/14/1975) is under my medical care. Please allow her to take every Friday off as needed.   She ha

## (undated) NOTE — LETTER
3/8/2018              [address] [place] [id number]         Dear Yong Rice,    It was a pleasure to see you. Your Sleep Study was normal.  There is no need for further testing at this time.   I look forward to seeing you a no apneic events. There were no hypopneic events. The combined apnea plus hypopnea index was 0.0 events per hour, and this was the same in REM sleep and the supine position.   The patient spent 34 minutes in the supine position, 20 minutes in the prone po